# Patient Record
Sex: MALE | Race: WHITE | HISPANIC OR LATINO | Employment: UNEMPLOYED | URBAN - METROPOLITAN AREA
[De-identification: names, ages, dates, MRNs, and addresses within clinical notes are randomized per-mention and may not be internally consistent; named-entity substitution may affect disease eponyms.]

---

## 2017-09-17 ENCOUNTER — HOSPITAL ENCOUNTER (EMERGENCY)
Facility: CLINIC | Age: 32
Discharge: HOME | End: 2017-09-18
Attending: EMERGENCY MEDICINE | Admitting: EMERGENCY MEDICINE
Payer: COMMERCIAL

## 2017-09-17 DIAGNOSIS — F15.10 METHAMPHETAMINE ABUSE (H): ICD-10-CM

## 2017-09-17 DIAGNOSIS — R73.9 HYPERGLYCEMIA: ICD-10-CM

## 2017-09-17 DIAGNOSIS — F32.A DEPRESSION, UNSPECIFIED DEPRESSION TYPE: ICD-10-CM

## 2017-09-17 DIAGNOSIS — F41.9 ANXIETY: ICD-10-CM

## 2017-09-17 LAB — GLUCOSE BLDC GLUCOMTR-MCNC: 369 MG/DL (ref 70–99)

## 2017-09-17 PROCEDURE — 25000131 ZZH RX MED GY IP 250 OP 636 PS 637: Performed by: EMERGENCY MEDICINE

## 2017-09-17 PROCEDURE — 96361 HYDRATE IV INFUSION ADD-ON: CPT

## 2017-09-17 PROCEDURE — 96374 THER/PROPH/DIAG INJ IV PUSH: CPT

## 2017-09-17 PROCEDURE — 80048 BASIC METABOLIC PNL TOTAL CA: CPT | Performed by: EMERGENCY MEDICINE

## 2017-09-17 PROCEDURE — 00000146 ZZHCL STATISTIC GLUCOSE BY METER IP

## 2017-09-17 PROCEDURE — 82010 KETONE BODYS QUAN: CPT | Performed by: EMERGENCY MEDICINE

## 2017-09-17 PROCEDURE — 99285 EMERGENCY DEPT VISIT HI MDM: CPT | Mod: 25

## 2017-09-17 PROCEDURE — 96372 THER/PROPH/DIAG INJ SC/IM: CPT

## 2017-09-17 PROCEDURE — 85025 COMPLETE CBC W/AUTO DIFF WBC: CPT | Performed by: EMERGENCY MEDICINE

## 2017-09-17 PROCEDURE — 25000128 H RX IP 250 OP 636: Performed by: EMERGENCY MEDICINE

## 2017-09-17 PROCEDURE — 90791 PSYCH DIAGNOSTIC EVALUATION: CPT

## 2017-09-17 RX ORDER — DEXTROSE MONOHYDRATE 25 G/50ML
25-50 INJECTION, SOLUTION INTRAVENOUS
Status: DISCONTINUED | OUTPATIENT
Start: 2017-09-17 | End: 2017-09-17

## 2017-09-17 RX ORDER — LORAZEPAM 2 MG/ML
1 INJECTION INTRAMUSCULAR ONCE
Status: COMPLETED | OUTPATIENT
Start: 2017-09-17 | End: 2017-09-17

## 2017-09-17 RX ADMIN — INSULIN HUMAN 15 UNITS: 100 INJECTION, SOLUTION PARENTERAL at 23:57

## 2017-09-17 RX ADMIN — SODIUM CHLORIDE 1000 ML: 9 INJECTION, SOLUTION INTRAVENOUS at 23:55

## 2017-09-17 RX ADMIN — LORAZEPAM 1 MG: 2 INJECTION INTRAMUSCULAR; INTRAVENOUS at 23:56

## 2017-09-17 ASSESSMENT — ENCOUNTER SYMPTOMS: HALLUCINATIONS: 0

## 2017-09-17 NOTE — ED AVS SNAPSHOT
[facility]    [address]    [facility] [address]    Phone:  [phone]    Fax:  [phone]                                       Olman Newby   MRN: [id number]    Department:  [facility]   Date of Visit:  9/17/2017           After Visit Summary Signature Page     I have received my discharge instructions, and my questions have been answered. I have discussed any challenges I see with this plan with the nurse or doctor.    ..........................................................................................................................................  Patient/Patient Representative Signature      ..........................................................................................................................................  Patient Representative Print Name and Relationship to Patient    ..................................................               ................................................  Date                                            Time    ..........................................................................................................................................  Reviewed by Signature/Title    ...................................................              ..............................................  Date                                                            Time

## 2017-09-17 NOTE — ED AVS SNAPSHOT
[facility] Emergency Department    [address] [place] [address]    Phone:  [phone]    Fax:  [phone]                                       Olman Newby   MRN: [id number]    Department:  [facility] Emergency Department   Date of Visit:  9/17/2017           Patient Information     Date Of Birth          1985        Your diagnoses for this visit were:     Depression, unspecified depression type     Anxiety     Methamphetamine abuse     Hyperglycemia        You were seen by Manjinder Maldonado MD.      Follow-up Information     Follow up with Óscar Verdugo MD. Call in 3 days.    Specialty:  INTERNAL MEDICINE - ENDOCRINOLOGY, DIABETES & METABOLISM    Contact information:    [facility] CL[facility]  [address]  [facility] [address]  [phone]          Follow up with mental health and detox. Call in 1 day.        Discharge Instructions       Discharge Instructions  Hyperglycemia, High Blood Sugar    Today we found your blood sugar (glucose) was high. This may mean that you have developed diabetes, or if you already know that you have diabetes, it may mean that your diabetes is not as well controlled as it should be. Signs of elevated blood sugar include increased thirst, frequent urination, blurred vision, fatigue, unexplained weight loss, poor wound healing, and frequent infections.     We sometimes give medicine in the Emergency Department to lower the blood sugar. We may also prescribe medicine for you to use at home, or increase the medicine that you already take. While we don t like to see your blood sugar high, it is much more dangerous to let your blood sugar get too low, so it is reasonable to take time to bring it down, or to wait and watch to see if it comes down on its own.     It is very important that you go to see your regular doctor to have additional tests to see what the high blood sugar test means in your case. See your  doctor as directed today, or within 1 week.    Return to the Emergency Department if you develop:    Nausea and vomiting.    Confusion, disorientation, or being unable to wake up.    Fever greater than 101.5.    Blood sugar greater than 400.    Abdominal pain.    What can I do to help myself?    Check your blood sugar as instructed by your doctor.    Take medications prescribed by your doctor.    Follow a diabetic diet (low fat, low concentrated sweets, high fiber).    Exercise regularly.    Moderate or eliminate alcohol use.    Stop smoking.    Diabetes mellitus is a disease in which the body cannot regulate the amount of sugar (glucose) in the blood. Insulin allows glucose to move out of the blood into cells throughout the body where it is used for fuel. People with diabetes do not produce enough insulin (type 1 diabetes), or cannot use insulin properly (type 2 diabetes), or both. This starves the cells that need the glucose for fuel, and also harms certain organs and tissues exposed to the high glucose levels.  Over a long period of time, uncontrolled diabetes can lead to heart and blood vessel disease, blindness, kidney failure, foot ulcers and many other problems.    About 17 million Americans (6.2% of adults) have diabetes. We think that about one third of adults with diabetes do not know they have diabetes.  The incidence of diabetes is increasing rapidly. This increase is due to many factors, but the most significant are our increasing weight and decreased activity levels.     Diabetes can be a very serious and life-threatening illness if not treated.  If you were given a prescription for medicine here today, be sure to read all of the information (including the package insert) that comes with your prescription.  This will include important information about the medicine, its side effects, and any warnings that you need to know about.  The pharmacist who fills the prescription can provide more information  and answer questions you may have about the medicine.  If you have questions or concerns that the pharmacist cannot address, please call or return to the Emergency Department.   Opioid Medication Information    Pain medications are among the most commonly prescribed medicines, so we are including this information for all our patients. If you did not receive pain medication or get a prescription for pain medicine, you can ignore it.     You may have been given a prescription for an opioid (narcotic) pain medicine and/or have received a pain medicine while here in the Emergency Department. These medicines can make you drowsy or impaired. You must not drive, operate dangerous equipment, or engage in any other dangerous activities while taking these medications. If you drive while taking these medications, you could be arrested for DUI, or driving under the influence. Do not drink any alcohol while you are taking these medications.     Opioid pain medications can cause addiction. If you have a history of chemical dependency of any type, you are at a higher risk of becoming addicted to pain medications.  Only take these prescribed medications to treat your pain when all other options have been tried. Take it for as short a time and as few doses as possible. Store your pain pills in a secure place, as they are frequently stolen and provide a dangerous opportunity for children or visitors in your house to start abusing these powerful medications. We will not replace any lost or stolen medicine.  As soon as your pain is better, you should flush all your remaining medication.     Many prescription pain medications contain Tylenol  (acetaminophen), including Vicodin , Tylenol #3 , Norco , Lortab , and Percocet .  You should not take any extra pills of Tylenol  if you are using these prescription medications or you can get very sick.  Do not ever take more than 3000 mg of acetaminophen in any 24 hour period.    All opioids  tend to cause constipation. Drink plenty of water and eat foods that have a lot of fiber, such as fruits, vegetables, prune juice, apple juice and high fiber cereal.  Take a laxative if you don t move your bowels at least every other day. Miralax , Milk of Magnesia, Colace , or Senna  can be used to keep you regular.      Remember that you can always come back to the Emergency Department if you are not able to see your regular doctor in the amount of time listed above, if you get any new symptoms, or if there is anything that worries you.        Discharge Instructions  Mental Health Concerns    You were seen today for mental health concerns, such as depression, severe anxiety, or suicidal thinking. Your doctor feels that you do not require hospitalization at this time. However, your symptoms may become worse, and you may need to return to the Emergency Department. Most treatments of depression and suicidal thoughts are a process rather than a single intervention.  Medications and counseling can take several weeks or more to help.  It is important to follow up as discussed with your family doctor or counselor.      By accepting these discharge instructions:    You promise to not harm yourself or others.    You agree that if you feel you are becoming unable to keep that promise, you will do something to help yourself before you do anything to harm yourself or others.     You agree to keep any safety plan arranged on your visit here today.    You agree to take any medication prescribed or recommended by your doctor.    If you are getting worse, you can contact a friend or a family member, contact your counselor or family doctor, contact a crisis line, or other options discussed with the doctor or therapist today.    At any time, you can call 911 and return to the emergency department for more help.    You understand that follow-up is essential to your treatment, and you will make and keep appointments recommended on  your visit today.    How to improve your mental health and prevent suicide:    Involve others by letting family, friends, counselors know.  Do not isolate yourself.    Avoid alcohol or drugs. Remove weapons, poisons from your home.    Try to stick to routines for eating, sleeping and getting regular exercise.      Try to get into sunlight. Bright natural light not only treats seasonal affective disorder but also depression.    Increase safe activities that you enjoy.    If you feel worse, contact [phone], or call 911, or your family doctor/counselor for additional assistance.    If you were given a prescription for medicine here today, be sure to read all of the information (including the package insert) that comes with your prescription.  This will include important information about the medicine, its side effects, and any warnings that you need to know about.  The pharmacist who fills the prescription can provide more information and answer questions you may have about the medicine.  If you have questions or concerns that the pharmacist cannot address, please call or return to the Emergency Department.     Opioid Medication Information    Pain medications are among the most commonly prescribed medicines, so we are including this information for all our patients. If you did not receive pain medication or get a prescription for pain medicine, you can ignore it.     You may have been given a prescription for an opioid (narcotic) pain medicine and/or have received a pain medicine while here in the Emergency Department. These medicines can make you drowsy or impaired. You must not drive, operate dangerous equipment, or engage in any other dangerous activities while taking these medications. If you drive while taking these medications, you could be arrested for DUI, or driving under the influence. Do not drink any alcohol while you are taking these medications.     Opioid pain medications can cause addiction. If  you have a history of chemical dependency of any type, you are at a higher risk of becoming addicted to pain medications.  Only take these prescribed medications to treat your pain when all other options have been tried. Take it for as short a time and as few doses as possible. Store your pain pills in a secure place, as they are frequently stolen and provide a dangerous opportunity for children or visitors in your house to start abusing these powerful medications. We will not replace any lost or stolen medicine.  As soon as your pain is better, you should flush all your remaining medication.     Many prescription pain medications contain Tylenol  (acetaminophen), including Vicodin , Tylenol #3 , Norco , Lortab , and Percocet .  You should not take any extra pills of Tylenol  if you are using these prescription medications or you can get very sick.  Do not ever take more than 3000 mg of acetaminophen in any 24 hour period.    All opioids tend to cause constipation. Drink plenty of water and eat foods that have a lot of fiber, such as fruits, vegetables, prune juice, apple juice and high fiber cereal.  Take a laxative if you don t move your bowels at least every other day. Miralax , Milk of Magnesia, Colace , or Senna  can be used to keep you regular.      Remember that you can always come back to the Emergency Department if you are not able to see your regular doctor in the amount of time listed above, if you get any new symptoms, or if there is anything that worries you.        Drug Abuse  Use and abuse of drugs like marijuana, amphetamines (speed, crank), cocaine, heroin or prescription pain medicines, sedatives and sleeping pills, MDMA, ecstasy, bath salts, PCP, mescaline and LSD may lead to addiction or dependence. Once this happens, you are at greater risk for any of the following:  Social and personal problems    Craving for the drug and not able to stop using even though you think you want to stop  (psychological addiction)    Drug withdrawal symptoms if you stop taking the drug (physical dependence)    Loss of job or your family    Arrest, conviction, and prison sentence for possession of an illegal substance or for driving under the influence  Health problems    Strokes, heart attacks, kidney failure    Accidental injuries to yourself or others while you are under the influence of the drug (in a car or at home)    HIV infection (much greater risk if you use IV drugs)    Skin infections    Other sexually transmitted disease (STDs such as herpes, chlamydia, gonorrhea, and others)    Severe and fatal infection of the heart valves (if you use IV drugs)    Hepatitis B or C    Death from overdose  Home care  The following suggestions can help you care for yourself at home:    Admit you have a drug problem. Ask for help from your family and close friends.    Seek professional help. This could be in the form of individual psychotherapy or counseling. There are also outpatient, inpatient, and residential drug treatment programs.    Join a self-help group for drug abuse.    Stay away from friends who abuse drugs or tempt you to continue abusing drugs.    Eat a balanced diet and start a regular exercise program.  Follow-up care  Follow up with your healthcare provider, or as advised. Contact one of the resources below for help:    National [place] on Alcoholism and Drug Dependence  www.ncadd.org  561.409.2331    Narcotics Anonymous  www.na.org  634.587.1777    National Alcohol and Substance Abuse Information Center (for referral to treatment programs)  www.addictioncareNew Screens.The [employer] Times  254.375.3044  Call 911  Call 911 if any of the following occur:    Seizure    Hard time breathing or slow, irregular breathing    Chest pain    Sudden weakness on one side of your body or sudden trouble speaking    Very drowsy or trouble awakening    Fainting or loss of consciousness    Rapid heart rate    Very slow heart rate  When to seek  medical advice  Call your healthcare provider right away if any of these occur:    Agitation, anxiety, unable to sleep    Unintended weight loss (more than 10 to 15 pounds over 3 months)    Fever of 100.4 F (38 C) or higher, or as directed by your healthcare provider    Shortness of breath    Cough with colored sputum    Redness, swelling, or tenderness at an injection site  Date Last Reviewed: 6/1/2016 2000-2017 [employer]. [address] [place] [address], [place] [address]. All rights reserved. This information is not intended as a substitute for professional medical care. Always follow your healthcare professional's instructions.          24 Hour Appointment Hotline       To make an appointment at any [facility], call [phone] ([phone]). If you don't have a family doctor or clinic, we will help you find one. [facility] are conveniently located to serve the needs of you and your family.             Review of your medicines      Our records show that you are taking the medicines listed below. If these are incorrect, please call your family doctor or clinic.        Dose / Directions Last dose taken    aspirin 81 MG tablet   Dose:  1 tablet        Take 1 tablet by mouth daily.   Refills:  0        LANTUS SC        Inject  Subcutaneous daily.   Refills:  0        NOVOLOG SC        Inject  Subcutaneous daily.   Refills:  0                Procedures and tests performed during your visit     Procedure/Test Number of Times Performed    Basic metabolic panel 1    CBC with platelets differential 1    Glucose by meter 2    Ketone Beta-Hydroxybutyrate Quantitative 1      Orders Needing Specimen Collection     None      Pending Results     No orders found for last 3 day(s).            Pending Culture Results     No orders found for last 3 day(s).            Pending Results Instructions     If you had any lab results that were not finalized at the time of your Discharge, you can call the ED Lab  Result RN at [phone]. You will be contacted by this team for any positive Lab results or changes in treatment. The nurses are available 7 days a week from 10A to 6:30P.  You can leave a message 24 hours per day and they will return your call.        Test Results From Your Hospital Stay        9/17/2017 11:25 PM      Component Results     Component Value Ref Range & Units Status    Glucose 369 (H) 70 - 99 mg/dL Final         9/18/2017 12:00 AM      Component Results     Component Value Ref Range & Units Status    WBC 9.6 4.0 - 11.0 10e9/L Final    RBC Count 5.29 4.4 - 5.9 10e12/L Final    Hemoglobin 16.1 13.3 - 17.7 g/dL Final    Hematocrit 47.0 40.0 - 53.0 % Final    MCV 89 78 - 100 fl Final    MCH 30.4 26.5 - 33.0 pg Final    MCHC 34.3 31.5 - 36.5 g/dL Final    RDW 12.3 10.0 - 15.0 % Final    Platelet Count 301 150 - 450 10e9/L Final    Diff Method Automated Method  Final    % Neutrophils 79.5 % Final    % Lymphocytes 13.1 % Final    % Monocytes 6.1 % Final    % Eosinophils 0.4 % Final    % Basophils 0.5 % Final    % Immature Granulocytes 0.4 % Final    Nucleated RBCs 0 0 /100 Final    Absolute Neutrophil 7.6 1.6 - 8.3 10e9/L Final    Absolute Lymphocytes 1.3 0.8 - 5.3 10e9/L Final    Absolute Monocytes 0.6 0.0 - 1.3 10e9/L Final    Absolute Eosinophils 0.0 0.0 - 0.7 10e9/L Final    Absolute Basophils 0.1 0.0 - 0.2 10e9/L Final    Abs Immature Granulocytes 0.0 0 - 0.4 10e9/L Final    Absolute Nucleated RBC 0.0  Final         9/18/2017 12:14 AM      Component Results     Component Value Ref Range & Units Status    Sodium 130 (L) 133 - 144 mmol/L Final    Potassium 4.8 3.4 - 5.3 mmol/L Final    Chloride 98 94 - 109 mmol/L Final    Carbon Dioxide 26 20 - 32 mmol/L Final    Anion Gap 6 3 - 14 mmol/L Final    Glucose 389 (H) 70 - 99 mg/dL Final    Urea Nitrogen 12 7 - 30 mg/dL Final    Creatinine 0.77 0.66 - 1.25 mg/dL Final    GFR Estimate >90 >60 mL/min/1.7m2 Final    Non  GFR Calc    GFR Estimate  If Black >90 >60 mL/min/1.7m2 Final    African American GFR Calc    Calcium 9.0 8.5 - 10.1 mg/dL Final         9/18/2017 12:07 AM      Component Results     Component Value Ref Range & Units Status    Ketone Quantitative 1.3 (H) 0.0 - 0.6 mmol/L Final         9/18/2017  1:25 AM      Component Results     Component Value Ref Range & Units Status    Glucose 286 (H) 70 - 99 mg/dL Final                Clinical Quality Measure: Blood Pressure Screening     Your blood pressure was checked while you were in the emergency department today. The last reading we obtained was  BP: (!) 149/96 . Please read the guidelines below about what these numbers mean and what you should do about them.  If your systolic blood pressure (the top number) is less than 120 and your diastolic blood pressure (the bottom number) is less than 80, then your blood pressure is normal. There is nothing more that you need to do about it.  If your systolic blood pressure (the top number) is 120-139 or your diastolic blood pressure (the bottom number) is 80-89, your blood pressure may be higher than it should be. You should have your blood pressure rechecked within a year by a primary care provider.  If your systolic blood pressure (the top number) is 140 or greater or your diastolic blood pressure (the bottom number) is 90 or greater, you may have high blood pressure. High blood pressure is treatable, but if left untreated over time it can put you at risk for heart attack, stroke, or kidney failure. You should have your blood pressure rechecked by a primary care provider within the next 4 weeks.  If your provider in the emergency department today gave you specific instructions to follow-up with your doctor or provider even sooner than that, you should follow that instruction and not wait for up to 4 weeks for your follow-up visit.        Thank you for choosing [facility]       Thank you for choosing [facility] for your care. Our goal is always to provide you  "with excellent care. Hearing back from our patients is one way we can continue to improve our services. Please take a few minutes to complete the written survey that you may receive in the mail after you visit with us. Thank you!        [facility] Information     [facility] lets you send messages to your doctor, view your test results, renew your prescriptions, schedule appointments and more. To sign up, go to [email or web address] . Click on \"Log in\" on the left side of the screen, which will take you to the Welcome page. Then click on \"Sign up Now\" on the right side of the page.     You will be asked to enter the access code listed below, as well as some personal information. Please follow the directions to create your username and password.     Your access code is: [email or web address]  Expires: 2017  1:31 AM     Your access code will  in 90 days. If you need help or a new code, please call your [facility] clinic or [phone].        Care EveryWhere ID     This is your Care EveryWhere ID. This could be used by other organizations to access your [facility] medical records  [id number]        Equal Access to Services     MARLY SCANLON AH: Hadii debi Macdonald, wapattie richardson, qaybta kaalmaiveth gold, boogie horton. So [facility] [phone].    ATENCIÓN: Si habla español, tiene a vora disposición servicios gratuitos de asistencia lingüística. Mehrdadame al [phone].    We comply with applicable federal civil rights laws and [place] laws. We do not discriminate on the basis of race, color, national origin, age, disability sex, sexual orientation or gender identity.            After Visit Summary       This is your record. Keep this with you and show to your community pharmacist(s) and doctor(s) at your next visit.                  "

## 2017-09-18 VITALS
OXYGEN SATURATION: 98 % | DIASTOLIC BLOOD PRESSURE: 96 MMHG | RESPIRATION RATE: 16 BRPM | SYSTOLIC BLOOD PRESSURE: 149 MMHG | TEMPERATURE: 97.8 F

## 2017-09-18 LAB
ANION GAP SERPL CALCULATED.3IONS-SCNC: 6 MMOL/L (ref 3–14)
BASOPHILS # BLD AUTO: 0.1 10E9/L (ref 0–0.2)
BASOPHILS NFR BLD AUTO: 0.5 %
BUN SERPL-MCNC: 12 MG/DL (ref 7–30)
CALCIUM SERPL-MCNC: 9 MG/DL (ref 8.5–10.1)
CHLORIDE SERPL-SCNC: 98 MMOL/L (ref 94–109)
CO2 SERPL-SCNC: 26 MMOL/L (ref 20–32)
CREAT SERPL-MCNC: 0.77 MG/DL (ref 0.66–1.25)
DIFFERENTIAL METHOD BLD: NORMAL
EOSINOPHIL # BLD AUTO: 0 10E9/L (ref 0–0.7)
EOSINOPHIL NFR BLD AUTO: 0.4 %
ERYTHROCYTE [DISTWIDTH] IN BLOOD BY AUTOMATED COUNT: 12.3 % (ref 10–15)
GFR SERPL CREATININE-BSD FRML MDRD: >90 ML/MIN/1.7M2
GLUCOSE BLDC GLUCOMTR-MCNC: 286 MG/DL (ref 70–99)
GLUCOSE SERPL-MCNC: 389 MG/DL (ref 70–99)
HCT VFR BLD AUTO: 47 % (ref 40–53)
HGB BLD-MCNC: 16.1 G/DL (ref 13.3–17.7)
IMM GRANULOCYTES # BLD: 0 10E9/L (ref 0–0.4)
IMM GRANULOCYTES NFR BLD: 0.4 %
KETONES BLD-SCNC: 1.3 MMOL/L (ref 0–0.6)
LYMPHOCYTES # BLD AUTO: 1.3 10E9/L (ref 0.8–5.3)
LYMPHOCYTES NFR BLD AUTO: 13.1 %
MCH RBC QN AUTO: 30.4 PG (ref 26.5–33)
MCHC RBC AUTO-ENTMCNC: 34.3 G/DL (ref 31.5–36.5)
MCV RBC AUTO: 89 FL (ref 78–100)
MONOCYTES # BLD AUTO: 0.6 10E9/L (ref 0–1.3)
MONOCYTES NFR BLD AUTO: 6.1 %
NEUTROPHILS # BLD AUTO: 7.6 10E9/L (ref 1.6–8.3)
NEUTROPHILS NFR BLD AUTO: 79.5 %
NRBC # BLD AUTO: 0 10*3/UL
NRBC BLD AUTO-RTO: 0 /100
PLATELET # BLD AUTO: 301 10E9/L (ref 150–450)
POTASSIUM SERPL-SCNC: 4.8 MMOL/L (ref 3.4–5.3)
RBC # BLD AUTO: 5.29 10E12/L (ref 4.4–5.9)
SODIUM SERPL-SCNC: 130 MMOL/L (ref 133–144)
WBC # BLD AUTO: 9.6 10E9/L (ref 4–11)

## 2017-09-18 PROCEDURE — 96361 HYDRATE IV INFUSION ADD-ON: CPT

## 2017-09-18 PROCEDURE — 00000146 ZZHCL STATISTIC GLUCOSE BY METER IP

## 2017-09-18 NOTE — ED NOTES
Encouraged patient to follow up with appointments as made, encouraged patient to discontinue drug use and seek help, patient discharged to friend

## 2017-09-18 NOTE — ED NOTES
Repeat blood sugar done, patient called for a ride and they will be here in 15 minutes, MD informed of all

## 2017-09-18 NOTE — ED PROVIDER NOTES
History     Chief Complaint:  Mental health evaluation    HPI   Olman Newby is a 31 year old male who presents for a psychiatric evaluation. The patient reports he is dealing with depression, anxiety and increased stress. He is concerned by his addiction to methamphetamine and alcohol, and wants to get his life together. He states he ate meth today. He does not want to harm himself. Denies hallucinations.     Of note, the patient has diabetes, and his blood sugar was 369 earlier today. He states he forgot to take his insulin today. Patient has been in DKA before.    Allergies:  Sulfa drugs     Medications:    Novolog  Lantus  Aspirin     Past Medical History:    DM    Past Surgical History:    History reviewed.  No significant past surgical history.    Family History:    History reviewed.  No significant family history.    Social History:  Relationship status: Single  Tobacco use: Positive  Alcohol use: Positive  The patient presents alone.     Review of Systems   Psychiatric/Behavioral: Negative for hallucinations and self-injury.   All other systems reviewed and are negative.      Physical Exam   First Vitals:  BP: (!) 155/97  Heart Rate: 110  Temp: 97.8  F (36.6  C)  Resp: 16  SpO2: 98 %    Physical Exam  Constitutional:  Oriented to person, place, and time.  HENT:   Head:    Normocephalic.   Mouth/Throat:   Oropharynx is clear and moist.   Eyes:    EOM are normal. Pupils are equal, round, and reactive to light.   Neck:    Neck supple.   Cardiovascular:  Normal rate, regular rhythm and normal heart sounds.      Exam reveals no gallop and no friction rub.       No murmur heard.  Pulmonary/Chest:  Effort normal and breath sounds normal.      No respiratory distress. No wheezes. No rales.      No reproducible chest wall pain.  Abdominal:   Soft. No distension. No tenderness. No rebound and no guarding.   Musculoskeletal:  Normal range of motion.   Neurological:   Alert and oriented to person, place, and  time.           Moves all 4 extremities spontaneously    Skin:    No rash noted. No pallor.   Psych:   Anxious, depressed, no suicidal ideation.       Emergency Department Course     Laboratory:  CBC: WNL (WBC 9.6, HGB 16.1, )  BMP: Na 130 (L), Glucose 389 (H), o/w WNL (Creatinine 0.77)  2319: Glucose by Meter: 369 (H)   0121: Glucose by Meter: 286 (H)   Ketone beta-hydroxybutyrate quantitative: 1.3 (H)    Interventions:  2355: Normal Saline, 1000 mL, IV  2356: Ativan, 1.0 mg, IV injection  2357: Insulin, 15 units, Subcutaneous    Emergency Department Course:  Nursing notes and vitals reviewed.  I performed an exam of the patient as documented above.  The above workup was undertaken.   I rechecked the patient and discussed results.    Findings and plan explained to the Patient. Patient discharged home, status improved, with instructions regarding supportive care, medications, and reasons to return as well as the importance of close follow-up was reviewed.      Impression & Plan      Medical Decision Making:  Olman Newby is a 31 year old male who wanted help for mental health, as well as drug abuse. Denied suicidal ideation. He had some methamphetamine use earlier in the day, as well as forgetting to take his insulin. His glucose is high, and despite having mildly elevated ketones, he has no anion gap. This is not consisted with DKA. He has been given a corrected dose of his insulin, and glucose has come down nicely. DEC did further consult, and recommended outpatient follow up, as he is non suicidal and clinically sober. I believe he is appropriate for outpatient management. He is told to watch his sugar carefully. Follow up with mental health and detox resources. Return for any suicidal ideation, hyperglycemia. Discharged home to follow up with PMD and mental health resources..     Diagnosis:    ICD-10-CM    1. Depression, unspecified depression type F32.9 Glucose by meter     Glucose by meter   2.  Anxiety F41.9    3. Methamphetamine abuse F15.10    4. Hyperglycemia R73.9      Disposition:  Discharge to home with primary care follow up.    I, Primitivo Collins, am serving as a scribe on 9/17/2017 at 11:20 PM to personally document services performed by Manjinder Maldonado MD, based on my observations and the provider's statements to me.    [facility] EMERGENCY DEPARTMENT       Manjinder Maldonado MD  09/18/17 0212

## 2017-09-18 NOTE — ED NOTES
Patient reports struggling with depression and anxiety for around 5 years, became much worse yesterday and didn't sleep last night, works in morning and concerned about sleeping tonight, admits to having 3 beers today, some marijuana and some narcotics as well today, restless in room, cooperative at this time,

## 2017-09-18 NOTE — DISCHARGE INSTRUCTIONS
Discharge Instructions  Hyperglycemia, High Blood Sugar    Today we found your blood sugar (glucose) was high. This may mean that you have developed diabetes, or if you already know that you have diabetes, it may mean that your diabetes is not as well controlled as it should be. Signs of elevated blood sugar include increased thirst, frequent urination, blurred vision, fatigue, unexplained weight loss, poor wound healing, and frequent infections.     We sometimes give medicine in the Emergency Department to lower the blood sugar. We may also prescribe medicine for you to use at home, or increase the medicine that you already take. While we don t like to see your blood sugar high, it is much more dangerous to let your blood sugar get too low, so it is reasonable to take time to bring it down, or to wait and watch to see if it comes down on its own.     It is very important that you go to see your regular doctor to have additional tests to see what the high blood sugar test means in your case. See your doctor as directed today, or within 1 week.    Return to the Emergency Department if you develop:    Nausea and vomiting.    Confusion, disorientation, or being unable to wake up.    Fever greater than 101.5.    Blood sugar greater than 400.    Abdominal pain.    What can I do to help myself?    Check your blood sugar as instructed by your doctor.    Take medications prescribed by your doctor.    Follow a diabetic diet (low fat, low concentrated sweets, high fiber).    Exercise regularly.    Moderate or eliminate alcohol use.    Stop smoking.    Diabetes mellitus is a disease in which the body cannot regulate the amount of sugar (glucose) in the blood. Insulin allows glucose to move out of the blood into cells throughout the body where it is used for fuel. People with diabetes do not produce enough insulin (type 1 diabetes), or cannot use insulin properly (type 2 diabetes), or both. This starves the cells that need the  glucose for fuel, and also harms certain organs and tissues exposed to the high glucose levels.  Over a long period of time, uncontrolled diabetes can lead to heart and blood vessel disease, blindness, kidney failure, foot ulcers and many other problems.    About 17 million Americans (6.2% of adults) have diabetes. We think that about one third of adults with diabetes do not know they have diabetes.  The incidence of diabetes is increasing rapidly. This increase is due to many factors, but the most significant are our increasing weight and decreased activity levels.     Diabetes can be a very serious and life-threatening illness if not treated.  If you were given a prescription for medicine here today, be sure to read all of the information (including the package insert) that comes with your prescription.  This will include important information about the medicine, its side effects, and any warnings that you need to know about.  The pharmacist who fills the prescription can provide more information and answer questions you may have about the medicine.  If you have questions or concerns that the pharmacist cannot address, please call or return to the Emergency Department.   Opioid Medication Information    Pain medications are among the most commonly prescribed medicines, so we are including this information for all our patients. If you did not receive pain medication or get a prescription for pain medicine, you can ignore it.     You may have been given a prescription for an opioid (narcotic) pain medicine and/or have received a pain medicine while here in the Emergency Department. These medicines can make you drowsy or impaired. You must not drive, operate dangerous equipment, or engage in any other dangerous activities while taking these medications. If you drive while taking these medications, you could be arrested for DUI, or driving under the influence. Do not drink any alcohol while you are taking these  medications.     Opioid pain medications can cause addiction. If you have a history of chemical dependency of any type, you are at a higher risk of becoming addicted to pain medications.  Only take these prescribed medications to treat your pain when all other options have been tried. Take it for as short a time and as few doses as possible. Store your pain pills in a secure place, as they are frequently stolen and provide a dangerous opportunity for children or visitors in your house to start abusing these powerful medications. We will not replace any lost or stolen medicine.  As soon as your pain is better, you should flush all your remaining medication.     Many prescription pain medications contain Tylenol  (acetaminophen), including Vicodin , Tylenol #3 , Norco , Lortab , and Percocet .  You should not take any extra pills of Tylenol  if you are using these prescription medications or you can get very sick.  Do not ever take more than 3000 mg of acetaminophen in any 24 hour period.    All opioids tend to cause constipation. Drink plenty of water and eat foods that have a lot of fiber, such as fruits, vegetables, prune juice, apple juice and high fiber cereal.  Take a laxative if you don t move your bowels at least every other day. Miralax , Milk of Magnesia, Colace , or Senna  can be used to keep you regular.      Remember that you can always come back to the Emergency Department if you are not able to see your regular doctor in the amount of time listed above, if you get any new symptoms, or if there is anything that worries you.        Discharge Instructions  Mental Health Concerns    You were seen today for mental health concerns, such as depression, severe anxiety, or suicidal thinking. Your doctor feels that you do not require hospitalization at this time. However, your symptoms may become worse, and you may need to return to the Emergency Department. Most treatments of depression and suicidal thoughts are  a process rather than a single intervention.  Medications and counseling can take several weeks or more to help.  It is important to follow up as discussed with your family doctor or counselor.      By accepting these discharge instructions:    You promise to not harm yourself or others.    You agree that if you feel you are becoming unable to keep that promise, you will do something to help yourself before you do anything to harm yourself or others.     You agree to keep any safety plan arranged on your visit here today.    You agree to take any medication prescribed or recommended by your doctor.    If you are getting worse, you can contact a friend or a family member, contact your counselor or family doctor, contact a crisis line, or other options discussed with the doctor or therapist today.    At any time, you can call 911 and return to the emergency department for more help.    You understand that follow-up is essential to your treatment, and you will make and keep appointments recommended on your visit today.    How to improve your mental health and prevent suicide:    Involve others by letting family, friends, counselors know.  Do not isolate yourself.    Avoid alcohol or drugs. Remove weapons, poisons from your home.    Try to stick to routines for eating, sleeping and getting regular exercise.      Try to get into sunlight. Bright natural light not only treats seasonal affective disorder but also depression.    Increase safe activities that you enjoy.    If you feel worse, contact [phone], or call 911, or your family doctor/counselor for additional assistance.    If you were given a prescription for medicine here today, be sure to read all of the information (including the package insert) that comes with your prescription.  This will include important information about the medicine, its side effects, and any warnings that you need to know about.  The pharmacist who fills the prescription can provide  more information and answer questions you may have about the medicine.  If you have questions or concerns that the pharmacist cannot address, please call or return to the Emergency Department.     Opioid Medication Information    Pain medications are among the most commonly prescribed medicines, so we are including this information for all our patients. If you did not receive pain medication or get a prescription for pain medicine, you can ignore it.     You may have been given a prescription for an opioid (narcotic) pain medicine and/or have received a pain medicine while here in the Emergency Department. These medicines can make you drowsy or impaired. You must not drive, operate dangerous equipment, or engage in any other dangerous activities while taking these medications. If you drive while taking these medications, you could be arrested for DUI, or driving under the influence. Do not drink any alcohol while you are taking these medications.     Opioid pain medications can cause addiction. If you have a history of chemical dependency of any type, you are at a higher risk of becoming addicted to pain medications.  Only take these prescribed medications to treat your pain when all other options have been tried. Take it for as short a time and as few doses as possible. Store your pain pills in a secure place, as they are frequently stolen and provide a dangerous opportunity for children or visitors in your house to start abusing these powerful medications. We will not replace any lost or stolen medicine.  As soon as your pain is better, you should flush all your remaining medication.     Many prescription pain medications contain Tylenol  (acetaminophen), including Vicodin , Tylenol #3 , Norco , Lortab , and Percocet .  You should not take any extra pills of Tylenol  if you are using these prescription medications or you can get very sick.  Do not ever take more than 3000 mg of acetaminophen in any 24 hour  period.    All opioids tend to cause constipation. Drink plenty of water and eat foods that have a lot of fiber, such as fruits, vegetables, prune juice, apple juice and high fiber cereal.  Take a laxative if you don t move your bowels at least every other day. Miralax , Milk of Magnesia, Colace , or Senna  can be used to keep you regular.      Remember that you can always come back to the Emergency Department if you are not able to see your regular doctor in the amount of time listed above, if you get any new symptoms, or if there is anything that worries you.        Drug Abuse  Use and abuse of drugs like marijuana, amphetamines (speed, crank), cocaine, heroin or prescription pain medicines, sedatives and sleeping pills, MDMA, ecstasy, bath salts, PCP, mescaline and LSD may lead to addiction or dependence. Once this happens, you are at greater risk for any of the following:  Social and personal problems    Craving for the drug and not able to stop using even though you think you want to stop (psychological addiction)    Drug withdrawal symptoms if you stop taking the drug (physical dependence)    Loss of job or your family    Arrest, conviction, and [facility] sentence for possession of an illegal substance or for driving under the influence  Health problems    Strokes, heart attacks, kidney failure    Accidental injuries to yourself or others while you are under the influence of the drug (in a car or at home)    HIV infection (much greater risk if you use IV drugs)    Skin infections    Other sexually transmitted disease (STDs such as herpes, chlamydia, gonorrhea, and others)    Severe and fatal infection of the heart valves (if you use IV drugs)    Hepatitis B or C    Death from overdose  Home care  The following suggestions can help you care for yourself at home:    Admit you have a drug problem. Ask for help from your family and close friends.    Seek professional help. This could be in the form of individual  psychotherapy or counseling. There are also outpatient, inpatient, and residential drug treatment programs.    Join a self-help group for drug abuse.    Stay away from friends who abuse drugs or tempt you to continue abusing drugs.    Eat a balanced diet and start a regular exercise program.  Follow-up care  Follow up with your healthcare provider, or as advised. Contact one of the resources below for help:    National Bern on Alcoholism and Drug Dependence  www.ncadd.org  127.461.4952    Narcotics Anonymous  www.na.org  516.325.8529    National Alcohol and Substance Abuse Information Center (for referral to treatment programs)  www[facility]  595.700.5695  Call 911  Call 911 if any of the following occur:    Seizure    Hard time breathing or slow, irregular breathing    Chest pain    Sudden weakness on one side of your body or sudden trouble speaking    Very drowsy or trouble awakening    Fainting or loss of consciousness    Rapid heart rate    Very slow heart rate  When to seek medical advice  Call your healthcare provider right away if any of these occur:    Agitation, anxiety, unable to sleep    Unintended weight loss (more than 10 to 15 pounds over 3 months)    Fever of 100.4 F (38 C) or higher, or as directed by your healthcare provider    Shortness of breath    Cough with colored sputum    Redness, swelling, or tenderness at an injection site  Date Last Reviewed: 6/1/2016 2000-2017 [employer]. [address] [place] [address], [place] [address]. All rights reserved. This information is not intended as a substitute for professional medical care. Always follow your healthcare professional's instructions.

## 2017-10-08 ENCOUNTER — HOSPITAL ENCOUNTER (EMERGENCY)
Facility: CLINIC | Age: 32
Discharge: HOME | End: 2017-10-09
Attending: EMERGENCY MEDICINE | Admitting: EMERGENCY MEDICINE
Payer: COMMERCIAL

## 2017-10-08 DIAGNOSIS — F10.220 ACUTE ALCOHOLIC INTOXICATION IN ALCOHOLISM WITHOUT COMPLICATION (H): ICD-10-CM

## 2017-10-08 LAB
AMPHETAMINES UR QL SCN: NEGATIVE
ANION GAP SERPL CALCULATED.3IONS-SCNC: 8 MMOL/L (ref 3–14)
APAP SERPL-MCNC: <2 MG/L (ref 10–20)
BARBITURATES UR QL: NEGATIVE
BASOPHILS # BLD AUTO: 0 10E9/L (ref 0–0.2)
BASOPHILS NFR BLD AUTO: 0.6 %
BENZODIAZ UR QL: NEGATIVE
BUN SERPL-MCNC: 9 MG/DL (ref 7–30)
CALCIUM SERPL-MCNC: 8.1 MG/DL (ref 8.5–10.1)
CANNABINOIDS UR QL SCN: NEGATIVE
CHLORIDE SERPL-SCNC: 111 MMOL/L (ref 94–109)
CO2 SERPL-SCNC: 24 MMOL/L (ref 20–32)
COCAINE UR QL: NEGATIVE
CREAT SERPL-MCNC: 0.74 MG/DL (ref 0.66–1.25)
DIFFERENTIAL METHOD BLD: NORMAL
EOSINOPHIL # BLD AUTO: 0.1 10E9/L (ref 0–0.7)
EOSINOPHIL NFR BLD AUTO: 1.8 %
ERYTHROCYTE [DISTWIDTH] IN BLOOD BY AUTOMATED COUNT: 12.4 % (ref 10–15)
ETHANOL SERPL-MCNC: 0.3 G/DL
GFR SERPL CREATININE-BSD FRML MDRD: >90 ML/MIN/1.7M2
GLUCOSE SERPL-MCNC: 120 MG/DL (ref 70–99)
HCT VFR BLD AUTO: 45.6 % (ref 40–53)
HGB BLD-MCNC: 16.5 G/DL (ref 13.3–17.7)
IMM GRANULOCYTES # BLD: 0 10E9/L (ref 0–0.4)
IMM GRANULOCYTES NFR BLD: 0.3 %
INTERPRETATION ECG - MUSE: NORMAL
LYMPHOCYTES # BLD AUTO: 2.7 10E9/L (ref 0.8–5.3)
LYMPHOCYTES NFR BLD AUTO: 36.9 %
MCH RBC QN AUTO: 31.6 PG (ref 26.5–33)
MCHC RBC AUTO-ENTMCNC: 36.2 G/DL (ref 31.5–36.5)
MCV RBC AUTO: 87 FL (ref 78–100)
MONOCYTES # BLD AUTO: 0.3 10E9/L (ref 0–1.3)
MONOCYTES NFR BLD AUTO: 4.4 %
NEUTROPHILS # BLD AUTO: 4 10E9/L (ref 1.6–8.3)
NEUTROPHILS NFR BLD AUTO: 56 %
NRBC # BLD AUTO: 0 10*3/UL
NRBC BLD AUTO-RTO: 0 /100
OPIATES UR QL SCN: NEGATIVE
PCP UR QL SCN: NEGATIVE
PLATELET # BLD AUTO: 292 10E9/L (ref 150–450)
POTASSIUM SERPL-SCNC: 4 MMOL/L (ref 3.4–5.3)
RBC # BLD AUTO: 5.22 10E12/L (ref 4.4–5.9)
SALICYLATES SERPL-MCNC: <2 MG/DL
SODIUM SERPL-SCNC: 143 MMOL/L (ref 133–144)
WBC # BLD AUTO: 7.2 10E9/L (ref 4–11)

## 2017-10-08 PROCEDURE — 99285 EMERGENCY DEPT VISIT HI MDM: CPT | Mod: 25

## 2017-10-08 PROCEDURE — 80329 ANALGESICS NON-OPIOID 1 OR 2: CPT | Performed by: EMERGENCY MEDICINE

## 2017-10-08 PROCEDURE — S0166 INJ OLANZAPINE 2.5MG: HCPCS | Performed by: EMERGENCY MEDICINE

## 2017-10-08 PROCEDURE — 93005 ELECTROCARDIOGRAM TRACING: CPT

## 2017-10-08 PROCEDURE — 96375 TX/PRO/DX INJ NEW DRUG ADDON: CPT

## 2017-10-08 PROCEDURE — 80048 BASIC METABOLIC PNL TOTAL CA: CPT | Performed by: EMERGENCY MEDICINE

## 2017-10-08 PROCEDURE — 96372 THER/PROPH/DIAG INJ SC/IM: CPT

## 2017-10-08 PROCEDURE — 25000125 ZZHC RX 250: Performed by: EMERGENCY MEDICINE

## 2017-10-08 PROCEDURE — 85025 COMPLETE CBC W/AUTO DIFF WBC: CPT | Performed by: EMERGENCY MEDICINE

## 2017-10-08 PROCEDURE — 80307 DRUG TEST PRSMV CHEM ANLYZR: CPT | Performed by: EMERGENCY MEDICINE

## 2017-10-08 PROCEDURE — 25000128 H RX IP 250 OP 636: Performed by: EMERGENCY MEDICINE

## 2017-10-08 PROCEDURE — 80320 DRUG SCREEN QUANTALCOHOLS: CPT | Performed by: EMERGENCY MEDICINE

## 2017-10-08 PROCEDURE — 96374 THER/PROPH/DIAG INJ IV PUSH: CPT

## 2017-10-08 RX ORDER — DIPHENHYDRAMINE HYDROCHLORIDE 50 MG/ML
50 INJECTION INTRAMUSCULAR; INTRAVENOUS ONCE
Status: COMPLETED | OUTPATIENT
Start: 2017-10-08 | End: 2017-10-08

## 2017-10-08 RX ORDER — OLANZAPINE 10 MG/2ML
10 INJECTION, POWDER, FOR SOLUTION INTRAMUSCULAR DAILY PRN
Status: DISCONTINUED | OUTPATIENT
Start: 2017-10-08 | End: 2017-10-09 | Stop reason: HOSPADM

## 2017-10-08 RX ORDER — HALOPERIDOL 5 MG/ML
5 INJECTION INTRAMUSCULAR ONCE
Status: COMPLETED | OUTPATIENT
Start: 2017-10-08 | End: 2017-10-08

## 2017-10-08 RX ORDER — WATER 10 ML/10ML
INJECTION INTRAMUSCULAR; INTRAVENOUS; SUBCUTANEOUS
Status: DISCONTINUED
Start: 2017-10-08 | End: 2017-10-09 | Stop reason: HOSPADM

## 2017-10-08 RX ADMIN — OLANZAPINE 10 MG: 10 INJECTION, POWDER, FOR SOLUTION INTRAMUSCULAR at 22:08

## 2017-10-08 RX ADMIN — DIPHENHYDRAMINE HYDROCHLORIDE 50 MG: 50 INJECTION, SOLUTION INTRAMUSCULAR; INTRAVENOUS at 21:11

## 2017-10-08 RX ADMIN — HALOPERIDOL LACTATE 5 MG: 5 INJECTION, SOLUTION INTRAMUSCULAR at 21:06

## 2017-10-08 ASSESSMENT — ENCOUNTER SYMPTOMS
NAUSEA: 1
VOMITING: 1

## 2017-10-08 NOTE — DISCHARGE INSTRUCTIONS

## 2017-10-08 NOTE — ED NOTES
Bed: ED18  Expected date: 10/8/17  Expected time: 6:29 PM  Means of arrival: Ambulance  Comments:  Saadia 37M ETOH

## 2017-10-08 NOTE — ED AVS SNAPSHOT
Emergency Department    [address] [place] [id number]    Phone:  [phone]    Fax:  [phone]                                       Olman Newby   MRN: [id number]    Department:   Emergency Department   Date of Visit:  10/8/2017           After Visit Summary Signature Page     I have received my discharge instructions, and my questions have been answered. I have discussed any challenges I see with this plan with the nurse or doctor.    ..........................................................................................................................................  Patient/Patient Representative Signature      ..........................................................................................................................................  Patient Representative Print Name and Relationship to Patient    ..................................................               ................................................  Date                                            Time    ..........................................................................................................................................  Reviewed by Signature/Title    ...................................................              ..............................................  Date                                                            Time

## 2017-10-08 NOTE — ED AVS SNAPSHOT
Emergency Department    [address] [facility] [address]    Phone:  [phone]    Fax:  [phone]                                       Olman Newby   MRN: [id number]    Department:   Emergency Department   Date of Visit:  10/8/2017           Patient Information     Date Of Birth          1985        Your diagnoses for this visit were:     Acute alcoholic intoxication in alcoholism without complication (H)        You were seen by Victor Hugo Valera MD and Mauricio Molina DO.      Follow-up Information     Follow up with  Emergency Department.    Specialty:  EMERGENCY MEDICINE    Why:  If symptoms worsen    Contact information:    [address] [facility] [address] [phone]        Follow up with Óscar Verdugo MD. Schedule an appointment as soon as possible for a visit in 1 week.    Specialty:  INTERNAL MEDICINE - ENDOCRINOLOGY, DIABETES & METABOLISM    Why:  To follow up from today's ED visit    Contact information:    [facility]  [address]  [facility] [address]  [phone]          Discharge Instructions       Discharge Instructions  Alcohol Intoxication    You have been seen today with alcohol intoxication. This means that you have enough alcohol in your system to impair your ability to mentally and physically function, perhaps to the extent that you were unable to care for yourself.    Generally, every Emergency Department visit should have a follow-up clinic visit with either a primary or a specialty clinic/provider. Please follow-up as instructed by your emergency provider today.    You may have come to the Emergency Department because of your intoxication, or for another reason, such as because of an injury. No matter what the case is, this visit is a  red flag  regarding alcohol use, and you should consider whether your drinking pattern is a problem for you.     You may be at risk for alcohol-related  problems if:      Men: you drink more than 14 drinks per week, or more than 4 drinks per occasion.      Women: you drink more than 7 drinks per week or more than 3 drinks per occasion.      You have black-outs.    You do things you regret while drinking.    You have legal problems because of drinking.    You have job problems because of drinking (you call in sick to work because of drinking).    CAGE Questions    Have you ever felt you should cut down on your drinking?    Have people annoyed you by criticizing your drinking?    Have you ever felt bad or guilty about your drinking?    Have you ever had a drink first thing in the morning to steady your nerves or get rid of a hangover (eye opener)?    If you answer yes to any of the CAGE questions, you may have a problem with alcohol.      Return to the Emergency Department if:    You become shaky or tremble when you try to stop drinking.     You have severe abdominal pain (belly pain).     You have a seizure or pass out.      You vomit (throw up) blood or have blood in your stool. This may be bright red or it may look like black coffee grounds.    You become lightheaded or faint.      For further help, contact:     Your caregiver.      Alcoholics Anonymous (AA).    o [facility]: [phone]  o [facility] Central Office: [phone]     A drug or alcohol rehabilitation program.      You can get information on alcohol resources and groups by calling the number 211 or [phone] on any phone.     Seek medical care if:    You have persistent vomiting.     You have persistent pain in any part of your body.      You do not feel better after a few days.    If you were given a prescription for medicine here today, be sure to read all of the information (including the package insert) that comes with your prescription.  This will include important information about the medicine, its side effects, and any warnings that you need to know  about.  The pharmacist who fills the prescription can provide more information and answer questions you may have about the medicine.  If you have questions or concerns that the pharmacist cannot address, please call or return to the Emergency Department.   Remember that you can always come back to the Emergency Department if you are not able to see your regular doctor in the amount of time listed above, if you get any new symptoms, or if there is anything that worries you.      24 Hour Appointment Hotline       To make an appointment at any [facility], call [phone] ([phone]). If you don't have a family doctor or clinic, we will help you find one. [facility] clinics are conveniently located to serve the needs of you and your family.             Review of your medicines      Our records show that you are taking the medicines listed below. If these are incorrect, please call your family doctor or clinic.        Dose / Directions Last dose taken    aspirin 81 MG tablet   Dose:  1 tablet        Take 1 tablet by mouth daily.   Refills:  0        LANTUS SC        Inject  Subcutaneous daily.   Refills:  0        NOVOLOG SC        Inject  Subcutaneous daily.   Refills:  0                Procedures and tests performed during your visit     Acetaminophen level    Alcohol level blood    Basic metabolic panel    CBC with platelets differential    Drug abuse screen 77 urine    EKG 12 lead    Salicylate level      Orders Needing Specimen Collection     None      Pending Results     No orders found for last 3 day(s).            Pending Culture Results     No orders found for last 3 day(s).            Pending Results Instructions     If you had any lab results that were not finalized at the time of your Discharge, you can call the ED Lab Result RN at [phone]. You will be contacted by this team for any positive Lab results or changes in treatment. The nurses are available 7 days a week from 10A to 6:30P.  You  can leave a message 24 hours per day and they will return your call.        Test Results From Your Hospital Stay        10/8/2017  7:45 PM      Component Results     Component Value Ref Range & Units Status    Ethanol g/dL 0.30 (H) <0.01 g/dL Final    Specimen run with a dilution         10/8/2017  7:45 PM      Component Results     Component Value Ref Range & Units Status    Sodium 143 133 - 144 mmol/L Final    Potassium 4.0 3.4 - 5.3 mmol/L Final    Chloride 111 (H) 94 - 109 mmol/L Final    Carbon Dioxide 24 20 - 32 mmol/L Final    Anion Gap 8 3 - 14 mmol/L Final    Glucose 120 (H) 70 - 99 mg/dL Final    Urea Nitrogen 9 7 - 30 mg/dL Final    Creatinine 0.74 0.66 - 1.25 mg/dL Final    GFR Estimate >90 >60 mL/min/1.7m2 Final    Non  GFR Calc    GFR Estimate If Black >90 >60 mL/min/1.7m2 Final    African American GFR Calc    Calcium 8.1 (L) 8.5 - 10.1 mg/dL Final         10/8/2017  7:16 PM      Component Results     Component Value Ref Range & Units Status    WBC 7.2 4.0 - 11.0 10e9/L Final    RBC Count 5.22 4.4 - 5.9 10e12/L Final    Hemoglobin 16.5 13.3 - 17.7 g/dL Final    Hematocrit 45.6 40.0 - 53.0 % Final    MCV 87 78 - 100 fl Final    MCH 31.6 26.5 - 33.0 pg Final    MCHC 36.2 31.5 - 36.5 g/dL Final    RDW 12.4 10.0 - 15.0 % Final    Platelet Count 292 150 - 450 10e9/L Final    Diff Method Automated Method  Final    % Neutrophils 56.0 % Final    % Lymphocytes 36.9 % Final    % Monocytes 4.4 % Final    % Eosinophils 1.8 % Final    % Basophils 0.6 % Final    % Immature Granulocytes 0.3 % Final    Nucleated RBCs 0 0 /100 Final    Absolute Neutrophil 4.0 1.6 - 8.3 10e9/L Final    Absolute Lymphocytes 2.7 0.8 - 5.3 10e9/L Final    Absolute Monocytes 0.3 0.0 - 1.3 10e9/L Final    Absolute Eosinophils 0.1 0.0 - 0.7 10e9/L Final    Absolute Basophils 0.0 0.0 - 0.2 10e9/L Final    Abs Immature Granulocytes 0.0 0 - 0.4 10e9/L Final    Absolute Nucleated RBC 0.0  Final         10/8/2017  7:32 PM       Component Results     Component Value Ref Range & Units Status    Acetaminophen Level <2 mg/L Final    Therapeutic range: 10-20 mg/L         10/8/2017  7:32 PM      Component Results     Component Value Ref Range & Units Status    Salicylate Level <2 mg/dL Final    Therapeutic:        <20  Anti inflammatory:  15-30           10/8/2017  8:20 PM      Component Results     Component Value Ref Range & Units Status    Amphetamine Qual Urine Negative NEG^Negative Final    Cutoff for a negative amphetamine is 500 ng/mL or less.    Barbiturates Qual Urine Negative NEG^Negative Final    Cutoff for a negative barbiturate is 200 ng/mL or less.    Benzodiazepine Qual Urine Negative NEG^Negative Final    Cutoff for a negative benzodiazepine is 200 ng/mL or less.    Cannabinoids Qual Urine Negative NEG^Negative Final    Cutoff for a negative cannabinoid is 50 ng/mL or less.    Cocaine Qual Urine Negative NEG^Negative Final    Cutoff for a negative cocaine is 300 ng/mL or less.    Opiates Qualitative Urine Negative NEG^Negative Final    Cutoff for a negative opiate is 300 ng/mL or less.    PCP Qual Urine Negative NEG^Negative Final    Cutoff for a negative PCP is 25 ng/mL or less.                Clinical Quality Measure: Blood Pressure Screening     Your blood pressure was checked while you were in the emergency department today. The last reading we obtained was  BP: 111/68 . Please read the guidelines below about what these numbers mean and what you should do about them.  If your systolic blood pressure (the top number) is less than 120 and your diastolic blood pressure (the bottom number) is less than 80, then your blood pressure is normal. There is nothing more that you need to do about it.  If your systolic blood pressure (the top number) is 120-139 or your diastolic blood pressure (the bottom number) is 80-89, your blood pressure may be higher than it should be. You should have your blood pressure rechecked within a year by  "a primary care provider.  If your systolic blood pressure (the top number) is 140 or greater or your diastolic blood pressure (the bottom number) is 90 or greater, you may have high blood pressure. High blood pressure is treatable, but if left untreated over time it can put you at risk for heart attack, stroke, or kidney failure. You should have your blood pressure rechecked by a primary care provider within the next 4 weeks.  If your provider in the emergency department today gave you specific instructions to follow-up with your doctor or provider even sooner than that, you should follow that instruction and not wait for up to 4 weeks for your follow-up visit.        Thank you for choosing [facility]       Thank you for choosing [facility] for your care. Our goal is always to provide you with excellent care. Hearing back from our patients is one way we can continue to improve our services. Please take a few minutes to complete the written survey that you may receive in the mail after you visit with us. Thank you!        [employer] Information     [employer] lets you send messages to your doctor, view your test results, renew your prescriptions, schedule appointments and more. To sign up, go to [email or web address] . Click on \"Log in\" on the left side of the screen, which will take you to the Welcome page. Then click on \"Sign up Now\" on the right side of the page.     You will be asked to enter the access code listed below, as well as some personal information. Please follow the directions to create your username and password.     Your access code is: [email or web address]  Expires: 2017  1:31 AM     Your access code will  in 90 days. If you need help or a new code, please call your [facility] clinic or [phone].        Care EveryWhere ID     This is your Care EveryWhere ID. This could be used by other organizations to access your [facility] medical records  [id number]        Equal Access to Services     MARLY SCANLON" AH: Shelly Macdonald, wapattie ludialloadaha, qafransiscata kaboogie oliva. So [facility] [phone].    ATENCIÓN: Si habla español, tiene a vora disposición servicios gratuitos de asistencia lingüística. Llame al [phone].    We comply with applicable federal civil rights laws and [place] laws. We do not discriminate on the basis of race, color, national origin, age, disability, sex, sexual orientation, or gender identity.            After Visit Summary       This is your record. Keep this with you and show to your community pharmacist(s) and doctor(s) at your next visit.

## 2017-10-09 VITALS
OXYGEN SATURATION: 98 % | TEMPERATURE: 97.3 F | SYSTOLIC BLOOD PRESSURE: 112 MMHG | RESPIRATION RATE: 14 BRPM | HEART RATE: 86 BPM | DIASTOLIC BLOOD PRESSURE: 70 MMHG

## 2017-10-09 NOTE — ED NOTES
Patient given all belongings, clothing.  [place], juice & water given.    Patient cooperative, awake, alert.  Gait steady.

## 2017-10-09 NOTE — ED NOTES
Restraints discontinued.  Dr Molina discussed boundaries & expectations with patient.  Patient verbalizes understanding.    Restraints removed with RN, security & EMT without incident.  No injury noted to extremities.  CMS intact, skin warm, dry, pink.  Capillary refill <2.    Patient cooperative.  Ambulated patient from Rm 18 to  Rm1.  Gait steady.  IV discontinued from left hand also.  Water given to patient.    Patient lying on left lateral side.  Respirations easy, non-labored.  Rate 14.  Skin warm, dry, pink.  Will continue to monitor.

## 2017-10-09 NOTE — ED PROVIDER NOTES
History     Chief Complaint:  Alcohol intoxication    HPI   History limited, patient was primarily non-verbal and severely intoxicated during evaluation.   Olman Newby is a 31 year old male who presents with alcohol intoxication. Nurse reports that he was at family function, and the people there did not know how much he had to drink and was brought here by EMS. Patient was vomiting on arrival to the emergency department. Upon evaluation, patient mentioned drinking beer and vodka. Denied using any other drugs tonight and any injuries or trauma.     Allergies:  Sulfa drugs     Medications:    Insulin Aspart  Insulin Glargine  Aspirin    Past Medical History:    Diabetes  Substance abuse    Past Surgical History:    History reviewed. No pertinent surgical history.    Family History:    History reviewed. No pertinent family history.     Social History:  Smoking status: Yes  Alcohol use: Yes  Marital Status:  Single [1]     Review of Systems   Gastrointestinal: Positive for nausea and vomiting.     Patient was mostly non-verbal & acutely intoxicated upon evaluation, ROS limited.    Physical Exam   Patient Vitals for the past 24 hrs:   BP Temp Temp src Pulse Resp SpO2   10/08/17 2030 92/56 - - - - 96 %   10/08/17 1841 102/63 97.3  F (36.3  C) Temporal 89 16 95 %     Physical Exam  General: Intoxicated, appears well-developed and well-nourished. Vomiting at bedside.   HEENT:  Head:  Atraumatic  Ears:  External ears are normal  Mouth/Throat:  Oropharynx is without erythema or exudate and mucous membranes are dry.   Eyes:   Conjunctivae normal and EOM are normal. No scleral icterus.    Pupils are equal, round, and reactive to light.   Neck:   Normal range of motion. Neck supple.  CV:  Normal rate, regular rhythm, normal heart sounds and radial and dorsalis pedis pulses are 2+ and symmetric.  No murmur.  Resp:  Breath sounds are clear bilaterally    Non-labored, no retractions or accessory muscle use  GI:  Abdomen  is soft, no distension, no tenderness. No rebound or guarding.  MS:  Normal range of motion. No edema.    Normal strength in all 4 extremities.     Back atraumatic.  Skin:  Warm and dry.  No rash or lesions noted.  Neuro: Intoxicated, confused.  Normal strength.  Sensation intact in all 4 extremities. GCS: 14  Psych:  Depressed mood and affect.    Emergency Department Course   ECG (19:28:30):  Rate 79 bpm. FL interval 136. QRS duration 82. QT/QTc 362/415. P-R-T axes 54 71 54. Sinus rhythm. Normal ECG. When compared with ECG of 3/23/15. FL interval has increased. Interpreted at 2113 by Victor Hugo Valera MD. No significant change compared to EKG dated 3/23/15.    Laboratory:  CBC: WNL (WBC 7.2, HGB 16.5, )   BMP: Chloride 111 (H), Glucose 120 (H), Calcium 8.1 (L) WNL (Creatinine 0.74)  Alcohol: 0.30 (H)  Drug abuse: Negative  Acetaminophen level: <2  Salicylate level: <2    Interventions:  2106: Haldol 5 mg IV  2111: Benadryl 50 mg IV  2208: Zyprexa 10 mg IM    Emergency Department Course:  The patient arrived in the emergency department via EMS.  Past medical records, nursing notes, and vitals reviewed.  1851: I performed an exam of the patient and obtained history, as documented above.   IV inserted and blood drawn.  ECG obtained, results above.   I rechecked the patient. Findings and plan explained to the Patient. Patient discharged home with instructions regarding supportive care, medications, and reasons to return. The importance of close follow-up was reviewed.     Impression & Plan    CMS Diagnoses: None.    Medical Decision Making:  Olman Newby is a 31 year old male who presents for evaluation of alcohol intoxication. Patient was taken here by EMS. They are intoxicated here in ED by lab evaluation with a blood alcohol level of 0.30. The patient was mostly non-verbal upon evaluation, but noted he had beer and vodka prior to arrival. Patient has no history of Delirium tremens or alcohol withdrawal  seizures. There are no signs of co-ingestion including acetaminophen, drugs, medications, volatile alcohols.  EKG was non-ischemic, no concerning changes concerning for toxic overdoses.  There are no signs of trauma related to alcohol use and no further workup is needed including head CT. Patient was given Haldol, Benadryl, and Zyprexa here in the emergency department due to significant agitation requiring sedation for patient and staff safety.  Patient care was transferred to Dr. Molina awaiting clinical sobering and re-assessment.        Diagnosis:    ICD-10-CM    1. Accidental poisoning by alcohol, initial encounter T51.91XA Alcohol level blood     Basic metabolic panel     CBC with platelets differential     Acetaminophen level     Salicylate level     Drug abuse screen 77 urine   2. Acute alcoholic intoxication in alcoholism without complication (H) F10.220      Disposition:  discharged to home    Kandis Mendiola  10/8/2017    EMERGENCY DEPARTMENT  CARMEN Kandis Mendiola, am serving as a scribe at 6:51 PM on 10/8/2017 to document services personally performed by Victor Hugo Valera MD based on my observations and the provider's statements to me.        Victor Hugo Valera MD  10/08/17 9795

## 2017-10-09 NOTE — ED NOTES
ED course:  Patient received as a hand off from my partner Dr. Valera. On face-to-face evaluation patient was in 5 point restraints and yelling obscenities. Later in ED course patient settled down and restraints were able to be removed and he was calm and cooperative for the remainder of my care. He was monitored in the ED and at this time is clinically sober. The patient is awaiting a ride from his mother and was discharged to the lobby/self-care. Resources for alcohol abuse offered to the patient are deferred. Return precautions discussed.    Impression:    ICD-10-CM    1. Acute alcoholic intoxication in alcoholism without complication (H) F10.220      Disposition:  Discharged to self-care     Mauricio Molina DO  10/09/17 0631

## 2018-01-23 ENCOUNTER — HOSPITAL ENCOUNTER (EMERGENCY)
Facility: CLINIC | Age: 33
Discharge: HOME | End: 2018-01-24
Attending: EMERGENCY MEDICINE | Admitting: EMERGENCY MEDICINE
Payer: COMMERCIAL

## 2018-01-23 DIAGNOSIS — F15.10 METHAMPHETAMINE USE (H): ICD-10-CM

## 2018-01-23 DIAGNOSIS — E16.2 HYPOGLYCEMIA: ICD-10-CM

## 2018-01-23 LAB
ANION GAP SERPL CALCULATED.3IONS-SCNC: 6 MMOL/L (ref 3–14)
BUN SERPL-MCNC: 12 MG/DL (ref 7–30)
CALCIUM SERPL-MCNC: 7.8 MG/DL (ref 8.5–10.1)
CHLORIDE SERPL-SCNC: 106 MMOL/L (ref 94–109)
CO2 SERPL-SCNC: 27 MMOL/L (ref 20–32)
CREAT SERPL-MCNC: 0.73 MG/DL (ref 0.66–1.25)
ERYTHROCYTE [DISTWIDTH] IN BLOOD BY AUTOMATED COUNT: 12.3 % (ref 10–15)
ETHANOL SERPL-MCNC: <0.01 G/DL
GFR SERPL CREATININE-BSD FRML MDRD: >90 ML/MIN/1.7M2
GLUCOSE BLDC GLUCOMTR-MCNC: 155 MG/DL (ref 70–99)
GLUCOSE SERPL-MCNC: 57 MG/DL (ref 70–99)
HCT VFR BLD AUTO: 44.2 % (ref 40–53)
HGB BLD-MCNC: 15.5 G/DL (ref 13.3–17.7)
MCH RBC QN AUTO: 30.9 PG (ref 26.5–33)
MCHC RBC AUTO-ENTMCNC: 35.1 G/DL (ref 31.5–36.5)
MCV RBC AUTO: 88 FL (ref 78–100)
PLATELET # BLD AUTO: 302 10E9/L (ref 150–450)
POTASSIUM SERPL-SCNC: 4 MMOL/L (ref 3.4–5.3)
RBC # BLD AUTO: 5.01 10E12/L (ref 4.4–5.9)
SODIUM SERPL-SCNC: 139 MMOL/L (ref 133–144)
WBC # BLD AUTO: 10.6 10E9/L (ref 4–11)

## 2018-01-23 PROCEDURE — 85027 COMPLETE CBC AUTOMATED: CPT | Performed by: EMERGENCY MEDICINE

## 2018-01-23 PROCEDURE — 96361 HYDRATE IV INFUSION ADD-ON: CPT

## 2018-01-23 PROCEDURE — 80320 DRUG SCREEN QUANTALCOHOLS: CPT | Performed by: EMERGENCY MEDICINE

## 2018-01-23 PROCEDURE — 00000146 ZZHCL STATISTIC GLUCOSE BY METER IP

## 2018-01-23 PROCEDURE — 25000128 H RX IP 250 OP 636

## 2018-01-23 PROCEDURE — 96374 THER/PROPH/DIAG INJ IV PUSH: CPT

## 2018-01-23 PROCEDURE — 25000128 H RX IP 250 OP 636: Performed by: EMERGENCY MEDICINE

## 2018-01-23 PROCEDURE — 99285 EMERGENCY DEPT VISIT HI MDM: CPT | Mod: 25

## 2018-01-23 PROCEDURE — 80048 BASIC METABOLIC PNL TOTAL CA: CPT | Performed by: EMERGENCY MEDICINE

## 2018-01-23 PROCEDURE — 93005 ELECTROCARDIOGRAM TRACING: CPT

## 2018-01-23 RX ORDER — LORAZEPAM 2 MG/ML
INJECTION INTRAMUSCULAR
Status: COMPLETED
Start: 2018-01-23 | End: 2018-01-23

## 2018-01-23 RX ORDER — LORAZEPAM 2 MG/ML
1 INJECTION INTRAMUSCULAR ONCE
Status: COMPLETED | OUTPATIENT
Start: 2018-01-23 | End: 2018-01-23

## 2018-01-23 RX ADMIN — LORAZEPAM 1 MG: 2 INJECTION INTRAMUSCULAR at 21:00

## 2018-01-23 RX ADMIN — SODIUM CHLORIDE 1000 ML: 9 INJECTION, SOLUTION INTRAVENOUS at 21:03

## 2018-01-23 RX ADMIN — LORAZEPAM 1 MG: 2 INJECTION INTRAMUSCULAR; INTRAVENOUS at 21:00

## 2018-01-23 ASSESSMENT — ENCOUNTER SYMPTOMS
CHILLS: 0
FEVER: 0
COUGH: 0

## 2018-01-23 NOTE — ED AVS SNAPSHOT
[facility] Emergency Department    [address]    [facility] [address]    Phone:  [phone]    Fax:  [phone]                                       Olman Newby   MRN: [id number]    Department:  [facility] Emergency Department   Date of Visit:  1/23/2018           Patient Information     Date Of Birth          1985        Your diagnoses for this visit were:     Hypoglycemia     Methamphetamine use        You were seen by August Peralta MD and Yan Russ MD.      Follow-up Information     Follow up with Diane Brice.    Specialty:  Family Practice    Contact information:    [facility]  [address]  [facility] [address]  [phone]          Follow up with [facility] Emergency Department.    Specialty:  EMERGENCY MEDICINE    Why:  If symptoms worsen    Contact information:    [address]  [facility] [id number] [phone]        Discharge Instructions       Please follow-up with your scheduled chemical dependency intake appointment tomorrow as scheduled.    Please continue to monitor your blood sugars closely.    Return to the ER with any other new or troubling symptoms.        Recovering from Addiction: Continuing with Counseling  The road to recovery can be tough. But working with a counselor can help make your recovery smoother and keep you on track. A counselor can help you decide which lifestyle changes you want to make to stay sober. Also, consider talking with a counselor about other issues you may want to work on. He or she can help you find resources for anger management, problem-solving skills, or assertiveness training.    Be aware of your triggers  Triggers are things that make you want to use again. They can be people you used with or places, things, and events that make you want to use. Stress and feelings like loneliness, anxiety, or depression can also make you want to use  "again. When you know what your triggers are, you can plan ways to avoid them when possible. To find your triggers, get a piece of paper. List the people, places, events, or feelings that could make you want to use again. Keep this paper. Add to it as needed. Work with your counselor on how to cope with these triggers without using.  Getting help  Once you admit that you have a substance abuse problem, there are many ways to find help.    Contact your Employee Assistance Program (EAP) or Human Resources department.    Talk to your primary care doctor and ask for a referral to an addiction specialist for an evaluation.    Look in the white pages of your phone book for local chapters of these groups:    Alcoholics Anonymous    Cocaine Anonymous    Marijuana Anonymous    Narcotics Anonymous    Look in the yellow pages of your phone book under one of the following:    Alcoholism Information and Treatment Center    Drug Abuse and Addiction Information and Treatment Center    Look online for treatment centers near you:    Substance Abuse and Mental Health Services Administration's \"treatment finder\": http://findtreatment.samhsa,gov    Contact one of these national groups:    National [place] on Alcoholism and Drug Dependence  999.969.7987    National Drug and Alcohol Treatment Referral Service  495-936-HELP (038-891-5068)   Date Last Reviewed: 2/1/2017 2000-2017 [employer]. [address] [place] [address]. All rights reserved. This information is not intended as a substitute for professional medical care. Always follow your healthcare professional's instructions.          24 Hour Appointment Hotline       To make an appointment at any [facility], call [phone] ([phone]). If you don't have a family doctor or clinic, we will help you find one. [place] clinics are conveniently located to serve the needs of you and your family.             Review of your medicines      Our records " show that you are taking the medicines listed below. If these are incorrect, please call your family doctor or clinic.        Dose / Directions Last dose taken    aspirin 81 MG tablet   Dose:  1 tablet        Take 1 tablet by mouth daily.   Refills:  0        LANTUS SC        Inject  Subcutaneous daily.   Refills:  0        NOVOLOG SC        Inject  Subcutaneous daily.   Refills:  0                Procedures and tests performed during your visit     Procedure/Test Number of Times Performed    Alcohol level blood 1    Basic metabolic panel (BMP) 1    CBC (platelets, no diff) 1    EKG 12 lead 1    Glucose by meter 2      Orders Needing Specimen Collection     None      Pending Results     Date and Time Order Name Status Description    1/23/2018 2100 EKG 12 lead Preliminary             Pending Culture Results     No orders found for last 3 day(s).            Pending Results Instructions     If you had any lab results that were not finalized at the time of your Discharge, you can call the ED Lab Result RN at [phone]. You will be contacted by this team for any positive Lab results or changes in treatment. The nurses are available 7 days a week from 10A to 6:30P.  You can leave a message 24 hours per day and they will return your call.        Test Results From Your Hospital Stay        1/23/2018  9:19 PM      Component Results     Component Value Ref Range & Units Status    WBC 10.6 4.0 - 11.0 10e9/L Final    RBC Count 5.01 4.4 - 5.9 10e12/L Final    Hemoglobin 15.5 13.3 - 17.7 g/dL Final    Hematocrit 44.2 40.0 - 53.0 % Final    MCV 88 78 - 100 fl Final    MCH 30.9 26.5 - 33.0 pg Final    MCHC 35.1 31.5 - 36.5 g/dL Final    RDW 12.3 10.0 - 15.0 % Final    Platelet Count 302 150 - 450 10e9/L Final         1/23/2018  9:33 PM      Component Results     Component Value Ref Range & Units Status    Sodium 139 133 - 144 mmol/L Final    Potassium 4.0 3.4 - 5.3 mmol/L Final    Chloride 106 94 - 109 mmol/L Final    Carbon  Dioxide 27 20 - 32 mmol/L Final    Anion Gap 6 3 - 14 mmol/L Final    Glucose 57 (L) 70 - 99 mg/dL Final    Urea Nitrogen 12 7 - 30 mg/dL Final    Creatinine 0.73 0.66 - 1.25 mg/dL Final    GFR Estimate >90 >60 mL/min/1.7m2 Final    Non  GFR Calc    GFR Estimate If Black >90 >60 mL/min/1.7m2 Final    African American GFR Calc    Calcium 7.8 (L) 8.5 - 10.1 mg/dL Final         1/23/2018  9:33 PM      Component Results     Component Value Ref Range & Units Status    Ethanol g/dL <0.01 <0.01 g/dL Final         1/23/2018 10:50 PM      Component Results     Component Value Ref Range & Units Status    Glucose 155 (H) 70 - 99 mg/dL Final         1/24/2018 12:21 AM      Component Results     Component Value Ref Range & Units Status    Glucose 343 (H) 70 - 99 mg/dL Final                Clinical Quality Measure: Blood Pressure Screening     Your blood pressure was checked while you were in the emergency department today. The last reading we obtained was  BP: (!) 167/106 . Please read the guidelines below about what these numbers mean and what you should do about them.  If your systolic blood pressure (the top number) is less than 120 and your diastolic blood pressure (the bottom number) is less than 80, then your blood pressure is normal. There is nothing more that you need to do about it.  If your systolic blood pressure (the top number) is 120-139 or your diastolic blood pressure (the bottom number) is 80-89, your blood pressure may be higher than it should be. You should have your blood pressure rechecked within a year by a primary care provider.  If your systolic blood pressure (the top number) is 140 or greater or your diastolic blood pressure (the bottom number) is 90 or greater, you may have high blood pressure. High blood pressure is treatable, but if left untreated over time it can put you at risk for heart attack, stroke, or kidney failure. You should have your blood pressure rechecked by a primary  "care provider within the next 4 weeks.  If your provider in the emergency department today gave you specific instructions to follow-up with your doctor or provider even sooner than that, you should follow that instruction and not wait for up to 4 weeks for your follow-up visit.        Thank you for choosing [facility]       Thank you for choosing [facility] for your care. Our goal is always to provide you with excellent care. Hearing back from our patients is one way we can continue to improve our services. Please take a few minutes to complete the written survey that you may receive in the mail after you visit with us. Thank you!        [employer] Information     [employer] lets you send messages to your doctor, view your test results, renew your prescriptions, schedule appointments and more. To sign up, go to [email or web address] . Click on \"Log in\" on the left side of the screen, which will take you to the Welcome page. Then click on \"Sign up Now\" on the right side of the page.     You will be asked to enter the access code listed below, as well as some personal information. Please follow the directions to create your username and password.     Your access code is: [email or web address]  Expires: 2018 12:54 AM     Your access code will  in 90 days. If you need help or a new code, please call your [facility] clinic or [phone].        Care EveryWhere ID     This is your Care EveryWhere ID. This could be used by other organizations to access your [facility] medical records  [id number]        Equal Access to Services     MARLY SCANLON : Hadii debi zepeda Soevelin, waaxda ludialloadaha, qaybta kaalmaboogie howe . So [facility] [phone].    ATENCIÓN: Si habla español, tiene a vora disposición servicios gratuitos de asistencia lingüística. Llame al [phone].    We comply with applicable federal civil rights laws and [place] laws. We do not discriminate on the basis of race, color, " national origin, age, disability, sex, sexual orientation, or gender identity.            After Visit Summary       This is your record. Keep this with you and show to your community pharmacist(s) and doctor(s) at your next visit.

## 2018-01-23 NOTE — ED AVS SNAPSHOT
[facility] Emergency Department    [address]    [facility] [address]    Phone:  [phone]    Fax:  [phone]                                       Olman Newby   MRN: [id number]    Department:  [facility] Emergency Department   Date of Visit:  1/23/2018           After Visit Summary Signature Page     I have received my discharge instructions, and my questions have been answered. I have discussed any challenges I see with this plan with the nurse or doctor.    ..........................................................................................................................................  Patient/Patient Representative Signature      ..........................................................................................................................................  Patient Representative Print Name and Relationship to Patient    ..................................................               ................................................  Date                                            Time    ..........................................................................................................................................  Reviewed by Signature/Title    ...................................................              ..............................................  Date                                                            Time

## 2018-01-24 VITALS
OXYGEN SATURATION: 100 % | HEART RATE: 68 BPM | WEIGHT: 170 LBS | SYSTOLIC BLOOD PRESSURE: 130 MMHG | TEMPERATURE: 97.8 F | HEIGHT: 68 IN | DIASTOLIC BLOOD PRESSURE: 95 MMHG | BODY MASS INDEX: 25.76 KG/M2 | RESPIRATION RATE: 16 BRPM

## 2018-01-24 LAB
GLUCOSE BLDC GLUCOMTR-MCNC: 343 MG/DL (ref 70–99)
INTERPRETATION ECG - MUSE: NORMAL

## 2018-01-24 PROCEDURE — 00000146 ZZHCL STATISTIC GLUCOSE BY METER IP

## 2018-01-24 NOTE — DISCHARGE INSTRUCTIONS
Please follow-up with your scheduled chemical dependency intake appointment tomorrow as scheduled.    Please continue to monitor your blood sugars closely.    Return to the ER with any other new or troubling symptoms.        Recovering from Addiction: Continuing with Counseling  The road to recovery can be tough. But working with a counselor can help make your recovery smoother and keep you on track. A counselor can help you decide which lifestyle changes you want to make to stay sober. Also, consider talking with a counselor about other issues you may want to work on. He or she can help you find resources for anger management, problem-solving skills, or assertiveness training.    Be aware of your triggers  Triggers are things that make you want to use again. They can be people you used with or places, things, and events that make you want to use. Stress and feelings like loneliness, anxiety, or depression can also make you want to use again. When you know what your triggers are, you can plan ways to avoid them when possible. To find your triggers, get a piece of paper. List the people, places, events, or feelings that could make you want to use again. Keep this paper. Add to it as needed. Work with your counselor on how to cope with these triggers without using.  Getting help  Once you admit that you have a substance abuse problem, there are many ways to find help.    Contact your Employee Assistance Program (EAP) or Human Resources department.    Talk to your primary care doctor and ask for a referral to an addiction specialist for an evaluation.    Look in the white pages of your phone book for local chapters of these groups:    Alcoholics Anonymous    Cocaine Anonymous    Marijuana Anonymous    Narcotics Anonymous    Look in the yellow pages of your phone book under one of the following:    Alcoholism Information and Treatment Center    Drug Abuse and Addiction Information and Treatment Center    Look online for  "treatment centers near you:    Substance Abuse and Mental Health Services Administration's \"treatment finder\": http://findtreatment.samhsa,gov    Contact one of these national groups:    National [place] on Alcoholism and Drug Dependence  364.445.2586    [place] Drug and Alcohol Treatment Referral Service  816-949-HELP ([phone])   Date Last Reviewed: 2/1/2017 2000-2017 [employer]. [address] [place] [address]. All rights reserved. This information is not intended as a substitute for professional medical care. Always follow your healthcare professional's instructions.        "

## 2018-01-24 NOTE — ED NOTES
Patient's father present at bedside. MD notified and came to room. Father states comfortable taking patient home.
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
## 2018-01-24 NOTE — ED NOTES
Type 1 Diabetic. On lantus and novolog. Took crystal meth capsule around 1830, felt terrible, felt tweaking more than normal. EMS arrival BS 34. Given 15 Glucose and Amp D50.  BS after 114.

## 2018-01-24 NOTE — ED NOTES
Bed: ED30  Expected date: 1/23/18  Expected time: 8:21 PM  Means of arrival: Ambulance  Comments:  Ja Vargas

## 2018-03-01 DIAGNOSIS — E10.9 TYPE 1 DIABETES MELLITUS WITHOUT COMPLICATION (H): Primary | ICD-10-CM

## 2018-03-20 ENCOUNTER — OFFICE VISIT (OUTPATIENT)
Dept: ENDOCRINOLOGY | Facility: CLINIC | Age: 33
End: 2018-03-20
Payer: COMMERCIAL

## 2018-03-20 VITALS
WEIGHT: 170 LBS | SYSTOLIC BLOOD PRESSURE: 123 MMHG | BODY MASS INDEX: 25.76 KG/M2 | HEIGHT: 68 IN | DIASTOLIC BLOOD PRESSURE: 75 MMHG | HEART RATE: 94 BPM

## 2018-03-20 DIAGNOSIS — E10.9 TYPE 1 DIABETES MELLITUS WITHOUT COMPLICATION (H): Primary | ICD-10-CM

## 2018-03-20 PROBLEM — T14.8XXA CRUSH INJURY: Status: ACTIVE | Noted: 2017-12-22

## 2018-03-20 PROCEDURE — 99214 OFFICE O/P EST MOD 30 MIN: CPT | Performed by: INTERNAL MEDICINE

## 2018-03-20 RX ORDER — BLOOD SUGAR DIAGNOSTIC
STRIP MISCELLANEOUS
Refills: 3 | COMMUNITY
Start: 2018-03-08 | End: 2018-03-20

## 2018-03-20 RX ORDER — BLOOD SUGAR DIAGNOSTIC
STRIP MISCELLANEOUS
Qty: 100 STRIP | Refills: 11 | Status: SHIPPED | OUTPATIENT
Start: 2018-03-20 | End: 2019-07-04

## 2018-03-20 NOTE — LETTER
3/20/2018         RE: Olman Newby  [address] [place] [address]        Dear Colleague,    Thank you for referring your patient, Olman Newby, to the [facility]. Please see a copy of my visit note below.    Name: Olman Newby is a 32 year old man self-referred for evaluation of diabetes mellitus.  Previously seen by me at my former clinic ([facility]), here to establish care with [facility].    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  Treatment with insulin injections  Using base-dose rapid acting insulin and daily long-acting insulin MDI plan  Infrequent BG testing, chronic higher hgbA1c levels in the 7.5-8.5% range overall  Current DM meds:   Novolog as 9U- Nv10- Nv10    Guestimates Nv sscale   Levemir 14U QHS  Using Verio BG meter, tests 0-3x/day  Previous [facility] labs include:  Lab Results   Component Value Date     01/23/2018    POTASSIUM 4.0 01/23/2018    CHLORIDE 106 01/23/2018    CO2 27 01/23/2018    ANIONGAP 6 01/23/2018    GLC 57 (L) 01/23/2018    BUN 12 01/23/2018    CR 0.73 01/23/2018    GFRESTIMATED >90 01/23/2018    GFRESTBLACK >90 01/23/2018    JOHN 7.8 (L) 01/23/2018     History of substance abuse with alcohol, metamphetamines, reports abstinence of meth x 1 week.  DM Complications:  None known  Details of last eye exam unclear.    Sees Dr. Diane Brice/Ja Osorio for general medicine evaluations.    PMH/PSH:  Past Medical History:   Diagnosis Date     Anxiety      Depression      Hypoglycemia      Substance  "abuse      Type 1 diabetes (H)      No past surgical history on file.    Family Hx:  No family history on file.      Social Hx:  Social History     Social History     Marital status: Single     Spouse name: N/A     Number of children: N/A     Years of education: N/A     Occupational History     Not on file.     Social History Main Topics     Smoking status: Current Every Day Smoker     Packs/day: 0.50     Smokeless tobacco: Never Used     Alcohol use Yes      Comment: occ     Drug use: Yes     Special: Methamphetamines     Sexual activity: Not on file     Other Topics Concern     Not on file     Social History Narrative          MEDICATIONS:  has a current medication list which includes the following prescription(s): blood glucose calibration, onetouch verio iq, insulin detemir, insulin syringe-needle u-100, insulin aspart, insulin glargine, and aspirin.    ROS:     ROS: 10 point ROS neg other than the symptoms noted above in the HPI.    GENERAL: no weight changes, fatigue, fevers, chills, night sweats.   HEENT: no dysphagia, odonophagia, diplopia, neck pain  THYROID:  no apparent hyper or hypothyroid symptoms  CV: no chest pain, pressure, palpitations  LUNGS: no SOB, BRYAN, cough, wheezing   ABDOMEN: no diarrhea, constipation, abdominal pain  EXTREMITIES: no rashes, ulcers, edema  NEUROLOGY: no headaches, denies changes in vision, tingling, extremitiy numbness   MSK: no muscle aches or pains, weakness  SKIN: no rashes or lesions  PSYCH:  stable mood, no significant anxiety or depression  ENDOCRINE: no heat or cold intolerance    Physical Exam   VS: /75 (BP Location: Right arm, Cuff Size: Adult Regular)  Pulse 94  Ht 5' 8\" (1.727 m)  Wt 170 lb (77.1 kg)  BMI 25.85 kg/m2  GENERAL: AXOX3, NAD, slender, well dressed, answering questions appropriately, appears stated age.  THYROID:  normal gland, no apparent nodules or goiter  HEENT: neck non-tender, no exopthalmous, no proptosis, EOMI  CV: RRR, no rubs, " gallops, no murmurs  LUNGS: CTAB, no wheezes, rales, or ronchi  ABDOMEN: soft, nontender, nondistended  EXTREMITIES: right hand with abrasions at dorsum metacarpal areas/nuckles; no edema, +pedal pulses  NEUROLOGY: CN grossly intact, no tremors  MSK: grossly intact  SKIN: scalp balding; no skin rashes or lesions noted    LABS:    All pertinent notes, labs, and images personally reviewed by me.     A/P:  Encounter Diagnosis   Name Primary?     Uncontrolled type 1 diabetes mellitus with complication (H) Yes     Comments:  Reviewed health history and diabetes management.  Difficult to assess overall glucose control with limited recent BG test information.    Plan:  Discussed general issues with the diabetes diagnosis and management  We discussed the hgbA1c test which reflects previous overall glucose levels or control  Discussed the importance of blood glucose (BG) testing to assess glucose trends  Provided general overview of the multiple daily injection (MDI) plan using rapid acting mealtime and longacting insulin medications    Recommend:  Continue current Novolog and Levemir MDI insulin plan  Would benefit from learning, using insulin-to-carb counting (I:C) method for mealtime insulin dosing.  Use Novolog 1U per 50 mg/dl > 150 mg/dl correction scale  Will update his DM med Rx's to his pharmacy... [facility] [address]  Increase frequency of BG testing and/or start use of CGMS... Such as Bonds Noam Personal  Recommend an appointment with a Certified Diabetes Educator (CDE) to review these topics, consider [facility] clinic closer to his home.  Advise having fasting lipid panel testing and dilated eye examination, at least annually    Addressed patient questions today    Labs ordered today:   Orders Placed This Encounter   Procedures     DIABETES EDUCATOR REFERRAL     Radiology/Consults ordered today: DIABETES EDUCATOR REFERRAL    More than 50% of the time spent with Mr. Newby on counseling /  coordinating his care.  Total appointment time was 30 minutes.    Follow-up:  3 mo    Óscar Verdugo MD  Endocrinology  [facility]         Again, thank you for allowing me to participate in the care of your patient.        Sincerely,        Óscar Verdugo MD

## 2018-03-20 NOTE — PROGRESS NOTES
Name: Olman Newby is a 32 year old man self-referred for evaluation of diabetes mellitus.  Previously seen by me at my former clinic ([facility]), here to establish care with [facility].    HPI:  Recent issues:  Right hand injury, hand stuck in moving truck door handle 12/2017, swollen and thumb fracture, overnight hospitalization at [facility]  Lost his Verio meter, then replaced with another meter recently  Has job interview at [employer] with overnight stocking job option soon        Previous diagnosis of diabetes mellitus  Details of initial diagnosis, lab testing, and management not available.  Treatment with insulin injections  Using base-dose rapid acting insulin and daily long-acting insulin MDI plan  Infrequent BG testing, chronic higher hgbA1c levels in the 7.5-8.5% range overall  Current DM meds:   Novolog as 9U- Nv10- Nv10    Guestimates Nv sscale   Levemir 14U QHS  Using Verio BG meter, tests 0-3x/day  Previous [facility] labs include:  Lab Results   Component Value Date     01/23/2018    POTASSIUM 4.0 01/23/2018    CHLORIDE 106 01/23/2018    CO2 27 01/23/2018    ANIONGAP 6 01/23/2018    GLC 57 (L) 01/23/2018    BUN 12 01/23/2018    CR 0.73 01/23/2018    GFRESTIMATED >90 01/23/2018    GFRESTBLACK >90 01/23/2018    JOHN 7.8 (L) 01/23/2018     History of substance abuse with alcohol, metamphetamines, reports abstinence of meth x 1 week.  DM Complications:  None known  Details of last eye exam unclear.    Sees Dr. Diane Brice/Ja Osorio for general medicine evaluations.    PMH/PSH:  Past Medical History:   Diagnosis Date     Anxiety      Depression      Hypoglycemia      Substance abuse      Type 1 diabetes (H)      No past surgical history on file.    Family Hx:  No family history on file.      Social Hx:  Social History     Social History     Marital status: Single     Spouse name: N/A     Number of children: N/A     Years of  "education: N/A     Occupational History     Not on file.     Social History Main Topics     Smoking status: Current Every Day Smoker     Packs/day: 0.50     Smokeless tobacco: Never Used     Alcohol use Yes      Comment: occ     Drug use: Yes     Special: Methamphetamines     Sexual activity: Not on file     Other Topics Concern     Not on file     Social History Narrative          MEDICATIONS:  has a current medication list which includes the following prescription(s): blood glucose calibration, onetouch verio iq, insulin detemir, insulin syringe-needle u-100, insulin aspart, insulin glargine, and aspirin.    ROS:     ROS: 10 point ROS neg other than the symptoms noted above in the HPI.    GENERAL: no weight changes, fatigue, fevers, chills, night sweats.   HEENT: no dysphagia, odonophagia, diplopia, neck pain  THYROID:  no apparent hyper or hypothyroid symptoms  CV: no chest pain, pressure, palpitations  LUNGS: no SOB, BRYAN, cough, wheezing   ABDOMEN: no diarrhea, constipation, abdominal pain  EXTREMITIES: no rashes, ulcers, edema  NEUROLOGY: no headaches, denies changes in vision, tingling, extremitiy numbness   MSK: no muscle aches or pains, weakness  SKIN: no rashes or lesions  PSYCH:  stable mood, no significant anxiety or depression  ENDOCRINE: no heat or cold intolerance    Physical Exam   VS: /75 (BP Location: Right arm, Cuff Size: Adult Regular)  Pulse 94  Ht 5' 8\" (1.727 m)  Wt 170 lb (77.1 kg)  BMI 25.85 kg/m2  GENERAL: AXOX3, NAD, slender, well dressed, answering questions appropriately, appears stated age.  THYROID:  normal gland, no apparent nodules or goiter  HEENT: neck non-tender, no exopthalmous, no proptosis, EOMI  CV: RRR, no rubs, gallops, no murmurs  LUNGS: CTAB, no wheezes, rales, or ronchi  ABDOMEN: soft, nontender, nondistended  EXTREMITIES: right hand with abrasions at dorsum metacarpal areas/nuckles; no edema, +pedal pulses  NEUROLOGY: CN grossly intact, no tremors  MSK: grossly " intact  SKIN: scalp balding; no skin rashes or lesions noted    LABS:    All pertinent notes, labs, and images personally reviewed by me.     A/P:  Encounter Diagnosis   Name Primary?     Uncontrolled type 1 diabetes mellitus with complication (H) Yes     Comments:  Reviewed health history and diabetes management.  Difficult to assess overall glucose control with limited recent BG test information.    Plan:  Discussed general issues with the diabetes diagnosis and management  We discussed the hgbA1c test which reflects previous overall glucose levels or control  Discussed the importance of blood glucose (BG) testing to assess glucose trends  Provided general overview of the multiple daily injection (MDI) plan using rapid acting mealtime and longacting insulin medications    Recommend:  Continue current Novolog and Levemir MDI insulin plan  Would benefit from learning, using insulin-to-carb counting (I:C) method for mealtime insulin dosing.  Use Novolog 1U per 50 mg/dl > 150 mg/dl correction scale  Will update his DM med Rx's to his pharmacy... [facility]  Increase frequency of BG testing and/or start use of CGMS... Such as Bonds Noam Personal  Recommend an appointment with a Certified Diabetes Educator (CDE) to review these topics, consider [facility] clinic closer to his home.  Advise having fasting lipid panel testing and dilated eye examination, at least annually    Addressed patient questions today    Labs ordered today:   Orders Placed This Encounter   Procedures     DIABETES EDUCATOR REFERRAL     Radiology/Consults ordered today: DIABETES EDUCATOR REFERRAL    More than 50% of the time spent with Mr. Newby on counseling / coordinating his care.  Total appointment time was 30 minutes.    Follow-up:  3 mo    Óscar Verdugo MD  Endocrinology  [facility]

## 2018-03-20 NOTE — MR AVS SNAPSHOT
After Visit Summary   3/20/2018    Olman Newby    MRN: [id number]           Patient Information     Date Of Birth          1985        Visit Information        Provider Department      3/20/2018 3:00 PM Óscar Verdugo MD [facility]        Today's Diagnoses     Uncontrolled type 1 diabetes mellitus with complication (H)    -  1       Follow-ups after your visit        Additional Services     DIABETES EDUCATOR REFERRAL       DIABETES SELF MANAGEMENT TRAINING (DSMT)      Your provider has referred you to Diabetes Education: FMG: Diabetes Education - All [facility]3] [phone]   [email or web address]   Comments:  He prefers [facility] C[facility] appt.    If an urgent visit is needed or A1C is above 12, Care Team to call the Diabetes  Education Team at [phone] or send an In Basket message to the Diabetes Education Pool (P DIAB ED-PATIENT CARE).    A  will call you to make your appointment. If it has been more than 3 business days since your referral was placed, please call the above phone number to schedule.    Type of training and number of hours: Previous Diagnosis: Follow-up DSMT - 2 hours.    Medicare covers: 10 hours of initial DSMT in 12 month period from the time of first visit, plus 2 hours of follow-up DSMT annually, and additional hours as requested for insulin training.    Diabetes Type: Type 1             Diabetes Co-Morbidities: none               A1C Goal:  <7.0       A1C is: No results found for: A1C    Diabetes Education Topics: Other:   [employer] Freestyle Noam sensor education (place Noam Pro sensor + 1 wk followup visit, consider future Noam Personal Rx)    Special Educational Needs Requiring Individual DSMT: None       MEDICAL NUTRITION THERAPY (MNT) for Diabetes    Medical Nutrition Therapy with a Registered Dietitian can be provided in coordination with Diabetes Self-Management  Training to assist in achieving optimal diabetes management.    MNT Type and Hours: Previous diagnosis: Annual follow-up MNT - 2 hours                       Medicare will cover: 3 hours initial MNT in 12 month period after first visit, plus 2 hours of follow-up MNT annually    Please be aware that coverage of these services is subject to the terms and limitations of your health insurance plan.  Call member services at your health plan to determine Diabetes Self-Management Training (Codes  &amp; ) and Medical Nutrition Therapy (Codes 15165 & 75605) benefits and ask which blood glucose monitor brands are covered by your plan.  Please bring the following with you to your appointment:    (1)  List of current medications   (2)  List of Blood Glucose Monitor brands that are covered by your insurance plan  (3)  Blood Glucose Monitor and log book  (4)   Food records for the 3 days prior to your visit    The Certified Diabetes Educator may make diabetes medication adjustments per the CDE Protocol and Collaborative Practice Agreement.                  Follow-up notes from your care team     Return in about 3 months (around 6/20/2018).      Who to contact     If you have questions or need follow up information about today's clinic visit or your schedule please contact [facility] directly at [phone].  Normal or non-critical lab and imaging results will be communicated to you by MyChart, letter or phone within 4 business days after the clinic has received the results. If you do not hear from us within 7 days, please contact the clinic through MyMusichart or phone. If you have a critical or abnormal lab result, we will notify you by phone as soon as possible.  Submit refill requests through [employer] or call your pharmacy and they will forward the refill request to us. Please allow 3 business days for your refill to be completed.          Additional Information About Your Visit        MyChart Information      "Servoy lets you send messages to your doctor, view your test results, renew your prescriptions, schedule appointments and more. To sign up, go to [email or web address] . Click on \"Log in\" on the left side of the screen, which will take you to the Welcome page. Then click on \"Sign up Now\" on the right side of the page.     You will be asked to enter the access code listed below, as well as some personal information. Please follow the directions to create your username and password.     Your access code is: [email or web address]  Expires: 2018  1:54 AM     Your access code will  in 90 days. If you need help or a new code, please call your [place] clinic or [phone].        Care EveryWhere ID     This is your [facility] EveryWhere ID. This could be used by other organizations to access your [place] medical records  [id number]        Your Vitals Were     Pulse Height BMI (Body Mass Index)             94 5' 8\" (1.727 m) 25.85 kg/m2          Blood Pressure from Last 3 Encounters:   18 123/75   18 (!) 130/95   10/09/17 112/70    Weight from Last 3 Encounters:   18 170 lb (77.1 kg)   18 170 lb (77.1 kg)   03/23/15 165 lb (74.8 kg)              We Performed the Following     DIABETES EDUCATOR REFERRAL        Primary Care Provider Office Phone # Fax #    Diane Niecy Brice [phone]       [facility] [address] [place] [address]        Equal Access to Services     [facility]: Hadii aad ku hadasho Soomaali, waaxda luqadaha, qaybta kaalmada adeegyaiveth, boogie horton. So [facility] [phone].    ATENCIÓN: Si habla español, tiene a vora disposición servicios gratuitos de asistencia lingüística. Felicia al [phone].    We comply with applicable federal civil rights laws and [place] laws. We do not discriminate on the basis of race, color, national origin, age, disability, sex, sexual orientation, or gender identity.            Thank you!     " "Thank you for choosing [facility]  for your care. Our goal is always to provide you with excellent care. Hearing back from our patients is one way we can continue to improve our services. Please take a few minutes to complete the written survey that you may receive in the mail after your visit with us. Thank you!             Your Updated Medication List - Protect others around you: Learn how to safely use, store and throw away your medicines at www.disposemymeds.org.          This list is accurate as of 3/20/18  6:55 PM.  Always use your most recent med list.                   Brand Name Dispense Instructions for use Diagnosis    aspirin 81 MG tablet      Take 1 tablet by mouth daily.        blood glucose calibration solution      UTD        insulin detemir 100 UNIT/ML injection    LEVEMIR     Inject 14 Units Subcutaneous        insulin syringe-needle U-100 30G X 1/2\" 0.5 ML    B-D INSULIN SYRINGE    150 each    Use 4 insulin syringes daily or as directed.    Type 1 diabetes mellitus without complication (H)       LANTUS SC      Inject  Subcutaneous daily.        NOVOLOG SC      Inject  Subcutaneous daily.        ONETOUCH VERIO IQ test strip   Generic drug:  blood glucose monitoring      TEST 4 TIMES D.          "

## 2018-03-21 ENCOUNTER — TELEPHONE (OUTPATIENT)
Dept: ENDOCRINOLOGY | Facility: CLINIC | Age: 33
End: 2018-03-21

## 2018-03-21 DIAGNOSIS — E10.9 TYPE 1 DIABETES MELLITUS WITHOUT COMPLICATION (H): Primary | ICD-10-CM

## 2018-03-21 RX ORDER — INSULIN GLARGINE 100 [IU]/ML
14 INJECTION, SOLUTION SUBCUTANEOUS AT BEDTIME
Qty: 10 ML | Refills: 1 | Status: SHIPPED | OUTPATIENT
Start: 2018-03-21 | End: 2019-06-03

## 2018-03-22 NOTE — TELEPHONE ENCOUNTER
Message noted.  I have now sent a Lantus vial Rx as the alternative formulary option (rather than Levemir vial).     ELMIRA Verdugo MD  [facility]

## 2018-03-22 NOTE — TELEPHONE ENCOUNTER
Fax from [facility] pharmacy reads: Drug not covered by patient plan. The preferred alternative is Lantus. Please call/fax the pharmacy to change medication along with strength, directions, quantity, and refills.      [facility]:   Tel: [phone]   Fax: [phone]      insulin detemir (LEVEMIR) 100 UNIT/ML injection  Last Written Prescription Date:  3/20/18  Last Fill Quantity: 10 mL,  # refills: 11   Last office visit: 3/20/2018 with prescribing provider:  Lucina   Future Office Visit:          Elena MAYER(R)

## 2018-06-20 ENCOUNTER — TELEPHONE (OUTPATIENT)
Dept: ENDOCRINOLOGY | Facility: CLINIC | Age: 33
End: 2018-06-20

## 2018-06-20 DIAGNOSIS — E10.9 TYPE 1 DIABETES MELLITUS WITHOUT COMPLICATION (H): ICD-10-CM

## 2018-06-20 RX ORDER — SYRINGE-NEEDLE,INSULIN,0.5 ML 27GX1/2"
SYRINGE, EMPTY DISPOSABLE MISCELLANEOUS
Qty: 150 EACH | Refills: 11 | Status: SHIPPED | OUTPATIENT
Start: 2018-06-20 | End: 2022-09-07

## 2018-06-20 NOTE — TELEPHONE ENCOUNTER
"Reason for Call:  Medication or medication refill:    Do you use a [facility]?  Name of the pharmacy and phone number for the current request:    [facility] [address] [place] [address] [address] [place] [address] & [facility]    Name of the medication requested:    insulin syringe-needle U-100 (B-D INSULIN SYRINGE) 30G X 1/2\" 0.5  each 11 3/2/2018  --   Sig: Use 4 insulin syringes daily or as directed.   Class: E-Prescribe   Order: [id number]   E-Prescribing Status: Receipt confirmed by pharmacy (3/2/2018  8:08 PM CST)       Other request: pt prefers 31 gauge 0.5mL    Can we leave a detailed message on this number? YES    Phone number pharmacy can be reached at: [phone]  Cleve is pharmacist    Best Time: any    Call taken on 6/20/2018 at 10:15 AM by Kristin Earl      "

## 2018-06-20 NOTE — TELEPHONE ENCOUNTER
TL, looks like the pt perfers a different size syringe please see comment below..      Catherine Veronica

## 2018-06-20 NOTE — TELEPHONE ENCOUNTER
Message noted.  I changed the Rx for the insulin syringes, as requested, and resent Rx to pharmacy.    ELMIRA Verdugo MD  Endocrinology  [facility]

## 2018-07-27 ENCOUNTER — HOSPITAL ENCOUNTER (OUTPATIENT)
Facility: CLINIC | Age: 33
Setting detail: OBSERVATION
LOS: 1 days | Discharge: HOME | End: 2018-07-28
Attending: INTERNAL MEDICINE | Admitting: INTERNAL MEDICINE
Payer: COMMERCIAL

## 2018-07-27 ENCOUNTER — TELEPHONE (OUTPATIENT)
Dept: ENDOCRINOLOGY | Facility: CLINIC | Age: 33
End: 2018-07-27

## 2018-07-27 ENCOUNTER — TRANSFERRED RECORDS (OUTPATIENT)
Dept: HEALTH INFORMATION MANAGEMENT | Facility: CLINIC | Age: 33
End: 2018-07-27

## 2018-07-27 DIAGNOSIS — E10.9 TYPE 1 DIABETES MELLITUS WITHOUT COMPLICATION (H): Primary | ICD-10-CM

## 2018-07-27 DIAGNOSIS — E10.9 TYPE 1 DIABETES MELLITUS WITHOUT COMPLICATION (H): ICD-10-CM

## 2018-07-27 LAB
ALT SERPL-CCNC: 47 U/L (ref 14–63)
AST SERPL-CCNC: 27 U/L (ref 15–37)
CREAT SERPL-MCNC: 0.92 MG/DL (ref 0.67–1.17)
GFR SERPL CREATININE-BSD FRML MDRD: >60 ML/MIN/1.73M2 (ref 60–150)
GLUCOSE BLDC GLUCOMTR-MCNC: 251 MG/DL (ref 70–99)
GLUCOSE SERPL-MCNC: 527 MG/DL (ref 74–100)
POTASSIUM SERPL-SCNC: 5.1 MMOL/L (ref 3.5–5.1)

## 2018-07-27 PROCEDURE — 00000146 ZZHCL STATISTIC GLUCOSE BY METER IP

## 2018-07-27 PROCEDURE — 25000128 H RX IP 250 OP 636: Performed by: INTERNAL MEDICINE

## 2018-07-27 PROCEDURE — G0378 HOSPITAL OBSERVATION PER HR: HCPCS

## 2018-07-27 PROCEDURE — 99220 ZZC INITIAL OBSERVATION CARE,LEVL III: CPT | Performed by: INTERNAL MEDICINE

## 2018-07-27 RX ORDER — POTASSIUM CHLORIDE 1.5 G/1.58G
20-40 POWDER, FOR SOLUTION ORAL
Status: DISCONTINUED | OUTPATIENT
Start: 2018-07-27 | End: 2018-07-28 | Stop reason: HOSPADM

## 2018-07-27 RX ORDER — OXYCODONE HYDROCHLORIDE 5 MG/1
5-10 TABLET ORAL
Status: DISCONTINUED | OUTPATIENT
Start: 2018-07-27 | End: 2018-07-28 | Stop reason: HOSPADM

## 2018-07-27 RX ORDER — POTASSIUM CHLORIDE 1500 MG/1
20-40 TABLET, EXTENDED RELEASE ORAL
Status: DISCONTINUED | OUTPATIENT
Start: 2018-07-27 | End: 2018-07-28 | Stop reason: HOSPADM

## 2018-07-27 RX ORDER — AMOXICILLIN 250 MG
2 CAPSULE ORAL 2 TIMES DAILY PRN
Status: DISCONTINUED | OUTPATIENT
Start: 2018-07-27 | End: 2018-07-28 | Stop reason: HOSPADM

## 2018-07-27 RX ORDER — LORAZEPAM 0.5 MG/1
.5-1 TABLET ORAL EVERY 4 HOURS PRN
Status: DISCONTINUED | OUTPATIENT
Start: 2018-07-27 | End: 2018-07-28 | Stop reason: HOSPADM

## 2018-07-27 RX ORDER — MAGNESIUM SULFATE HEPTAHYDRATE 40 MG/ML
4 INJECTION, SOLUTION INTRAVENOUS EVERY 4 HOURS PRN
Status: DISCONTINUED | OUTPATIENT
Start: 2018-07-27 | End: 2018-07-28 | Stop reason: HOSPADM

## 2018-07-27 RX ORDER — DEXTROSE MONOHYDRATE 25 G/50ML
25-50 INJECTION, SOLUTION INTRAVENOUS
Status: DISCONTINUED | OUTPATIENT
Start: 2018-07-27 | End: 2018-07-28 | Stop reason: HOSPADM

## 2018-07-27 RX ORDER — NICOTINE POLACRILEX 4 MG
15-30 LOZENGE BUCCAL
Status: DISCONTINUED | OUTPATIENT
Start: 2018-07-27 | End: 2018-07-28 | Stop reason: HOSPADM

## 2018-07-27 RX ORDER — POTASSIUM CL/LIDO/0.9 % NACL 10MEQ/0.1L
10 INTRAVENOUS SOLUTION, PIGGYBACK (ML) INTRAVENOUS
Status: DISCONTINUED | OUTPATIENT
Start: 2018-07-27 | End: 2018-07-28 | Stop reason: HOSPADM

## 2018-07-27 RX ORDER — POLYETHYLENE GLYCOL 3350 17 G/17G
17 POWDER, FOR SOLUTION ORAL DAILY PRN
Status: DISCONTINUED | OUTPATIENT
Start: 2018-07-27 | End: 2018-07-28 | Stop reason: HOSPADM

## 2018-07-27 RX ORDER — DEXTROSE MONOHYDRATE 25 G/50ML
25-50 INJECTION, SOLUTION INTRAVENOUS
Status: DISCONTINUED | OUTPATIENT
Start: 2018-07-27 | End: 2018-07-27

## 2018-07-27 RX ORDER — ONDANSETRON 4 MG/1
4 TABLET, ORALLY DISINTEGRATING ORAL EVERY 6 HOURS PRN
Status: DISCONTINUED | OUTPATIENT
Start: 2018-07-27 | End: 2018-07-28 | Stop reason: HOSPADM

## 2018-07-27 RX ORDER — PROCHLORPERAZINE 25 MG
25 SUPPOSITORY, RECTAL RECTAL EVERY 12 HOURS PRN
Status: DISCONTINUED | OUTPATIENT
Start: 2018-07-27 | End: 2018-07-28 | Stop reason: HOSPADM

## 2018-07-27 RX ORDER — NITROGLYCERIN 0.4 MG/1
0.4 TABLET SUBLINGUAL EVERY 5 MIN PRN
Status: DISCONTINUED | OUTPATIENT
Start: 2018-07-27 | End: 2018-07-28 | Stop reason: HOSPADM

## 2018-07-27 RX ORDER — ONDANSETRON 2 MG/ML
4 INJECTION INTRAMUSCULAR; INTRAVENOUS EVERY 6 HOURS PRN
Status: DISCONTINUED | OUTPATIENT
Start: 2018-07-27 | End: 2018-07-28 | Stop reason: HOSPADM

## 2018-07-27 RX ORDER — SODIUM CHLORIDE 9 MG/ML
INJECTION, SOLUTION INTRAVENOUS CONTINUOUS
Status: DISCONTINUED | OUTPATIENT
Start: 2018-07-27 | End: 2018-07-28 | Stop reason: HOSPADM

## 2018-07-27 RX ORDER — ACETAMINOPHEN 650 MG/1
650 SUPPOSITORY RECTAL EVERY 4 HOURS PRN
Status: DISCONTINUED | OUTPATIENT
Start: 2018-07-27 | End: 2018-07-28 | Stop reason: HOSPADM

## 2018-07-27 RX ORDER — ASPIRIN 81 MG/1
81 TABLET ORAL DAILY
Status: DISCONTINUED | OUTPATIENT
Start: 2018-07-28 | End: 2018-07-28 | Stop reason: HOSPADM

## 2018-07-27 RX ORDER — ACETAMINOPHEN 325 MG/1
650 TABLET ORAL EVERY 4 HOURS PRN
Status: DISCONTINUED | OUTPATIENT
Start: 2018-07-27 | End: 2018-07-28 | Stop reason: HOSPADM

## 2018-07-27 RX ORDER — LIDOCAINE 40 MG/G
CREAM TOPICAL
Status: DISCONTINUED | OUTPATIENT
Start: 2018-07-27 | End: 2018-07-28 | Stop reason: HOSPADM

## 2018-07-27 RX ORDER — POTASSIUM CHLORIDE 29.8 MG/ML
20 INJECTION INTRAVENOUS
Status: DISCONTINUED | OUTPATIENT
Start: 2018-07-27 | End: 2018-07-28 | Stop reason: HOSPADM

## 2018-07-27 RX ORDER — POTASSIUM CHLORIDE 7.45 MG/ML
10 INJECTION INTRAVENOUS
Status: DISCONTINUED | OUTPATIENT
Start: 2018-07-27 | End: 2018-07-28 | Stop reason: HOSPADM

## 2018-07-27 RX ORDER — AMOXICILLIN 250 MG
1 CAPSULE ORAL 2 TIMES DAILY PRN
Status: DISCONTINUED | OUTPATIENT
Start: 2018-07-27 | End: 2018-07-28 | Stop reason: HOSPADM

## 2018-07-27 RX ORDER — NICOTINE POLACRILEX 4 MG
15-30 LOZENGE BUCCAL
Status: DISCONTINUED | OUTPATIENT
Start: 2018-07-27 | End: 2018-07-27

## 2018-07-27 RX ORDER — NALOXONE HYDROCHLORIDE 0.4 MG/ML
.1-.4 INJECTION, SOLUTION INTRAMUSCULAR; INTRAVENOUS; SUBCUTANEOUS
Status: DISCONTINUED | OUTPATIENT
Start: 2018-07-27 | End: 2018-07-28 | Stop reason: HOSPADM

## 2018-07-27 RX ORDER — PROCHLORPERAZINE MALEATE 5 MG
10 TABLET ORAL EVERY 6 HOURS PRN
Status: DISCONTINUED | OUTPATIENT
Start: 2018-07-27 | End: 2018-07-28 | Stop reason: HOSPADM

## 2018-07-27 RX ADMIN — SODIUM CHLORIDE 1000 ML: 9 INJECTION, SOLUTION INTRAVENOUS at 23:31

## 2018-07-27 RX ADMIN — SODIUM CHLORIDE: 9 INJECTION, SOLUTION INTRAVENOUS at 23:30

## 2018-07-27 NOTE — IP AVS SNAPSHOT
MRN:[id number]                      After Visit Summary   7/27/2018    Olman Newby    MRN: [id number]           Thank you!     Thank you for choosing [facility] for your care. Our goal is always to provide you with excellent care. Hearing back from our patients is one way we can continue to improve our services. Please take a few minutes to complete the written survey that you may receive in the mail after you visit with us. Thank you!        Patient Information     Date Of Birth          1985        Designated Caregiver       Most Recent Value    Caregiver    Name of designated caregiver -- [self]    Phone number of caregiver .      About your hospital stay     You were admitted on:  July 27, 2018 You last received care in the:  [facility] Coron[facility] Care Unit    You were discharged on:  July 28, 2018        Reason for your hospital stay       DKA associated with running out of Novolog insulin, and methamphetamie abuse                  Who to Call     For medical emergencies, please call 911.  For non-urgent questions about your medical care, please call your primary care provider or clinic, [phone]          Attending Provider     Provider Specialty    Mario Barclay MD Internal Medicine       Primary Care Provider Office Phone # Fax #    Diane Martinez [phone]      After Care Instructions     Activity       Your activity upon discharge: activity as tolerated            Diet       Follow this diet upon discharge: Orders Placed This Encounter      Combination Diet Regular Diet Adult; 2870-3477 Calories: Moderate Consistent CHO (4-6 CHO units/meal)            Discharge Instructions       Call Dr. Garrison at Pager [phone] if questions, or Cell Phone [phone].                  Follow-up Appointments     Follow-up and recommended labs and tests        Follow up with primary care provider, DIANE MARTINEZ, in 5 to  7 days for hospital  "follow- up.  No follow up labs or test are needed.                  Your next 10 appointments already scheduled     Oct 23, 2018  3:30 PM CDT   Return Visit with Óscar Verdugo MD   [facility]    [address] [place] [id number] [phone]              Pending Results     Date and Time Order Name Status Description    2018 2256 EKG 12-lead, tracing only - DKA Preliminary             Statement of Approval     Ordered          18 0925  I have reviewed and agree with all the recommendations and orders detailed in this document.  EFFECTIVE NOW     Approved and electronically signed by:  Primitivo Garrison MD             Admission Information     Date & Time Provider Department Dept. Phone    2018 Mario Barclay MD [facility] Coron[facility] Care Unit [phone]      Your Vitals Were     Blood Pressure Temperature Respirations Height Pulse Oximetry       116/75 98.9  F (37.2  C) (Oral) 16 1.727 m (5' 8\") 98%       MyChart Information     [employer] lets you send messages to your doctor, view your test results, renew your prescriptions, schedule appointments and more. To sign up, go to [email or web address] . Click on \"Log in\" on the left side of the screen, which will take you to the Welcome page. Then click on \"Sign up Now\" on the right side of the page.     You will be asked to enter the access code listed below, as well as some personal information. Please follow the directions to create your username and password.     Your access code is: [email or web address]  Expires: 10/26/2018 10:47 AM     Your access code will  in 90 days. If you need help or a new code, please call your [facility] or [phone].        Care EveryWhere ID     This is your Care EveryWhere ID. This could be used by other organizations to access your [facility] medical records  [id number]        Equal Access to Services     MARLY SCANLON AH: Hadii aad " zeke Macdonald, wawillda ludialloadaha, qafransiscata kashirin gold, boogie aquilesin hayaanaheed wayreyes kimberlysalome labouchranaheed sol. So [facility] [phone].    ATENCIÓN: Si habla ana, tiene a vora disposición servicios gratuitos de asistencia lingüística. Felicia al [phone].    We comply with applicable federal civil rights laws and [place] laws. We do not discriminate on the basis of race, color, national origin, age, disability, sex, sexual orientation, or gender identity.               Review of your medicines      CONTINUE these medicines which may have CHANGED, or have new prescriptions. If we are uncertain of the size of tablets/capsules you have at home, strength may be listed as something that might have changed.        Dose / Directions    * insulin aspart 100 UNITS/ML injection   Commonly known as:  NovoLOG VIAL   This may have changed:    - how much to take  - how to take this  - when to take this  - additional instructions   Notes to Patient:  As needed        Inject 4-10 units sq at mealtime prn   Quantity:  10 mL   Refills:  11       * insulin aspart 100 UNITS/ML injection   Commonly known as:  NovoLOG VIAL   This may have changed:    - medication strength  - how much to take  - when to take this  - additional instructions   Notes to Patient:  With meals        10 units tid with meals.   Quantity:  1 vial   Refills:  3       * Notice:  This list has 2 medication(s) that are the same as other medications prescribed for you. Read the directions carefully, and ask your doctor or other care provider to review them with you.      CONTINUE these medicines which have NOT CHANGED        Dose / Directions    aspirin 81 MG tablet        Dose:  1 tablet   Take 1 tablet by mouth daily.   Refills:  0       blood glucose calibration solution        UTD   Refills:  0       insulin glargine 100 UNIT/ML injection   Commonly known as:  LANTUS VIAL        Dose:  14 Units   Inject 14 Units Subcutaneous At Bedtime   Quantity:  10 mL  "  Refills:  1       insulin syringe-needle U-100 31G X 5/16\" 0.5 ML   Commonly known as:  BD insulin syringe ultrafine        Use one syringe 1-2 daily or as directed.   Quantity:  150 each   Refills:  11       ONETOUCH VERIO IQ test strip   Generic drug:  blood glucose monitoring        Use to test blood sugar 3 times daily or as directed.   Quantity:  100 strip   Refills:  11            Where to get your medicines      These medications were sent to [facility] [place], [place] - [address]  [address], [place] [address]     Phone:  [phone]     insulin aspart 100 UNITS/ML injection                Protect others around you: Learn how to safely use, store and throw away your medicines at www.disposemymeds.org.             Medication List: This is a list of all your medications and when to take them. Check marks below indicate your daily home schedule. Keep this list as a reference.      Medications           Morning Afternoon Evening Bedtime As Needed    aspirin 81 MG tablet   Take 1 tablet by mouth daily.   Next Dose Due:  7/28                                   blood glucose calibration solution   UTD                                * insulin aspart 100 UNITS/ML injection   Commonly known as:  NovoLOG VIAL   Inject 4-10 units sq at mealtime prn   Notes to Patient:  As needed                                         * insulin aspart 100 UNITS/ML injection   Commonly known as:  NovoLOG VIAL   10 units tid with meals.   Notes to Patient:  With meals                                         insulin glargine 100 UNIT/ML injection   Commonly known as:  LANTUS VIAL   Inject 14 Units Subcutaneous At Bedtime   Last time this was given:  14 Units on 7/28/2018 12:04 AM   Next Dose Due:  Tonight                                   insulin syringe-needle U-100 31G X 5/16\" 0.5 ML   Commonly known as:  BD insulin syringe ultrafine   Use one syringe 1-2 daily or as directed.                          "       ONETOUCH VERIO IQ test strip   Use to test blood sugar 3 times daily or as directed.   Generic drug:  blood glucose monitoring                                * Notice:  This list has 2 medication(s) that are the same as other medications prescribed for you. Read the directions carefully, and ask your doctor or other care provider to review them with you.

## 2018-07-27 NOTE — TELEPHONE ENCOUNTER
Reason for Call:  Medication or medication refill:    Do you use a [facility]?  Name of the pharmacy and phone number for the current request:  [facility] [id number] [facility] [address] AT [place] [address] [address]      Name of the medication requested: Rx should be written for 10 units is base line  Every 50 ( blood sugar) pt takes extra unit     insulin aspart (NOVOLOG VIAL) 100 UNITS/ML injection    Other request: any    Can we leave a detailed message on this number? YES    Phone number patient can be reached at: Home number on file [phone] (home)    Best Time: any    Call taken on 7/27/2018 at 12:39 PM by Brenda Leyva

## 2018-07-27 NOTE — IP AVS SNAPSHOT
[facility]    [address]    [facility] [address]    Phone:  [phone]                                       After Visit Summary   7/27/2018    Olman Newby    MRN: [id number]           After Visit Summary Signature Page     I have received my discharge instructions, and my questions have been answered. I have discussed any challenges I see with this plan with the nurse or doctor.    ..........................................................................................................................................  Patient/Patient Representative Signature      ..........................................................................................................................................  Patient Representative Print Name and Relationship to Patient    ..................................................               ................................................  Date                                            Time    ..........................................................................................................................................  Reviewed by Signature/Title    ...................................................              ..............................................  Date                                                            Time

## 2018-07-27 NOTE — TELEPHONE ENCOUNTER
Message noted.  This patient does have an active Novolog vial Rx at this pharmacy, with refills.  I decided to reorder the Rx today however, Rx sent.    ELMIRA Vedrugo MD  Endocrinology  [facility]

## 2018-07-28 VITALS
HEIGHT: 68 IN | DIASTOLIC BLOOD PRESSURE: 75 MMHG | OXYGEN SATURATION: 98 % | RESPIRATION RATE: 16 BRPM | SYSTOLIC BLOOD PRESSURE: 116 MMHG | TEMPERATURE: 98.4 F

## 2018-07-28 PROBLEM — E87.1 HYPONATREMIA: Status: ACTIVE | Noted: 2018-07-28

## 2018-07-28 LAB
ALBUMIN SERPL-MCNC: 3 G/DL (ref 3.4–5)
ALP SERPL-CCNC: 98 U/L (ref 40–150)
ALT SERPL W P-5'-P-CCNC: 40 U/L (ref 0–70)
ANION GAP SERPL CALCULATED.3IONS-SCNC: 10 MMOL/L (ref 3–14)
AST SERPL W P-5'-P-CCNC: 23 U/L (ref 0–45)
BILIRUB DIRECT SERPL-MCNC: 0.2 MG/DL (ref 0–0.2)
BILIRUB SERPL-MCNC: 0.8 MG/DL (ref 0.2–1.3)
BUN SERPL-MCNC: 15 MG/DL (ref 7–30)
CALCIUM SERPL-MCNC: 7.3 MG/DL (ref 8.5–10.1)
CHLORIDE SERPL-SCNC: 107 MMOL/L (ref 94–109)
CO2 SERPL-SCNC: 20 MMOL/L (ref 20–32)
CREAT SERPL-MCNC: 0.69 MG/DL (ref 0.66–1.25)
GFR SERPL CREATININE-BSD FRML MDRD: >90 ML/MIN/1.7M2
GLUCOSE BLDC GLUCOMTR-MCNC: 192 MG/DL (ref 70–99)
GLUCOSE BLDC GLUCOMTR-MCNC: 215 MG/DL (ref 70–99)
GLUCOSE SERPL-MCNC: 223 MG/DL (ref 70–99)
KETONES BLD-SCNC: 1.7 MMOL/L (ref 0–0.6)
LACTATE BLD-SCNC: 1.1 MMOL/L (ref 0.7–2)
MAGNESIUM SERPL-MCNC: 1.8 MG/DL (ref 1.6–2.3)
PHOSPHATE SERPL-MCNC: 3.1 MG/DL (ref 2.5–4.5)
POTASSIUM SERPL-SCNC: 4.2 MMOL/L (ref 3.4–5.3)
PROT SERPL-MCNC: 5.6 G/DL (ref 6.8–8.8)
SODIUM SERPL-SCNC: 137 MMOL/L (ref 133–144)

## 2018-07-28 PROCEDURE — 82010 KETONE BODYS QUAN: CPT | Performed by: INTERNAL MEDICINE

## 2018-07-28 PROCEDURE — G0378 HOSPITAL OBSERVATION PER HR: HCPCS

## 2018-07-28 PROCEDURE — 00000146 ZZHCL STATISTIC GLUCOSE BY METER IP

## 2018-07-28 PROCEDURE — 83605 ASSAY OF LACTIC ACID: CPT | Performed by: INTERNAL MEDICINE

## 2018-07-28 PROCEDURE — 93005 ELECTROCARDIOGRAM TRACING: CPT

## 2018-07-28 PROCEDURE — 80048 BASIC METABOLIC PNL TOTAL CA: CPT | Performed by: INTERNAL MEDICINE

## 2018-07-28 PROCEDURE — 99217 ZZC OBSERVATION CARE DISCHARGE: CPT | Performed by: INTERNAL MEDICINE

## 2018-07-28 PROCEDURE — 83735 ASSAY OF MAGNESIUM: CPT | Performed by: INTERNAL MEDICINE

## 2018-07-28 PROCEDURE — 25000132 ZZH RX MED GY IP 250 OP 250 PS 637: Performed by: INTERNAL MEDICINE

## 2018-07-28 PROCEDURE — 93010 ELECTROCARDIOGRAM REPORT: CPT | Performed by: INTERNAL MEDICINE

## 2018-07-28 PROCEDURE — 84100 ASSAY OF PHOSPHORUS: CPT | Performed by: INTERNAL MEDICINE

## 2018-07-28 PROCEDURE — 36415 COLL VENOUS BLD VENIPUNCTURE: CPT | Performed by: INTERNAL MEDICINE

## 2018-07-28 PROCEDURE — 80076 HEPATIC FUNCTION PANEL: CPT | Performed by: INTERNAL MEDICINE

## 2018-07-28 PROCEDURE — 96372 THER/PROPH/DIAG INJ SC/IM: CPT

## 2018-07-28 PROCEDURE — 25000131 ZZH RX MED GY IP 250 OP 636 PS 637: Performed by: INTERNAL MEDICINE

## 2018-07-28 PROCEDURE — 25000128 H RX IP 250 OP 636: Performed by: INTERNAL MEDICINE

## 2018-07-28 RX ADMIN — INSULIN ASPART 3 UNITS: 100 INJECTION, SOLUTION INTRAVENOUS; SUBCUTANEOUS at 00:05

## 2018-07-28 RX ADMIN — NICOTINE POLACRILEX 2 MG: 2 GUM, CHEWING ORAL at 11:23

## 2018-07-28 RX ADMIN — INSULIN ASPART 10 UNITS: 100 INJECTION, SOLUTION INTRAVENOUS; SUBCUTANEOUS at 08:19

## 2018-07-28 RX ADMIN — NICOTINE POLACRILEX 2 MG: 2 GUM, CHEWING ORAL at 08:27

## 2018-07-28 RX ADMIN — ASPIRIN 81 MG: 81 TABLET, COATED ORAL at 08:22

## 2018-07-28 RX ADMIN — SODIUM CHLORIDE: 9 INJECTION, SOLUTION INTRAVENOUS at 01:36

## 2018-07-28 RX ADMIN — INSULIN GLARGINE 14 UNITS: 100 INJECTION, SOLUTION SUBCUTANEOUS at 00:04

## 2018-07-28 RX ADMIN — NICOTINE POLACRILEX 2 MG: 2 GUM, CHEWING ORAL at 00:35

## 2018-07-28 NOTE — PHARMACY-ADMISSION MEDICATION HISTORY
"Admission medication history interview status for the 7/27/2018  admission is complete. See EPIC admission navigator for prior to admission medications     Medication history source reliability:Good    Actions taken by pharmacist (provider contacted, etc):interviewed patient     Additional medication history information not noted on PTA med list :None    Medication reconciliation/reorder completed by provider prior to medication history? No    Time spent in this activity: 5 minutes    Prior to Admission medications    Medication Sig Last Dose Taking? Auth Provider   aspirin 81 MG tablet Take 1 tablet by mouth daily. 7/27/2018 at am Yes Reported, Patient   Insulin Aspart (NOVOLOG SC) Inject 10 Units Subcutaneous 3 times daily (with meals) 7/27/2018 at Unknown time Yes Unknown, Entered By History   insulin aspart (NOVOLOG VIAL) 100 UNITS/ML injection Inject 4-10 units sq at mealtime prn  Patient taking differently: Inject 0-5 Units Subcutaneous 3 times daily (with meals) Inject 1 unit for blood sugar every 50 over 100 (1 unit if -199, 2 units for -249, 3 units for -299, etc.) 7/27/2018 at Unknown time Yes Óscar Verdugo MD   insulin glargine (LANTUS VIAL) 100 UNIT/ML injection Inject 14 Units Subcutaneous At Bedtime 7/27/2018 at pm Yes Óscar Verdugo MD   blood glucose calibration (ONETOUCH VERIO) solution UTD   Reported, Patient   insulin syringe-needle U-100 (BD INSULIN SYRINGE ULTRAFINE) 31G X 5/16\" 0.5 ML Use one syringe 1-2 daily or as directed.   Óscar Verdugo MD   ONETOUCH VERIO IQ test strip Use to test blood sugar 3 times daily or as directed.   Óscar Verdugo MD         "

## 2018-07-28 NOTE — H&P
Admitted:     07/27/2018      DATE OF ADMISSION: 07/27/2018      PRIMARY CARE PHYSICIAN:  Diane Brice MD of [facility] in [place].      PRIMARY ENDOCRINOLOGIST: Óscar Verdugo MD      CHIEF COMPLAINT:  Nausea, epigastric pain.      HISTORY OF PRESENT ILLNESS:  Mr. Olman Newby is a delightful 32-year-old  gentleman with a past medical history notable for diabetes mellitus type 1, substance abuse, depression/anxiety, and alcohol dependence who originally presented to the [place] [address] Emergency Department earlier for NovoLog refill as he had run out of his medication since last night.  His blood glucose was 420 and therefore he was prescribed a prescription for NovoLog.  He states that he went to [facility]. After he left the place, he felt nausea and had severe epigastric abdominal pain, which was on and off and associated with vomiting.  This prompted the patient to revisit the Emergency Department at [place] [address].  The workup revealed a blood glucose of 527.  Sodium level was low at 125.  Potassium was 5.1.  BUN was 22, creatinine 0.92.  The venous blood gas showed pH of 7.34, pCO2 of 32, pO2 of 67 and bicarbonate of 17.  Anion gap was13.  Urine was positive for glucose and ketones.  The patient admitted to having used crystal meth the day before and his U-tox was positive for methamphetamine, but negative for other drugs.  The hematology profile showed a white count of 7.9 with 79% neutrophils, and normal hemoglobin of 15 and normal platelet count of 224,000.  The patient was in sinus tachycardia with a heart rate of 105.  Lipase was normal at 81.  Given concern for possible DKA, the patient has been transferred over to this facility.      At the time of interview, the patient reports no nausea, vomiting or abdominal pain.  He is somewhat anxious and wants to be discharged.  I explained to the patient that he needs to stay overnight to get his blood glucose under control.  Notably, he takes  NovoLog 10 units subcutaneous t.i.d. with meals, with extra 1 unit for blood glucose every 50 avmr092, as well as Lantus 14 units subcu at bedtime.      He is admitted under observation status to [facility].      PAST MEDICAL HISTORY:   1.  Diabetes mellitus type 1, diagnosed in first grade.  He is under the care of Dr. Verdugo.  The most recent hemoglobin A1c was 7.6% on 05/20/2018.   2.  Substance abuse (methamphetamine).   3.  Depression/anxiety.   4.  History of alcoholism, but sober for 2 years.   5.  Tobacco abuse.      PAST SURGICAL HISTORY:  None.      FAMILY HISTORY:  Both parents are healthy. Family history is positive for diabetes mellitus type 2 in his paternal grandmother.      SOCIAL HISTORY:  The patient is single.  He started smoking at the age of 13.  He currently smokes 1 pack a day.  He used to have alcohol dependence, but has been sober for 2 years.  He smokes crystal meth twice a week.      MEDICATIONS:   Prior to Admission Medications   Prescriptions Last Dose Informant Patient Reported? Taking?   Insulin Aspart (NOVOLOG SC) 7/27/2018 at Unknown time Self Yes Yes   Sig: Inject 10 Units Subcutaneous 3 times daily (with meals)   ONETOUCH VERIO IQ test strip  Self No No   Sig: Use to test blood sugar 3 times daily or as directed.   aspirin 81 MG tablet 7/27/2018 at am Self Yes Yes   Sig: Take 1 tablet by mouth daily.   blood glucose calibration (ONETOUCH VERIO) solution  Self Yes No   Sig: UTD   insulin aspart (NOVOLOG VIAL) 100 UNITS/ML injection 7/27/2018 at Unknown time Self No Yes   Sig: Inject 4-10 units sq at mealtime prn   Patient taking differently: Inject 0-5 Units Subcutaneous 3 times daily (with meals) Inject 1 unit for blood sugar every 50 over 100 (1 unit if -199, 2 units for -249, 3 units for -299, etc.)   insulin glargine (LANTUS VIAL) 100 UNIT/ML injection 7/27/2018 at pm Self No Yes   Sig: Inject 14 Units Subcutaneous At Bedtime   insulin syringe-needle U-100 (BD  "INSULIN SYRINGE ULTRAFINE) 31G X 5/16\" 0.5 ML  Self No No   Sig: Use one syringe 1-2 daily or as directed.      Facility-Administered Medications: None      ALLERGIES: SULFA DRUGS.      REVIEW OF SYSTEMS:  A 10-point review of systems was performed thoroughly and was negative except as stated in history of present illness.  The patient reports no chest pain, dyspnea, diarrhea or urinary symptoms.      PHYSICAL EXAMINATION:   GENERAL:  This patient is a very pleasant, but anxious-appearing young gentleman who is in no acute distress.   VITAL SIGNS:  Blood pressure 108/71, heart rate 108, respiratory rate 20, temperature not documented, oxygen saturation 98% on room air.   HEENT:  The pupils are round, equal, reactive to light bilaterally.  There is no conjunctival pallor or scleral icterus.  The oral mucosa appears moist.   NECK:  Supple.  There is no elevated JVP.   LUNGS:  Clear to auscultation bilaterally.  No crackles, wheezing or rhonchi.   CARDIOVASCULAR:  There is normal S1 and S2 with regular rhythm.  The rate is tachycardic.   ABDOMEN:  Soft, nontender, nondistended.  Bowel sounds are present.   EXTREMITIES:  There is no calf tenderness or lower extremity edema.   SKIN:  There is no jaundice, cyanosis or acute rash.   NEUROLOGIC:  He is awake, alert, oriented x 3.  There is no focal deficit on gross examination.      DATA:   1.  Hematology profile:  White count 7.9, hemoglobin 15, platelet count 224,000 and MCV 86.   2.  Chemistry profile:  Sodium 125, potassium 5.1, BUN 22, creatinine 0.92, glucose 527.   3.  Venous blood gas:  pH 7.34, pCO2 of 32, pO2 of 67, bicarbonate 17.   4.  Electrocardiogram: Per verbal report shows sinus tachycardia with a heart rate around 105.   5.  UA is positive for ketones and glucose.   6.  U-tox is positive for amphetamine.      ASSESSMENT AND PLAN:  Mr. Olman Newby is a delightful 32-year-old  gentleman with a past medical history notable for diabetes mellitus " type 1, substance abuse, active tobacco use, and depression/anxiety disorder who presented to [place] [address] Emergency Department with a complaint of nausea, vomiting and epigastric pain.  The workup was suggestive of early DKA.  He has been transferred over to this facility for further management.   1.  Early DKA: The patient has known history of DM type 1 and the most recent hemoglobin A1c was 7.6% on 05/20/2018. He has presented with epigastric discomfort, nausea and vomiting.  The workup has revealed ketones in the urine and elevated blood glucose of 507.  Bicarbonate was low at 17 on venous blood gas.  The patient was started on intravenous insulin.  His blood glucose is now down to 250.  I suspect we can manage his diabetes with resuming his prior to admission Lantus at 14 units subcu at bedtime as well as NovoLog 10 units subcu with meals t.i.d. I will keep the patient hydrated with normal saline at a rate of 150 mL per hour.  His blood glucose will be closely monitored.  I will place the patient on sliding scale NovoLog as well.  I will check the hemoglobin A1c level.  I will also check magnesium and phosphorus and replace any deficiency.  I will check serum ketones as well.   2.  Hyponatremia: Sodium level is 125 due to pseudohyponatremia in the setting of severe hyperglycemia.  His corrected sodium level is 135.   3.  Drug abuse:  The patient continues to smoke crystal meth.  U-tox was  positive for amphetamine.  The patient states he is in the process of undergoing chemical dependency treatment.  I will place a consult for Social Work Service.   4.  Tobacco dependence:  I will have nicotine gum available.   5.  Deep venous thrombosis prophylaxis:  Ambulation as well as pneumatic compression devices.      CODE STATUS:  FULL CODE BY DEFAULT.      DISPOSITION:  I anticipate discharge in 24 hours if his blood glucose remains well controlled.      I would like to thank Dr. Óscar Verdugo and Dr. Contreras  Yuniel for allowing the Hospitalist Service to participate in the care of this patient.         DEVORAH RAGLAND MD             D: 2018   T: 2018   MT:       Name:     RUFUS HOSKINS   MRN:      [id number]        Account:      [id number]   :      1985        Admitted:     2018                   Document: [id number]       cc: Diane Verdugo MD

## 2018-07-28 NOTE — PLAN OF CARE
Problem: Patient Care Overview  Goal: Plan of Care/Patient Progress Review  Outcome: Adequate for Discharge Date Met: 07/28/18  Pt A/O x4, frustrated about Insulin who says it should be 10 units with each meal plus 4-10 units sliding scale with each meal and Insulin ran out.  Pt verbalized understanding of discharge instructions including diet, activity, meds and follow up appts. Pt to  RX Insulin at own [facility]. Pt states family lives across the street and left walking after thanking nurse.

## 2018-07-28 NOTE — PLAN OF CARE
Problem: Patient Care Overview  Goal: Plan of Care/Patient Progress Review  Outcome: No Change  A/O but restless, anxious, pacing room, repeated requests, and occasionally noncompliant, refusing lab draw.  VSS ex HR 100s, O2sats 98% on RA. Denies pain or nausea. Independent, steady. 1000 ml bolus initiated. Plan to manage BG with lantus, SSI.

## 2018-07-28 NOTE — PLAN OF CARE
"Problem: Patient Care Overview  Goal: Plan of Care/Patient Progress Review  Outcome: Improving  VSS overnight. Pain free. Pt pleasant, but uncooperative at times. Pt refused q2h BP per [facility] protocol overnight -stating \"if you take that every two hrs and wake me I will leave, I will let you take it when I wake up though.\" Refused blood draws initially, but was able to agree after talking with pt. Pt restless and anxious, tapping foot and pacing room at times. Blood sugar improving. Pt hopeful to discharge this morning stating \"i'm better, just let me get out of here.\" Tele SR/ST, continue to monitor closely. Up independently.       "

## 2018-07-28 NOTE — DISCHARGE SUMMARY
[facility]    Discharge Summary  Hospitalist    Date of Admission:  7/27/2018  Date of Discharge:  7/28/2018  Discharging Provider: Primiitvo Mobley MD    Discharge Diagnoses   Principal Problem:    DKA (diabetic ketoacidoses) (H)  Active Problems:    DM type 1 -- Hgb A1C 7.6 on 5/20/18    Methamphetamine abuse    Nausea with vomiting    Hyponatremia      History of Present Illness   Olman Newby is an 32 year old male who presented nausea and vomiting associated with running out of his Novolog insulin -- and states his primary physician would not refill it.  Also complicated by ongoing methamphetamine use which he realizes is a problem and he has arranged inpatient treatment with [place] in [place], and he admits to meth use the day prior to admission, and urine tox screen positive for meth. He developed nausea and vomiting after missing his novolog insulin, Bicarb was 17 with blood glucose 507.  His sodium was 125.  He does smoke and does not desire to quit at this time.     Hospital Course   Admitted to [facility], given aggressive IV hydration, initially planned to treat with IV insulin drip, but instead was given frequent subcutaneous Novolog along with restarting Lantus, and bicarb increased to 20, potassium 4.2 and glucose down to 215, and tolerating oral intake, at which point patient refused further labs and was discharged.  He plans to keep his arranged admission to inpatient chem dep, and situation discussed with  but nothing additional added.  He will call if further problems.  He was provided with a vial of Novolog 1000 units, and advised to call if he ever needed a refill on his insulin (and educated that it was the cause of his DKA).      Primitivo Mobley MD  Pager: [phone]  Cell Phone:  [phone]       Significant Results and Procedures   As above    Pending Results   These results will be followed up by Dr. Mobley  Unresulted Labs Ordered  in the Past 30 Days of this Admission     No orders found for last 61 day(s).          Code Status   Full Code       Primary Care Physician   CELESTE MARTINEZ    Physical Exam   Temp: 98.9  F (37.2  C) Temp src: Oral BP: 93/55   Heart Rate: 106 Resp: 16 SpO2: 93 % O2 Device: (P) None (Room air)    There were no vitals filed for this visit.  Vital Signs with Ranges  Temp:  [98.9  F (37.2  C)] 98.9  F (37.2  C)  Heart Rate:  [102-109] 106  Resp:  [16-20] 16  BP: ()/(55-71) 93/55  SpO2:  [92 %-98 %] 93 %  I/O last 3 completed shifts:  In: 1812.5 [P.O.:150; I.V.:662.5; IV Piggyback:1000]  Out: 300 [Urine:300]    Exam on discharge:   Alert, Ox3    # Discharge Pain Plan:   - Patient currently has NO PAIN and is not being prescribed pain medications on discharge.      Discharge Disposition   Discharged to home  Condition at discharge: Good    Consultations This Hospital Stay   SOCIAL WORK IP CONSULT    Time Spent on this Encounter   I spent a total of 25 minutes discharging this patient.     Discharge Orders     Reason for your hospital stay   DKA associated with running out of Novolog insulin, and methamphetamie abuse     Follow-up and recommended labs and tests    Follow up with primary care provider, CELESTE MARTINEZ, in 5 to  7 days for hospital follow- up.  No follow up labs or test are needed.     Activity   Your activity upon discharge: activity as tolerated     Discharge Instructions   Call Dr. Garrison at Pager [phone] if questions, or Cell Phone [phone].     Full Code     Diet   Follow this diet upon discharge: Orders Placed This Encounter     Combination Diet Regular Diet Adult; 3165-6140 Calories: Moderate Consistent CHO (4-6 CHO units/meal)       Discharge Medications   Current Discharge Medication List      CONTINUE these medications which have CHANGED    Details   !! insulin aspart (NOVOLOG VIAL) 100 UNITS/ML injection 10 units tid with meals.  Qty: 1 vial, Refills: 3    Associated  "Diagnoses: Type 1 diabetes mellitus without complication (H)       !! - Potential duplicate medications found. Please discuss with provider.      CONTINUE these medications which have NOT CHANGED    Details   aspirin 81 MG tablet Take 1 tablet by mouth daily.      !! insulin aspart (NOVOLOG VIAL) 100 UNITS/ML injection Inject 4-10 units sq at mealtime prn  Qty: 10 mL, Refills: 11    Associated Diagnoses: Type 1 diabetes mellitus without complication (H)      insulin glargine (LANTUS VIAL) 100 UNIT/ML injection Inject 14 Units Subcutaneous At Bedtime  Qty: 10 mL, Refills: 1    Comments: Change from Levemir vial to Lantus vial Rx, due to insurance coverage.  TL  Associated Diagnoses: Type 1 diabetes mellitus without complication (H)      blood glucose calibration (ONETOUCH VERIO) solution UTD  Refills: 0      insulin syringe-needle U-100 (BD INSULIN SYRINGE ULTRAFINE) 31G X 5/16\" 0.5 ML Use one syringe 1-2 daily or as directed.  Qty: 150 each, Refills: 11    Associated Diagnoses: Type 1 diabetes mellitus without complication (H)      ONETOUCH VERIO IQ test strip Use to test blood sugar 3 times daily or as directed.  Qty: 100 strip, Refills: 11    Associated Diagnoses: Type 1 diabetes mellitus without complication (H)       !! - Potential duplicate medications found. Please discuss with provider.        Allergies   Allergies   Allergen Reactions     Sulfa Drugs      Data   Most Recent 3 CBC's:  Recent Labs   Lab Test  01/23/18   2111  10/08/17   1900  09/17/17   2345   WBC  10.6  7.2  9.6   HGB  15.5  16.5  16.1   MCV  88  87  89   PLT  302  292  301      Most Recent 3 BMP's:  Recent Labs   Lab Test  07/28/18   0100  01/23/18   2111  10/08/17   1900   NA  137  139  143   POTASSIUM  4.2  4.0  4.0   CHLORIDE  107  106  111*   CO2  20  27  24   BUN  15  12  9   CR  0.69  0.73  0.74   ANIONGAP  10  6  8   JOHN  7.3*  7.8*  8.1*   GLC  223*  57*  120*     Most Recent 2 LFT's:  Recent Labs   Lab Test  07/28/18   0100   AST  " 23   ALT  40   ALKPHOS  98   BILITOTAL  0.8     Most Recent INR's and Anticoagulation Dosing History:  Anticoagulation Dose History     There is no flowsheet data to display.        Most Recent 3 Troponin's:  Recent Labs   Lab Test  03/02/12   1552   TROPI  <0.012     Most Recent Cholesterol Panel:No lab results found.  Most Recent 6 Bacteria Isolates From Any Culture (See EPIC Reports for Culture Details):No lab results found.  Most Recent TSH, T4 and A1c Labs:No lab results found.

## 2018-08-01 LAB — INTERPRETATION ECG - MUSE: NORMAL

## 2018-08-03 ENCOUNTER — TELEPHONE (OUTPATIENT)
Dept: ENDOCRINOLOGY | Facility: CLINIC | Age: 33
End: 2018-08-03

## 2018-08-03 DIAGNOSIS — E10.9 TYPE 1 DIABETES MELLITUS WITHOUT COMPLICATION (H): ICD-10-CM

## 2018-08-03 NOTE — TELEPHONE ENCOUNTER
Messages noted.  We have now faxed the med list information as requested.    ELMIRA Verdugo MD  Endocrinology  [facility]

## 2018-08-03 NOTE — TELEPHONE ENCOUNTER
Dr. De La Cruz,   I spoke with pharmacist   Some confusion regarding NovoLog   One Rx on med list states 10 units at meals three times a day   Other Rx on med list states 4-10 units as needed at meals PLUS sliding scale - 1 units for BGL every 50 points above 100.     Pharmacy is requesting med list be faxed with this med listed only once on list - please advise if patient to take 10 units with meals plus sliding scale or 4-10 units as needed with meals along with the sliding scale?     Thank you,   Radha BELTRAN RN      Medication Detail         Disp Refills Start End SWAPNA     insulin aspart (NOVOLOG VIAL) 100 UNITS/ML injection 10 mL 11 7/27/2018  No     Sig: Inject 4-10 units sq at mealtime prn     Patient taking differently: Inject 0-5 Units Subcutaneous 3 times daily (with meals) Inject 1 unit for blood sugar every 50 over 100 (1 unit if -199, 2 units for -249, 3 units for -299, etc.)          Class: E-Prescribe     Order: [id number]     E-Prescribing Status: Receipt confirmed by pharmacy (7/27/2018  4:49 PM CDT)         Medication Detail         Disp Refills Start End SWAPNA     insulin aspart (NOVOLOG VIAL) 100 UNITS/ML injection 1 vial 3 7/28/2018  --     Sig - Route: 10 units tid with meals. - Subcutaneous     Class: E-Prescribe     Order: [id number]     E-Prescribing Status: Receipt confirmed by pharmacy (7/28/2018  9:24 AM CDT)       Printout Tracking

## 2018-08-03 NOTE — TELEPHONE ENCOUNTER
Reason for Call:  Medication or medication refill:    Do you use a [facility]?  Name of the pharmacy and phone number for the current request:  [facility] [id number] [facility] [address] AT [place] [address] & [address]    Name of the medication requested: insulin aspart (NOVOLOG VIAL) 100 UNITS/ML injection    Other request: everette needs varify dosage for novolog and needs med list faxed to number [phone].  Everette needs to have it asap if hes on a sliding scale ? Please advise    Can we leave a detailed message on this number? YES    Phone number patient can be reached at: Other phone number:  [phone]    Best Time: any    Call taken on 8/3/2018 at 9:24 AM by Sanjeev Donohue

## 2018-08-03 NOTE — TELEPHONE ENCOUNTER
Spoke with Urbano from [facility] and he is requesting the medication list faxed to  [phone]

## 2018-10-23 ENCOUNTER — MEDICAL CORRESPONDENCE (OUTPATIENT)
Dept: HEALTH INFORMATION MANAGEMENT | Facility: CLINIC | Age: 33
End: 2018-10-23

## 2018-10-23 ENCOUNTER — TRANSFERRED RECORDS (OUTPATIENT)
Dept: HEALTH INFORMATION MANAGEMENT | Facility: CLINIC | Age: 33
End: 2018-10-23

## 2018-10-23 ENCOUNTER — OFFICE VISIT (OUTPATIENT)
Dept: ENDOCRINOLOGY | Facility: CLINIC | Age: 33
End: 2018-10-23
Payer: COMMERCIAL

## 2018-10-23 VITALS
BODY MASS INDEX: 25.85 KG/M2 | HEIGHT: 68 IN | WEIGHT: 170.6 LBS | DIASTOLIC BLOOD PRESSURE: 74 MMHG | SYSTOLIC BLOOD PRESSURE: 109 MMHG | HEART RATE: 78 BPM

## 2018-10-23 DIAGNOSIS — E10.9 TYPE 1 DIABETES MELLITUS WITHOUT COMPLICATION (H): Primary | ICD-10-CM

## 2018-10-23 LAB — HBA1C MFR BLD: 8.6 % (ref 0–5.6)

## 2018-10-23 PROCEDURE — 80048 BASIC METABOLIC PNL TOTAL CA: CPT | Performed by: INTERNAL MEDICINE

## 2018-10-23 PROCEDURE — 99214 OFFICE O/P EST MOD 30 MIN: CPT | Performed by: INTERNAL MEDICINE

## 2018-10-23 PROCEDURE — 82043 UR ALBUMIN QUANTITATIVE: CPT | Performed by: INTERNAL MEDICINE

## 2018-10-23 PROCEDURE — 84443 ASSAY THYROID STIM HORMONE: CPT | Performed by: INTERNAL MEDICINE

## 2018-10-23 PROCEDURE — 36415 COLL VENOUS BLD VENIPUNCTURE: CPT | Performed by: INTERNAL MEDICINE

## 2018-10-23 PROCEDURE — 83036 HEMOGLOBIN GLYCOSYLATED A1C: CPT | Performed by: INTERNAL MEDICINE

## 2018-10-23 NOTE — PROGRESS NOTES
Name: Olman Newby      HPI:  Recent issues:  No significant issues since his right hand injury earlier this year  Recent problem getting refills on his insulin (vial) medication... Not now  Applying for new job at [employer]        Previous diagnosis of diabetes mellitus  Details of initial diagnosis, lab testing, and management not available.  Treatment with insulin injections  Using base-dose rapid acting insulin and daily long-acting insulin MDI plan  Infrequent BG testing, chronic higher hgbA1c levels in the 7.5-8.5% range overall  Current DM meds:   Novolog as 9U- Nv10- Nv10    Guestimates Nv sscale   Levemir 12U QHS  Using Verio BG meter, previous variable testing   Recently tests 4x/day   Reviewed recent BG trends, range  mg/dl   Good hypoglycemia awareness  Previous [facility] labs include:  Lab Results   Component Value Date     07/28/2018    POTASSIUM 4.2 07/28/2018    CHLORIDE 107 07/28/2018    CO2 20 07/28/2018    ANIONGAP 10 07/28/2018     (H) 07/28/2018    BUN 15 07/28/2018    CR 0.69 07/28/2018    GFRESTIMATED >90 07/28/2018    GFRESTBLACK >90 07/28/2018    JOHN 7.3 (L) 07/28/2018     History of substance abuse with alcohol, metamphetamines, reports abstinence of meth x 1 week.  Last eye exam this past week, no reported DR per patient  DM Complications:  None known      Sees Dr. Diane Brice/[facility] for general medicine evaluations.    PMH/PSH:  Past Medical History:   Diagnosis Date     Anxiety      Depression      Hypoglycemia      Substance abuse (H)      Type 1 diabetes (H)      No past surgical history on file.    Family Hx:  No family history on file.      Social Hx:  Social History     Social History     Marital status: Single     Spouse name: N/A     Number of children: N/A     Years of education: N/A     Occupational History     Not on file.     Social History Main Topics     Smoking status: Current Every Day Smoker     Packs/day: 0.50     Smokeless tobacco:  "Never Used     Alcohol use Yes      Comment: occ     Drug use: Yes     Special: Methamphetamines     Sexual activity: Not on file     Other Topics Concern     Not on file     Social History Narrative          MEDICATIONS:  has a current medication list which includes the following prescription(s): aspirin, blood glucose calibration, insulin aspart, insulin glargine, insulin syringe-needle u-100, and onetouch verio iq.    ROS:     ROS: 10 point ROS neg other than the symptoms noted above in the HPI.    GENERAL: energy good, no weight changes; denies fevers, chills, night sweats.   HEENT: no dysphagia, odonophagia, diplopia, neck pain  THYROID:  no apparent hyper or hypothyroid symptoms  CV: no chest pain, pressure, palpitations  LUNGS: no SOB, BRYAN, cough, wheezing   ABDOMEN: no diarrhea, constipation, abdominal pain  EXTREMITIES: no rashes, ulcers, edema  NEUROLOGY: no headaches, denies changes in vision, tingling, extremitiy numbness   MSK: no muscle aches or pains, weakness  SKIN: no rashes or lesions  PSYCH:  stable mood, no significant anxiety or depression  ENDOCRINE: no heat or cold intolerance    Physical Exam   VS: /74  Pulse 78  Ht 1.727 m (5' 8\")  Wt 77.4 kg (170 lb 9.6 oz)  BMI 25.94 kg/m2  GENERAL: AXOX3, NAD, slender, well dressed, answering questions appropriately, appears stated age.  THYROID:  normal gland, no apparent nodules or goiter  HEENT: neck non-tender, no exopthalmous, no proptosis, EOMI  CV: RRR, no rubs, gallops, no murmurs  LUNGS: CTAB, no wheezes, rales, or ronchi  ABDOMEN: soft, nontender, nondistended  EXTREMITIES: no edema, +pedal pulses  NEUROLOGY: CN grossly intact, no tremors  MSK: grossly intact  SKIN: scalp balding; no skin rashes or lesions noted    LABS:    All pertinent notes, labs, and images personally reviewed by me.     A/P:  Encounter Diagnosis   Name Primary?     Type 1 diabetes mellitus without complication (H) Yes     Comments:  Reviewed health history and " diabetes management.  Difficult to assess overall glucose control with limited recent BG test information.    Plan:  Discussed general issues with the diabetes diagnosis and management  We discussed the hgbA1c test which reflects previous overall glucose levels or control  Discussed the importance of blood glucose (BG) testing to assess glucose trends  Provided general overview of the multiple daily injection (MDI) plan using rapid acting mealtime and longacting insulin medications    Recommend:  Continue current Novolog and Levemir MDI insulin plan  When running low with Levemir, change to (formulary choice) Lantus vial insulin and contact our office when needing refill Rx   Would benefit from learning, using insulin-to-carb counting (I:C) method for mealtime insulin dosing.   Use Novolog 1U per 50 mg/dl > 150 mg/dl correction scale  Increase frequency of BG testing and/or start use of CGMS... Such as DexcomG6 or the Abbott Noam Personal   Spoke with local Dexcom rep Jose today   Will fax the Dexcom CMN form to Jose CAMPOS at her fax [phone]  We have discussed idea of seeing Certified Diabetes Educator (CDE) to review these topics, consider [facility] clinic closer to his home.  Advise having fasting lipid panel testing and dilated eye examination, at least annually    Addressed patient questions today    Labs ordered today:   Orders Placed This Encounter   Procedures     Basic metabolic panel     Hemoglobin A1c     TSH     Albumin Random Urine Quantitative with Creat Ratio     Radiology/Consults ordered today: None    More than 50% of the time spent with Mr. Newby on counseling / coordinating his care.  Total appointment time was 25 minutes.    Follow-up:  3 mo    Óscar Verdugo MD  Endocrinology  [facility]

## 2018-10-23 NOTE — MR AVS SNAPSHOT
"              After Visit Summary   10/23/2018    Olman Newby    MRN: [id number]           Patient Information     Date Of Birth          1985        Visit Information        Provider Department      10/23/2018 3:30 PM Óscar Verdugo MD [facility]        Today's Diagnoses     Type 1 diabetes mellitus without complication (H)    -  1       Follow-ups after your visit        Follow-up notes from your care team     Return in about 3 months (around 1/23/2019).      Who to contact     If you have questions or need follow up information about today's clinic visit or your schedule please contact [facility] directly at [phone].  Normal or non-critical lab and imaging results will be communicated to you by MyChart, letter or phone within 4 business days after the clinic has received the results. If you do not hear from us within 7 days, please contact the clinic through MyChart or phone. If you have a critical or abnormal lab result, we will notify you by phone as soon as possible.  Submit refill requests through [employer] or call your pharmacy and they will forward the refill request to us. Please allow 3 business days for your refill to be completed.          Additional Information About Your Visit        Care EveryWhere ID     This is your Care EveryWhere ID. This could be used by other organizations to access your [facility] medical records  [id number]        Your Vitals Were     Pulse Height BMI (Body Mass Index)             78 1.727 m (5' 8\") 25.94 kg/m2          Blood Pressure from Last 3 Encounters:   10/23/18 109/74   07/28/18 116/75   03/20/18 123/75    Weight from Last 3 Encounters:   10/23/18 77.4 kg (170 lb 9.6 oz)   03/20/18 77.1 kg (170 lb)   01/23/18 77.1 kg (170 lb)              We Performed the Following     Albumin Random Urine Quantitative with Creat Ratio     Basic metabolic panel     Hemoglobin A1c     TSH        Primary Care Provider Office Phone # Fax #    " "Diane Brice [phone]       [facility] [address] [address]        Equal Access to Services     MARLY SCANLON : Hadii debi alanis duane Soevelin, wawillda luqdenny, qafransiscata kaalmada alla, boogie campbellnaheed sol. So [facility] [phone].    ATENCIÓN: Si habla español, tiene a vora disposición servicios gratuitos de asistencia lingüística. Llame al [phone].    We comply with applicable federal civil rights laws and [place] laws. We do not discriminate on the basis of race, color, national origin, age, disability, sex, sexual orientation, or gender identity.            Thank you!     Thank you for choosing [facility]  for your care. Our goal is always to provide you with excellent care. Hearing back from our patients is one way we can continue to improve our services. Please take a few minutes to complete the written survey that you may receive in the mail after your visit with us. Thank you!             Your Updated Medication List - Protect others around you: Learn how to safely use, store and throw away your medicines at www.disposemymeds.org.          This list is accurate as of 10/23/18  4:30 PM.  Always use your most recent med list.                   Brand Name Dispense Instructions for use Diagnosis    aspirin 81 MG tablet      Take 1 tablet by mouth daily.        blood glucose calibration solution      UTD        insulin aspart 100 UNITS/ML injection    NovoLOG VIAL    10 mL    Inject 8-10 units subcutaneous at mealtime, as directed, total daily dose 35 units, E10.9    Type 1 diabetes mellitus without complication (H)       insulin glargine 100 UNIT/ML injection    LANTUS VIAL    10 mL    Inject 14 Units Subcutaneous At Bedtime    Type 1 diabetes mellitus without complication (H)       insulin syringe-needle U-100 31G X 5/16\" 0.5 ML    BD insulin syringe ultrafine    150 each    Use one syringe 1-2 daily or as directed.    Type 1 " diabetes mellitus without complication (H)       ONETOUCH VERIO IQ test strip   Generic drug:  blood glucose monitoring     100 strip    Use to test blood sugar 3 times daily or as directed.    Type 1 diabetes mellitus without complication (H)

## 2018-10-24 ENCOUNTER — TELEPHONE (OUTPATIENT)
Dept: ENDOCRINOLOGY | Facility: CLINIC | Age: 33
End: 2018-10-24

## 2018-10-24 DIAGNOSIS — E10.9 TYPE 1 DIABETES MELLITUS WITHOUT COMPLICATION (H): Primary | ICD-10-CM

## 2018-10-24 NOTE — TELEPHONE ENCOUNTER
"Fax from pharmacy requesting    0.5mL 30g x 1/2\" UF syringe needles    This is not the size listed in patient's chart   insulin syringe-needle U-100 (BD INSULIN SYRINGE ULTRAFINE) 31G X 5/16\" 0.5  each 11 6/20/2018  --   Sig: Use one syringe 1-2 daily or as directed.   Class: E-Prescribe   Order: [id number]   E-Prescribing Status: Receipt confirmed by pharmacy (6/20/2018  6:25 PM CDT)       Pended requested size - Rx needs completion and diagnosis.    LOV 10-23-18 TL (yesterday - missed opportunity to fill at [facility]).    Norma Portillo, RT (R)    "

## 2018-10-25 LAB
ANION GAP SERPL CALCULATED.3IONS-SCNC: 11 MMOL/L (ref 3–14)
BUN SERPL-MCNC: 10 MG/DL (ref 7–30)
CALCIUM SERPL-MCNC: 8.8 MG/DL (ref 8.5–10.1)
CHLORIDE SERPL-SCNC: 104 MMOL/L (ref 94–109)
CO2 SERPL-SCNC: 23 MMOL/L (ref 20–32)
CREAT SERPL-MCNC: 0.76 MG/DL (ref 0.66–1.25)
CREAT UR-MCNC: 69 MG/DL
GFR SERPL CREATININE-BSD FRML MDRD: >90 ML/MIN/1.7M2
GLUCOSE SERPL-MCNC: 89 MG/DL (ref 70–99)
MICROALBUMIN UR-MCNC: <5 MG/L
MICROALBUMIN/CREAT UR: NORMAL MG/G CR (ref 0–17)
POTASSIUM SERPL-SCNC: 4.1 MMOL/L (ref 3.4–5.3)
SODIUM SERPL-SCNC: 138 MMOL/L (ref 133–144)
TSH SERPL DL<=0.005 MIU/L-ACNC: 1.56 MU/L (ref 0.4–4)

## 2019-01-31 ENCOUNTER — TELEPHONE (OUTPATIENT)
Dept: ENDOCRINOLOGY | Facility: CLINIC | Age: 34
End: 2019-01-31

## 2019-01-31 NOTE — TELEPHONE ENCOUNTER
Reason for Call:  Medication or medication refill:    Do you use a [facility]?  Name of the pharmacy and phone number for the current request:      [facility] [address] [facility] [address] [place] [address] & [place].    Name of the medication requested: insulin aspart (NOVOLOG VIAL) 100 UNITS/ML injection    Other request: patient is out and needs this filled today!    Can we leave a detailed message on this number? YES    Phone number patient can be reached at: Home number on file [phone] (home)    Best Time: asap    Call taken on 1/31/2019 at 10:00 AM by Ella Montes  .

## 2019-01-31 NOTE — TELEPHONE ENCOUNTER
Called [facility] - they verified patient has refills of NovoLog on file and they can fill this for him

## 2019-02-01 ENCOUNTER — OFFICE VISIT (OUTPATIENT)
Dept: ENDOCRINOLOGY | Facility: CLINIC | Age: 34
End: 2019-02-01
Payer: COMMERCIAL

## 2019-02-01 VITALS
WEIGHT: 164.8 LBS | HEIGHT: 68 IN | HEART RATE: 108 BPM | BODY MASS INDEX: 24.98 KG/M2 | SYSTOLIC BLOOD PRESSURE: 128 MMHG | DIASTOLIC BLOOD PRESSURE: 82 MMHG

## 2019-02-01 LAB — HBA1C MFR BLD: 8 % (ref 0–5.6)

## 2019-02-01 PROCEDURE — 99214 OFFICE O/P EST MOD 30 MIN: CPT | Performed by: INTERNAL MEDICINE

## 2019-02-01 PROCEDURE — 83036 HEMOGLOBIN GLYCOSYLATED A1C: CPT | Performed by: INTERNAL MEDICINE

## 2019-02-01 PROCEDURE — 80048 BASIC METABOLIC PNL TOTAL CA: CPT | Performed by: INTERNAL MEDICINE

## 2019-02-01 PROCEDURE — 84443 ASSAY THYROID STIM HORMONE: CPT | Performed by: INTERNAL MEDICINE

## 2019-02-01 PROCEDURE — 36415 COLL VENOUS BLD VENIPUNCTURE: CPT | Performed by: INTERNAL MEDICINE

## 2019-02-01 ASSESSMENT — MIFFLIN-ST. JEOR: SCORE: 1657.03

## 2019-02-01 NOTE — PROGRESS NOTES
Name: Olman Newby      HPI:  Recent issues:  Here for f/u diabetes evaluation  Feeling pretty well overall, now working at [employer] in [place]        Previous diagnosis of diabetes mellitus  Details of initial diagnosis, lab testing, and management not available.  Treatment with insulin injections  Using base-dose rapid acting insulin and daily long-acting insulin MDI plan  Infrequent BG testing, chronic higher hgbA1c levels in the 7.5-8.5% range overall  Uses vial/syringe with insulin dosing, but sometimes postmeal Nv dosing  Current DM meds:   Novolog as 9U- Nv10- Nv10    Guestimates Nv sscale   Levemir 12U QHS  Using Verio BG meter, previous variable testing   Recently tests 4x/day   Reviewed recent BG trends, range  mg/dl   Good hypoglycemia awareness  Previous [facility] labs include:  Lab Results   Component Value Date    A1C 8.6 (H) 10/23/2018     10/23/2018    POTASSIUM 4.1 10/23/2018    CHLORIDE 104 10/23/2018    CO2 23 10/23/2018    ANIONGAP 11 10/23/2018    GLC 89 10/23/2018    BUN 10 10/23/2018    CR 0.76 10/23/2018    GFRESTIMATED >90 10/23/2018    GFRESTBLACK >90 10/23/2018    JOHN 8.8 10/23/2018    UCRR 69 10/23/2018    MICROL <5 10/23/2018    UMALCR Unable to calculate due to low value 10/23/2018     History of substance abuse with alcohol, oral metamphetamines, reports abstinence of meth x 2-3 days  Last eye exam 2018, no reported DR per patient  DM Complications:  None known      Lives with his parents, now working at [employer]  Sees Dr. Diane Brice/Ja Osorio for general medicine evaluations.    PMH/PSH:  Past Medical History:   Diagnosis Date     Anxiety      Depression      Hypoglycemia      Substance abuse (H)      Type 1 diabetes (H)      No past surgical history on file.    Family Hx:  No family history on file.      Social Hx:  Social History     Socioeconomic History     Marital status: Single     Spouse name: Not on file     Number of children: Not on file  "    Years of education: Not on file     Highest education level: Not on file   Social Needs     Financial resource strain: Not on file     Food insecurity - worry: Not on file     Food insecurity - inability: Not on file     Transportation needs - medical: Not on file     Transportation needs - non-medical: Not on file   Occupational History     Not on file   Tobacco Use     Smoking status: Current Every Day Smoker     Packs/day: 0.50     Smokeless tobacco: Never Used   Substance and Sexual Activity     Alcohol use: Yes     Comment: occ     Drug use: Yes     Types: Methamphetamines     Sexual activity: Not on file   Other Topics Concern     Not on file   Social History Narrative     Not on file          MEDICATIONS:  has a current medication list which includes the following prescription(s): aspirin, insulin aspart, insulin glargine, blood glucose calibration, insulin syringe-needle u-100, insulin syringe-needle u-100, and onetouch verio iq.    ROS:     ROS: 10 point ROS neg other than the symptoms noted above in the HPI.    GENERAL: energy good, no weight changes; denies fevers, chills, night sweats.   HEENT: no dysphagia, odonophagia, diplopia, neck pain  THYROID:  no apparent hyper or hypothyroid symptoms  CV: no chest pain, pressure, palpitations  LUNGS: no SOB, BRYAN, cough, wheezing   ABDOMEN: no diarrhea, constipation, abdominal pain  EXTREMITIES: no rashes, ulcers, edema  NEUROLOGY: no headaches, denies changes in vision, tingling, extremitiy numbness   MSK: no muscle aches or pains, weakness  SKIN: no rashes or lesions  PSYCH:  stable mood, no significant anxiety or depression  ENDOCRINE: no heat or cold intolerance    Physical Exam   VS: /82   Pulse 108   Ht 1.727 m (5' 8\")   Wt 74.8 kg (164 lb 12.8 oz)   BMI 25.06 kg/m    GENERAL: AXOX3, NAD, slender, well dressed, answering questions appropriately, appears stated age.  THYROID:  normal gland, no apparent nodules or goiter  HEENT: neck non-tender, " no exopthalmous, no proptosis, EOMI  CV: RRR, no rubs, gallops, no murmurs  LUNGS: CTAB, no wheezes, rales, or ronchi  ABDOMEN: soft, nontender, nondistended  EXTREMITIES: no edema, +pedal pulses  NEUROLOGY: CN grossly intact, no tremors  MSK: grossly intact  SKIN: scalp balding; no skin rashes or lesions noted    LABS:    All pertinent notes, labs, and images personally reviewed by me.     A/P:  Encounter Diagnosis   Name Primary?     Uncontrolled type 1 diabetes mellitus with complication (H) Yes     Comments:  Reviewed health history and diabetes management.  Difficult to assess overall glucose control with limited recent BG test information.    Plan:  Discussed general issues with the diabetes diagnosis and management  We discussed the hgbA1c test which reflects previous overall glucose levels or control  Discussed the importance of blood glucose (BG) testing to assess glucose trends  Provided general overview of the multiple daily injection (MDI) plan using rapid acting mealtime and longacting insulin medications    Recommend:  Continue current Novolog and Levemir MDI insulin plan  Would benefit from learning, using insulin-to-carb counting (I:C) method for mealtime insulin dosing.   Use Novolog 1U per 50 mg/dl > 150 mg/dl correction scale  Increase frequency of BG testing and/or start use of CGMS... Such as Bonds Noam Personal  Placed Diabetes Education Referral, consider [facility] clinic closer to his home.   Reviewed MDI plan and insulin dosing schedule   Discuss, start Noam Personal CGMS routine  Advise having fasting lipid panel testing and dilated eye examination, at least annually    Reminded him to abstain from methamphetamine and other illegal drug use  Discussed importance of having a primary care practitioner (FP or Int Med) for general medicine appointments and also acute care visits.  Addressed patient questions today    Labs ordered today:   Orders Placed This Encounter   Procedures      Hemoglobin A1c     Basic metabolic panel     TSH with free T4 reflex     DIABETES EDUCATOR REFERRAL     Radiology/Consults ordered today: DIABETES EDUCATOR REFERRAL    More than 50% of the time spent with Mr. Newby on counseling / coordinating his care.  Total appointment time was 25 minutes.    Follow-up:  3 mo    Óscar Verdugo MD  Endocrinology  [place]

## 2019-02-02 LAB
ANION GAP SERPL CALCULATED.3IONS-SCNC: 6 MMOL/L (ref 3–14)
BUN SERPL-MCNC: 15 MG/DL (ref 7–30)
CALCIUM SERPL-MCNC: 8.8 MG/DL (ref 8.5–10.1)
CHLORIDE SERPL-SCNC: 102 MMOL/L (ref 94–109)
CO2 SERPL-SCNC: 26 MMOL/L (ref 20–32)
CREAT SERPL-MCNC: 0.74 MG/DL (ref 0.66–1.25)
GFR SERPL CREATININE-BSD FRML MDRD: >90 ML/MIN/{1.73_M2}
GLUCOSE SERPL-MCNC: 80 MG/DL (ref 70–99)
POTASSIUM SERPL-SCNC: 4.3 MMOL/L (ref 3.4–5.3)
SODIUM SERPL-SCNC: 134 MMOL/L (ref 133–144)
TSH SERPL DL<=0.005 MIU/L-ACNC: 2.03 MU/L (ref 0.4–4)

## 2019-04-17 ENCOUNTER — TELEPHONE (OUTPATIENT)
Dept: ENDOCRINOLOGY | Facility: CLINIC | Age: 34
End: 2019-04-17

## 2019-04-17 ENCOUNTER — NURSE TRIAGE (OUTPATIENT)
Dept: NURSING | Facility: CLINIC | Age: 34
End: 2019-04-17

## 2019-04-17 DIAGNOSIS — E10.9 TYPE 1 DIABETES MELLITUS WITHOUT COMPLICATION (H): Primary | ICD-10-CM

## 2019-04-17 NOTE — TELEPHONE ENCOUNTER
Regarding: medication request  ----- Message from Radha Mercre sent at 4/17/2019  6:29 PM CDT -----  Reason for call:  Other   Patient called regarding (reason for call): prescription  Additional comments: Patient is requesting a prescription for a pen novalog    Phone number to reach patient:  Cell number on file:  Telephone Information:  Mobile          [phone]      Best Time:  any    Can we leave a detailed message on this number?  YES

## 2019-04-17 NOTE — TELEPHONE ENCOUNTER
Reason for Call:  Medication or medication refill:    Do you use a [facility]?  Name of the pharmacy and phone number for the current request:  [id number] [facility] [place], [place] [address] AT [place] [address] & [address]      Name of the medication requested: insulin aspart (NOVOLOG VIAL) 100 UNITS/ML injection       Other request: pt broke his vile of novolog and needs a new rx for this.      Can we leave a detailed message on this number? YES    Phone number patient can be reached at: Home number on file [phone] (home)    Best Time: any    Call taken on 4/17/2019 at 9:17 AM by Orin Reza

## 2019-04-17 NOTE — TELEPHONE ENCOUNTER
Called and spoke with pharmacy-     [facility] R-11 on 8/3/18    Pt has refills available- Pharmacist checked to see if insurance will cover this.     Per pharmacy- Too soon to fill until 4/24 per insurance. Advised to call insurance to get override and then call pharmacy to prepare refill.     Updated patient- he believes he was told by the Pharmacist at [facility] that his insurance company will not authorize overrides/early fills.  Advised again that pt call insurance company directly to find out exactly what needs to be done to get him a refill. If he finds out he does need us to write a new RX, please call us back.     Pt agreed with plan,     Jessica BASILIO RN

## 2019-04-18 NOTE — TELEPHONE ENCOUNTER
Message noted from FNA, called patient.  He dropped his Novolog vial and it broke, needs short-acting insulin and was told it would be expensive since he is not due for vial refill until later this month.  I recommended substituting the Relion Regular insulin vial, which is much cheaper from [facility] pharmacy.  We discussed the dosing of Regular insulin and he agreed, new vial Rx sent to [facility] in [place].    I asked him to call us back if any problems acquiring this insulin Rx.  He agreed, thanked us for our assistance.    ELMIRA Verdugo MD  Endocrinology   [facility] [place]

## 2019-04-18 NOTE — TELEPHONE ENCOUNTER
"RN called Olman back regarding his request for a Novalog prescription. Pt states he broke his vial this morning and his insurance wont cover a refill, so pt is requesting a new prescription for an insulin pen. RN paged on call provider Óscar Verdugo MD for [facility] at 7:14pm via smart web to call RN directly back at [phone].     Dr Verdugo called RN back and stated he will call pt directly to discuss alternatives. RN called pt to inform him that Dr Verdugo will be calling.     Trini Cazares RN/[facility]      Reason for Disposition    [1] Request for URGENT new prescription or refill of \"essential\" medication (i.e., likelihood of harm to patient if not taken) AND [2] triager unable to fill per unit policy    Protocols used: MEDICATION QUESTION CALL-ADULT-    "

## 2019-06-03 DIAGNOSIS — E10.9 TYPE 1 DIABETES MELLITUS WITHOUT COMPLICATION (H): ICD-10-CM

## 2019-06-04 RX ORDER — INSULIN GLARGINE 100 [IU]/ML
INJECTION, SOLUTION SUBCUTANEOUS
Qty: 10 ML | Refills: 0 | Status: SHIPPED | OUTPATIENT
Start: 2019-06-04 | End: 2019-06-05

## 2019-06-04 NOTE — TELEPHONE ENCOUNTER
Prescription approved per [facility] Refill Protocol.  Geneva SALDIVAR RN

## 2019-06-04 NOTE — TELEPHONE ENCOUNTER
"insulin glargine (LANTUS VIAL) 100 UNIT/ML injection 10 mL 1 3/21/2018     Last Written Prescription Date:  3/21/2018  Last Fill Quantity: 10 mL,  # refills: 1   Last office visit: 2/1/2019 with prescribing provider: SWETHA Verdugo   Future Office Visit:   Next 5 appointments (look out 90 days)    Jul 19, 2019  1:30 PM CDT  Return Visit with Óscar Verdugo MD  [facility] ([address] [address] [phone]         Requested Prescriptions   Pending Prescriptions Disp Refills     LANTUS VIAL 100 UNIT/ML soln [Pharmacy Med Name: LANTUS U-100 INSULIN 10ML] 10 mL 0     Sig: INJECT 14 UNITS SUBCUTANEOUS AT BEDTIME       Long Acting Insulin Protocol Failed - 6/4/2019 11:59 AM        Failed - LDL on file in past 12 months     No lab results found.          Failed - HgbA1C in past 3 or 6 months     If HgbA1C is 8 or greater, it needs to be on file within the past 3 months.  If less than 8, must be on file within the past 6 months.     Recent Labs   Lab Test 02/01/19  1421   A1C 8.0*             Passed - Blood pressure less than 140/90 in past 6 months     BP Readings from Last 3 Encounters:   02/01/19 128/82   10/23/18 109/74   07/28/18 116/75                 Passed - Microalbumin on file in past 12 months     Recent Labs   Lab Test 10/23/18  1627   MICROL <5   UMALCR Unable to calculate due to low value             Passed - Serum creatinine on file in past 12 months     Recent Labs   Lab Test 02/01/19  1421   CR 0.74             Passed - Medication is active on med list        Passed - Patient is age 18 or older        Passed - Recent (6 mo) or future (30 days) visit within the authorizing provider's specialty     Patient had office visit in the last 6 months or has a visit in the next 30 days with authorizing provider or within the authorizing provider's specialty.  See \"Patient Info\" tab in inbasket, or \"Choose Columns\" in Meds & Orders section of the " refill encounter.

## 2019-06-07 ENCOUNTER — TELEPHONE (OUTPATIENT)
Dept: ENDOCRINOLOGY | Facility: CLINIC | Age: 34
End: 2019-06-07

## 2019-06-07 NOTE — TELEPHONE ENCOUNTER
Dr. Verdugo-     Please see message below.     Pharmacy is pended, if needed.     Thank you!  Jessica BASILIO RN

## 2019-06-07 NOTE — TELEPHONE ENCOUNTER
Unusual message noted.  I called patient's mother Ani back this afternoon and reviewed the information from her.  She said Olman is currently in the group home and may be there up to 34 days.  He was receiving Novolog insulin but no Lantus.  His father did find 2 Basaglar pens (previously used long ago, not ) and pen needles, had taken them to the group home but apparently not being used.    [facility] spoke with a nurse or nurse aid named Carlton, reviewed the patient's T1DM diagnosis and usual insulin dosing routine (Novolog 9U at breakfast, 10U with lunch and supper, Novolog sscale if high glucose levels also, Lantus 12 units each evening) and explained the Basaglar would be an acceptable substitute for Lantus using the same 12U evening daily dose.  Carlton confirmed that the Basaglar insulin was available, understood the insulin dosing schedule that I explained to him, and said he would be reviewing these insulin instructions with his medical supervisor.    I gave Carlton my name and cell phone number and asked him to have the medical supervisor call me if any questions, and he agreed.  I then called the patient's mother Ani back and explained this story... She thanked me for the calls.    ELMIRA Verdugo MD  Endocrinology  [facility]/[place]

## 2019-06-07 NOTE — TELEPHONE ENCOUNTER
Reason for call:  Order   Order or referral being requested: to be given diabetic medication while an inmate at [facility]. [facility] is withholding insulin medication.    Reason for request: Blood sugars have been 450 - 500 in the past few days. Ani, mom, is requesting a call back at # [phone]  Date needed: asap  Has the patient been seen by the PCP for this problem? YES  Pt sees   Additional comments:

## 2019-06-11 ENCOUNTER — TELEPHONE (OUTPATIENT)
Dept: ENDOCRINOLOGY | Facility: CLINIC | Age: 34
End: 2019-06-11

## 2019-06-11 NOTE — TELEPHONE ENCOUNTER
----- Message from Óscar Verdugo MD sent at 6/6/2019  2:12 PM CDT -----  Regarding: Lantus vial PA  I received 2 refill requests today regarding the patient's Lantus Rx for his Type 1 diabetes management.  The nurse message indicated Lantus not formulary, switch to Basaglar.  This patient uses vial and syringe (not pens) and Basaglar does not come in a vial.  Please initiate a PA for the Lantus vial Rx.  Thanks.    ELMIRA Verdugo MD  Endocrinology  [facility]/[place]

## 2019-06-11 NOTE — TELEPHONE ENCOUNTER
Per routing comment from Dr. Verdugo:    Let's cancel the Lantus PA and go with the Basaglar pens.  His is in custodial now, but apparently able to get the Basaglar pens.  Thanks.     ELMIRA Verdugo MD   Endocrinology   [facility]/[place], [place] on 6/11/2019 at 2:41 PM

## 2019-07-19 ENCOUNTER — OFFICE VISIT (OUTPATIENT)
Dept: ENDOCRINOLOGY | Facility: CLINIC | Age: 34
End: 2019-07-19
Payer: COMMERCIAL

## 2019-07-19 VITALS
DIASTOLIC BLOOD PRESSURE: 65 MMHG | HEIGHT: 68 IN | HEART RATE: 79 BPM | WEIGHT: 164.9 LBS | SYSTOLIC BLOOD PRESSURE: 98 MMHG | BODY MASS INDEX: 24.99 KG/M2

## 2019-07-19 DIAGNOSIS — E10.9 TYPE 1 DIABETES MELLITUS WITHOUT COMPLICATION (H): Primary | ICD-10-CM

## 2019-07-19 PROCEDURE — 99213 OFFICE O/P EST LOW 20 MIN: CPT | Performed by: INTERNAL MEDICINE

## 2019-07-19 RX ORDER — INSULIN GLARGINE 100 [IU]/ML
INJECTION, SOLUTION SUBCUTANEOUS
Refills: 0 | COMMUNITY
Start: 2019-06-07 | End: 2020-02-14 | Stop reason: ALTCHOICE

## 2019-07-19 ASSESSMENT — MIFFLIN-ST. JEOR: SCORE: 1657.48

## 2019-07-19 NOTE — PROGRESS NOTES
Name: Olman Newby      HPI:  Recent issues:  Here for f/u diabetes evaluation  Recent incarcerated at [facility] for 28 days, for outstanding warant   Initial problems with [facility] staff... Not receiving basal insulin   Subsequent communication with me (see note) and started Basaglar 14U every day with his Nv insulin routine  Now interviewing for [employer] in [place]  Feeling pretty well overall          Previous diagnosis of diabetes mellitus  Details of initial diagnosis, lab testing, and management not available.  Treatment with insulin injections  Using base-dose rapid acting insulin and daily long-acting insulin MDI plan  Infrequent BG testing, chronic higher hgbA1c levels in the 7.5-8.5% range overall  Uses vial/syringe with insulin dosing, but sometimes postmeal Nv dosing  Current DM meds:   Novolog as 9-10U at mealtimes    Guestimates Nv sscale   Basaglar 14U at bedtime (syringe use from the pen)    Using Verio BG meter, previous variable testing   Recently tests 4x/day   Low BG at 37 mg/dl this morning, but no meter available today   Good hypoglycemia awareness  Previous [facility] labs include:  Lab Results   Component Value Date    A1C 8.0 (H) 02/01/2019     02/01/2019    POTASSIUM 4.3 02/01/2019    CHLORIDE 102 02/01/2019    CO2 26 02/01/2019    ANIONGAP 6 02/01/2019    GLC 80 02/01/2019    BUN 15 02/01/2019    CR 0.74 02/01/2019    GFRESTIMATED >90 02/01/2019    GFRESTBLACK >90 02/01/2019    JOHN 8.8 02/01/2019    UCRR 69 10/23/2018    MICROL <5 10/23/2018    UMALCR Unable to calculate due to low value 10/23/2018     Lab Results   Component Value Date    TSH 2.03 02/01/2019     History of substance abuse with alcohol, oral metamphetamines, reports abstinence of meth x 2 days  Last eye exam 2018, no reported DR per patient  DM Complications:  None known      Lives with his parents, daughter Fabien age 14  Sees Dr. Diane Brice/[facility] for general medicine  evaluations.    PMH/PSH:  Past Medical History:   Diagnosis Date     Anxiety      Depression      Hypoglycemia      Substance abuse (H)      Type 1 diabetes (H)      No past surgical history on file.    Family Hx:  No family history on file.      Social Hx:  Social History     Socioeconomic History     Marital status: Single     Spouse name: Not on file     Number of children: Not on file     Years of education: Not on file     Highest education level: Not on file   Occupational History     Not on file   Social Needs     Financial resource strain: Not on file     Food insecurity:     Worry: Not on file     Inability: Not on file     Transportation needs:     Medical: Not on file     Non-medical: Not on file   Tobacco Use     Smoking status: Current Every Day Smoker     Packs/day: 0.50     Smokeless tobacco: Never Used   Substance and Sexual Activity     Alcohol use: Yes     Comment: occ     Drug use: Yes     Types: Methamphetamines     Sexual activity: Not on file   Lifestyle     Physical activity:     Days per week: Not on file     Minutes per session: Not on file     Stress: Not on file   Relationships     Social connections:     Talks on phone: Not on file     Gets together: Not on file     Attends Christianity service: Not on file     Active member of club or organization: Not on file     Attends meetings of clubs or organizations: Not on file     Relationship status: Not on file     Intimate partner violence:     Fear of current or ex partner: Not on file     Emotionally abused: Not on file     Physically abused: Not on file     Forced sexual activity: Not on file   Other Topics Concern     Not on file   Social History Narrative     Not on file          MEDICATIONS:  has a current medication list which includes the following prescription(s): aspirin, insulin aspart, insulin glargine, blood glucose calibration, insulin regular, insulin syringe-needle u-100, insulin syringe-needle u-100, and onetouch verio  "iq.    ROS:     ROS: 10 point ROS neg other than the symptoms noted above in the HPI.    GENERAL: energy good, no weight changes; denies fevers, chills, night sweats.   HEENT: no dysphagia, odonophagia, diplopia, neck pain  THYROID:  no apparent hyper or hypothyroid symptoms  CV: no chest pain, pressure, palpitations  LUNGS: no SOB, BRYAN, cough, wheezing   ABDOMEN: no diarrhea, constipation, abdominal pain  EXTREMITIES: no rashes, ulcers, edema  NEUROLOGY: no headaches, denies changes in vision, tingling, extremitiy numbness   MSK: no muscle aches or pains, weakness  SKIN: no rashes or lesions  PSYCH:  stable mood, no significant anxiety or depression  ENDOCRINE: no heat or cold intolerance    Physical Exam   VS: BP 98/65   Pulse 79   Ht 1.727 m (5' 8\")   Wt 74.8 kg (164 lb 14.4 oz)   BMI 25.07 kg/m    GENERAL: AXOX3, NAD, slender, well dressed, answering questions appropriately, appears stated age.  THYROID:  normal gland, no apparent nodules or goiter  ABDOMEN: soft, nontender, nondistended  EXTREMITIES: no edema  NEUROLOGY: CN grossly intact, no tremors  MSK: grossly intact  SKIN: scalp balding; no skin rashes or lesions noted    LABS:    All pertinent notes, labs, and images personally reviewed by me.     A/P:  Encounter Diagnosis   Name Primary?     Type 1 diabetes mellitus without complication (H) Yes       Comments:  Reviewed health history and diabetes management.  Difficult to assess overall glucose control with limited recent BG test information.  Discussed circumstances with his recent diabetes issues during incarceration    Plan:  Discussed general issues with the diabetes diagnosis and management  We discussed the hgbA1c test which reflects previous overall glucose levels or control  Discussed the importance of blood glucose (BG) testing to assess glucose trends  Provided general overview of the multiple daily injection (MDI) plan using rapid acting mealtime and longacting insulin " medications    Recommend:  Continue current Novolog and and Basaglar med use   Lower Basaglar to 13U at bedtime   OK to use Basaglar syringe dose from the pen supply  Would benefit from learning, using insulin-to-carb counting (I:C) method for mealtime insulin dosing.   Use Novolog 1U per 50 mg/dl > 150 mg/dl correction scale  No labs ordered today... His hgbA1c would be high  Continue BG testing premeal, bring meter to clinic appointments  Increase frequency of BG testing and/or start use of CGMS... Such as Bonds Noam Personal  Advise having fasting lipid panel testing and dilated eye examination, at least annually    Reminded him to abstain from methamphetamine and other illegal drug use  We have discussed importance of having a primary care practitioner (FP or Int Med) for general medicine appointments and also acute care visits.  Addressed patient questions today    Labs ordered today:   No orders of the defined types were placed in this encounter.    Radiology/Consults ordered today: None    More than 50% of the time spent with Mr. Newby on counseling / coordinating his care.  Total appointment time was 20 minutes.    Follow-up:  3 mo    Óscar Verdugo MD  Endocrinology  [place]

## 2019-11-11 DIAGNOSIS — E10.9 TYPE 1 DIABETES MELLITUS WITHOUT COMPLICATION (H): ICD-10-CM

## 2019-11-11 NOTE — TELEPHONE ENCOUNTER
BD Insulin Syringe Ultrafine    Last Written Prescription Date:  10/24/18  Last Fill Quantity: 150 each,  # refills: 11   Last office visit: 7/19/2019 with prescribing provider:  Lucina   Future Office Visit:

## 2019-11-11 NOTE — TELEPHONE ENCOUNTER
Reason for Call:  Medication or medication refill: Syringes     Do you use a [facility]?  Name of the pharmacy and phone number for the current request:         [id number] - [facility] [place] [address]      Name of the medication requested: Syringes     Other request: pt called stated he is out of syringes and he is requesting a refill.     Can we leave a detailed message on this number? YES    Phone number patient can be reached at: Home number on file [phone] (home)    Best Time: Anytime     Call taken on 11/11/2019 at 2:33 PM by Janet Mcdaniel

## 2019-11-12 NOTE — TELEPHONE ENCOUNTER
A 30 day supply is given, patient is due for an office visit.  Please call to  assist the patient in scheduling an appointment.  MIRNA Gomez, RN  [facility]

## 2019-12-31 DIAGNOSIS — E10.9 TYPE 1 DIABETES MELLITUS WITHOUT COMPLICATION (H): ICD-10-CM

## 2019-12-31 NOTE — TELEPHONE ENCOUNTER
Sent in refill, pt is at pharmacy and needs insulin refill so sent.  Verified dosing. Pt will see Dr. Verdugo in February.  Prescription approved per [facility] Refill Protocol.  Marija Lindsay RN  [facility]

## 2019-12-31 NOTE — TELEPHONE ENCOUNTER
Reason for Call:  Medication or medication refill:    Do you use a [facility]?  Name of the pharmacy and phone number for the current request:     [id number] - [place], [place] - [address] AT [place] [address] & [address]        Name of the medication requested: insulin aspart (NOVOLOG VIAL) 100 UNITS/ML vial       Other request: pt is out of his medication     Can we leave a detailed message on this number? YES    Phone number patient can be reached at: Home number on file [phone] (home)    Best Time: any    Call taken on 12/31/2019 at 12:25 PM by Orin Reza

## 2019-12-31 NOTE — TELEPHONE ENCOUNTER
Pt is calling for insulin refill, say he is out, and need now. He is at [facility] pharmacy now

## 2019-12-31 NOTE — TELEPHONE ENCOUNTER
"Routing refill request to provider for review/approval because:  Labs not current:  LDL, A1C        Next 5 appointments (look out 90 days)    Feb 14, 2020  2:00 PM CST  Return Visit with Óscar Verdugo MD  [facility] ([facility] [address] [place] [id number] [phone]            Requested Prescriptions   Pending Prescriptions Disp Refills     insulin aspart (NOVOLOG VIAL) 100 UNITS/ML vial 10 mL 5     Sig: INJECT 8 TO 10 UNITS UNDER THE SKIN AT MEALTIME AS DIRECTED, TOTAL DAILY DOSE APPROX 35 UNITS       Short Acting Insulin Protocol Failed - 12/31/2019 12:35 PM        Failed - LDL on file in past 12 months     No lab results found.          Failed - HgbA1C in past 3 or 6 months     If HgbA1C is 8 or greater, it needs to be on file within the past 3 months.  If less than 8, must be on file within the past 6 months.     Recent Labs   Lab Test 02/01/19  1421   A1C 8.0*             Passed - Blood pressure less than 140/90 in past 6 months     BP Readings from Last 3 Encounters:   07/19/19 98/65   02/01/19 128/82   10/23/18 109/74                 Passed - Microalbumin on file in past 12 months     Recent Labs   Lab Test 10/23/18  1627   MICROL <5   UMALCR Unable to calculate due to low value             Passed - Serum creatinine on file in past 12 months     Recent Labs   Lab Test 02/01/19  1421   CR 0.74             Passed - Medication is active on med list        Passed - Patient is age 18 or older        Passed - Recent (6 mo) or future (30 days) visit within the authorizing provider's specialty     Patient had office visit in the last 6 months or has a visit in the next 30 days with authorizing provider or within the authorizing provider's specialty.  See \"Patient Info\" tab in inbasket, or \"Choose Columns\" in Meds & Orders section of the refill encounter.                "

## 2020-01-16 DIAGNOSIS — E10.9 TYPE 1 DIABETES MELLITUS WITHOUT COMPLICATION (H): ICD-10-CM

## 2020-01-16 NOTE — TELEPHONE ENCOUNTER
"  Requested Prescriptions   Pending Prescriptions Disp Refills     BD INSULIN SYRINGE U/F 30G X 1/2\" 0.5 ML miscellaneous [Pharmacy Med Name: B-D #8466 SYR/NDL 0.5ML 30GX1/2 UF]       Sig: USE ONE SYRINGE 4 TIMES A DAY OR AS DIRECTED. SEE PRESCRIBER FOR FUTURE REFILLS.       Diabetic Supplies Protocol Passed - 1/16/2020  2:11 PM        Passed - Medication is active on med list        Passed - Patient is 18 years of age or older        Passed - Recent (6 mo) or future (30 days) visit within the authorizing provider's specialty     Patient had office visit in the last 6 months or has a visit in the next 30 days with authorizing provider.  See \"Patient Info\" tab in inbasket, or \"Choose Columns\" in Meds & Orders section of the refill encounter.            Prescription approved per [facility] Refill Protocol.  Radha BELTRAN RN    "

## 2020-02-12 DIAGNOSIS — E10.9 TYPE 1 DIABETES MELLITUS WITHOUT COMPLICATION (H): ICD-10-CM

## 2020-02-12 NOTE — TELEPHONE ENCOUNTER
"insulin aspart (NOVOLOG VIAL) 100 UNITS/ML vial  Last Written Prescription Date:  12/31/2019  Last Fill Quantity: 20 mL,  # refills: 0   Last office visit: 10/11/2019 with prescribing provider:  Lucina   Future Office Visit:   Next 5 appointments (look out 90 days)    Feb 14, 2020  2:00 PM CST  Return Visit with Óscar Verdugo MD  [facility] ([place] [id number] [address] [phone]         Requested Prescriptions   Pending Prescriptions Disp Refills     insulin aspart (NOVOLOG VIAL) 100 UNITS/ML vial [Pharmacy Med Name: NOVOLOG U-100 INSULIN VL10ML(ORANG)] 20 mL 0     Sig: INJECT 8 TO 10 UNITS UNDER THE SKIN AT MEALTIME AS DIRECTED, TOTAL DAILY DOSE APPROXIMATELY 35 UNITS       Short Acting Insulin Protocol Failed - 2/12/2020 11:00 AM        Failed - Blood pressure less than 140/90 in past 6 months     BP Readings from Last 3 Encounters:   07/19/19 98/65   02/01/19 128/82   10/23/18 109/74                 Failed - LDL on file in past 12 months     No lab results found.          Failed - Microalbumin on file in past 12 months     Recent Labs   Lab Test 10/23/18  1627   MICROL <5   UMALCR Unable to calculate due to low value             Failed - Serum creatinine on file in past 12 months     Recent Labs   Lab Test 02/01/19  1421   CR 0.74             Failed - HgbA1C in past 3 or 6 months     If HgbA1C is 8 or greater, it needs to be on file within the past 3 months.  If less than 8, must be on file within the past 6 months.     Recent Labs   Lab Test 02/01/19  1421   A1C 8.0*             Passed - Medication is active on med list        Passed - Patient is age 18 or older        Passed - Recent (6 mo) or future (30 days) visit within the authorizing provider's specialty     Patient had office visit in the last 6 months or has a visit in the next 30 days with authorizing provider or within the authorizing provider's specialty.  See \"Patient Info\" tab " "in inbasket, or \"Choose Columns\" in Meds & Orders section of the refill encounter.              "

## 2020-02-12 NOTE — TELEPHONE ENCOUNTER
Medication filled 1 time as pt is due for a follow-up in clinic. Patient is already scheduled for upcoming appointment.Leticia BELTRAN RN

## 2020-02-14 ENCOUNTER — OFFICE VISIT (OUTPATIENT)
Dept: ENDOCRINOLOGY | Facility: CLINIC | Age: 35
End: 2020-02-14
Payer: COMMERCIAL

## 2020-02-14 VITALS
OXYGEN SATURATION: 97 % | HEART RATE: 96 BPM | SYSTOLIC BLOOD PRESSURE: 119 MMHG | BODY MASS INDEX: 25.54 KG/M2 | WEIGHT: 168 LBS | DIASTOLIC BLOOD PRESSURE: 75 MMHG

## 2020-02-14 DIAGNOSIS — E10.9 TYPE 1 DIABETES MELLITUS WITHOUT COMPLICATION (H): Primary | ICD-10-CM

## 2020-02-14 LAB — HBA1C MFR BLD: 7.9 % (ref 0–5.6)

## 2020-02-14 PROCEDURE — 99213 OFFICE O/P EST LOW 20 MIN: CPT | Performed by: INTERNAL MEDICINE

## 2020-02-14 PROCEDURE — 83036 HEMOGLOBIN GLYCOSYLATED A1C: CPT | Performed by: INTERNAL MEDICINE

## 2020-02-14 PROCEDURE — 84460 ALANINE AMINO (ALT) (SGPT): CPT | Performed by: INTERNAL MEDICINE

## 2020-02-14 PROCEDURE — 99207 C FOOT EXAM  NO CHARGE: CPT | Performed by: INTERNAL MEDICINE

## 2020-02-14 PROCEDURE — 84443 ASSAY THYROID STIM HORMONE: CPT | Performed by: INTERNAL MEDICINE

## 2020-02-14 PROCEDURE — 80048 BASIC METABOLIC PNL TOTAL CA: CPT | Performed by: INTERNAL MEDICINE

## 2020-02-14 PROCEDURE — 36415 COLL VENOUS BLD VENIPUNCTURE: CPT | Performed by: INTERNAL MEDICINE

## 2020-02-14 RX ORDER — INSULIN GLARGINE 100 [IU]/ML
14 INJECTION, SOLUTION SUBCUTANEOUS AT BEDTIME
Qty: 10 ML | Refills: 11 | Status: SHIPPED | OUTPATIENT
Start: 2020-02-14 | End: 2020-03-04

## 2020-02-14 NOTE — PROGRESS NOTES
Name: Olman Newby      HPI:  Recent issues:  Here for f/u diabetes evaluation, though he came 1:45 min late for today's appt  Completed chemical dependency outpatient treatment at [facility] in [place] in 1/2020  Hoping to start new job at [employer] soon  Feeling pretty well overall          Previous diagnosis of diabetes mellitus  Details of initial diagnosis, lab testing, and management not available.  Treatment with insulin injections  Using base-dose rapid acting insulin and daily long-acting insulin MDI plan  Infrequent BG testing, chronic higher hgbA1c levels in the 7.5-8.5% range overall  Uses vial/syringe with insulin dosing, but sometimes postmeal Nv dosing  Current DM meds:  Novolog  9-10U at mealtimes    Guestimates Nv sscale  Levemir 14U at bedtime (syringe use from the pen)    Using Verio BG meter, previous variable testing   Recently tests 4x/day   Good hypoglycemia awareness  Previous [facility] labs include:  Lab Results   Component Value Date    A1C 8.0 (H) 02/01/2019     02/01/2019    POTASSIUM 4.3 02/01/2019    CHLORIDE 102 02/01/2019    CO2 26 02/01/2019    ANIONGAP 6 02/01/2019    GLC 80 02/01/2019    BUN 15 02/01/2019    CR 0.74 02/01/2019    GFRESTIMATED >90 02/01/2019    GFRESTBLACK >90 02/01/2019    JOHN 8.8 02/01/2019    UCRR 69 10/23/2018    MICROL <5 10/23/2018    UMALCR Unable to calculate due to low value 10/23/2018     Lab Results   Component Value Date    TSH 2.03 02/01/2019     History of substance abuse with alcohol, oral metamphetamines  Last eye exam 2018, no reported DR per patient  DM Complications:  None known      Lives with his parents, daughter Fabien age 15  Sees Dr. Diane Brice/Ja Osorio for general medicine evaluations.    PMH/PSH:  Past Medical History:   Diagnosis Date     Anxiety      Depression      Hypoglycemia      Substance abuse (H)      Type 1 diabetes (H)      No past surgical history on file.    Family Hx:  No family history on  file.      Social Hx:  Social History     Socioeconomic History     Marital status: Single     Spouse name: Not on file     Number of children: Not on file     Years of education: Not on file     Highest education level: Not on file   Occupational History     Not on file   Social Needs     Financial resource strain: Not on file     Food insecurity:     Worry: Not on file     Inability: Not on file     Transportation needs:     Medical: Not on file     Non-medical: Not on file   Tobacco Use     Smoking status: Current Every Day Smoker     Packs/day: 0.50     Smokeless tobacco: Never Used   Substance and Sexual Activity     Alcohol use: Yes     Comment: occ     Drug use: Yes     Types: Methamphetamines     Sexual activity: Not on file   Lifestyle     Physical activity:     Days per week: Not on file     Minutes per session: Not on file     Stress: Not on file   Relationships     Social connections:     Talks on phone: Not on file     Gets together: Not on file     Attends Faith service: Not on file     Active member of club or organization: Not on file     Attends meetings of clubs or organizations: Not on file     Relationship status: Not on file     Intimate partner violence:     Fear of current or ex partner: Not on file     Emotionally abused: Not on file     Physically abused: Not on file     Forced sexual activity: Not on file   Other Topics Concern     Not on file   Social History Narrative     Not on file          MEDICATIONS:  has a current medication list which includes the following prescription(s): insulin aspart, insulin glargine, insulin syringe-needle u-100, aspirin, blood glucose calibration, insulin regular, insulin syringe-needle u-100, and onetouch verio iq.    ROS:     ROS: 10 point ROS neg other than the symptoms noted above in the HPI.    GENERAL: energy good, no weight changes; denies fevers, chills, night sweats.   HEENT: no dysphagia, odonophagia, diplopia, neck pain  THYROID:  no apparent  hyper or hypothyroid symptoms  CV: no chest pain, pressure, palpitations  LUNGS: no SOB, BRYAN, cough, wheezing   ABDOMEN: no diarrhea, constipation, abdominal pain  EXTREMITIES: no rashes, ulcers, edema  NEUROLOGY: no headaches, denies changes in vision, tingling, extremitiy numbness   MSK: no muscle aches or pains, weakness  SKIN: no rashes or lesions  PSYCH:  stable mood, no significant anxiety or depression  ENDOCRINE: no heat or cold intolerance    Physical Exam   VS: /75 (BP Location: Right arm, Patient Position: Chair, Cuff Size: Adult Regular)   Pulse 96   Wt 76.2 kg (168 lb)   SpO2 97%   BMI 25.54 kg/m    GENERAL: AXOX3, NAD, slender, well dressed, answering questions appropriately, appears stated age.  THYROID:  normal gland, no apparent nodules or goiter  ABDOMEN: soft, nontender, nondistended  EXTREMITIES: no edema, +pulses, mild callous at base of 1st MT, no feet skin lesions  NEUROLOGY: CN grossly intact, no tremors  MSK: grossly intact  SKIN: scalp balding; no skin rashes or lesions noted    LABS:    All pertinent notes, labs, and images personally reviewed by me.     A/P:  Encounter Diagnosis   Name Primary?     Type 1 diabetes mellitus without complication (H) Yes       Comments:  Reviewed health history and diabetes management.  Difficult to assess overall glucose control with limited recent BG test information.  Discussed circumstances with his recent diabetes issues during incarceration    Plan:  Discussed general issues with the diabetes diagnosis and management  We discussed the hgbA1c test which reflects previous overall glucose levels or control  Discussed the importance of blood glucose (BG) testing to assess glucose trends  Provided general overview of the multiple daily injection (MDI) plan using rapid acting mealtime and longacting insulin medications    Recommend:  Continue current MDI insulin injection plan  Try to use more precise Novolog correction scale as:  Use a  rapid-acting insulin correction scale for blood glucose levels >150 mg/dl:      mg/dl  No correction   151-200 mg/dl  +1 units   201-250 mg/dl  +2 units   251-300 mg/dl  +3 units   301-350 mg/dl  +4 units   350-400 mg/dl  +5 units   401-450 mg/dl  +6 units and call physician    Needs updated basal insulin Rx, prefers vial/syringe... will update his Levemir vial Rx  Would benefit from learning, using insulin-to-carb counting (I:C) method for mealtime insulin dosing.  Check labs today  Continue BG testing premeal, bring meter to clinic appointments  Increase frequency of BG testing and/or start use of CGMS... Such as [employer] Personal  Advise having fasting lipid panel testing and dilated eye examination, at least annually  Be on-time for endocrinology appointments    Reminded him to abstain from methamphetamine and other illegal drug use  We have discussed importance of having a primary care practitioner (FP or Int Med) for general medicine appointments and also acute care visits.  Addressed patient questions today    Labs ordered today:   Orders Placed This Encounter   Procedures     C FOOT EXAM  NO CHARGE     Hemoglobin A1c     ALT     Basic metabolic panel     TSH     Radiology/Consults ordered today: C FOOT EXAM  NO CHARGE    More than 50% of the time spent with Mr. Newby on counseling / coordinating his care.  Total appointment time was 20 minutes.    Follow-up:  4 mo    RON Verdugo MD, MS  Endocrinology  [facility]

## 2020-02-15 LAB
ALT SERPL W P-5'-P-CCNC: 53 U/L (ref 0–70)
ANION GAP SERPL CALCULATED.3IONS-SCNC: 4 MMOL/L (ref 3–14)
BUN SERPL-MCNC: 9 MG/DL (ref 7–30)
CALCIUM SERPL-MCNC: 8.6 MG/DL (ref 8.5–10.1)
CHLORIDE SERPL-SCNC: 102 MMOL/L (ref 94–109)
CO2 SERPL-SCNC: 31 MMOL/L (ref 20–32)
CREAT SERPL-MCNC: 0.71 MG/DL (ref 0.66–1.25)
GFR SERPL CREATININE-BSD FRML MDRD: >90 ML/MIN/{1.73_M2}
GLUCOSE SERPL-MCNC: 171 MG/DL (ref 70–99)
POTASSIUM SERPL-SCNC: 3.8 MMOL/L (ref 3.4–5.3)
SODIUM SERPL-SCNC: 137 MMOL/L (ref 133–144)
TSH SERPL DL<=0.005 MIU/L-ACNC: 1.34 MU/L (ref 0.4–4)

## 2020-03-03 DIAGNOSIS — E10.9 TYPE 1 DIABETES MELLITUS WITHOUT COMPLICATION (H): Primary | ICD-10-CM

## 2020-03-03 NOTE — TELEPHONE ENCOUNTER
"Requested Prescriptions   Pending Prescriptions Disp Refills     blood glucose (NO BRAND SPECIFIED) test strip 100 each 1     Sig: Use to test blood sugar 3 times daily or as directed.       Diabetic Supplies Protocol Passed - 3/3/2020  3:17 PM        Passed - Medication is active on med list        Passed - Patient is 18 years of age or older        Passed - Recent (6 mo) or future (30 days) visit within the authorizing provider's specialty     Patient had office visit in the last 6 months or has a visit in the next 30 days with authorizing provider.  See \"Patient Info\" tab in inbasket, or \"Choose Columns\" in Meds & Orders section of the refill encounter.            Last Written Prescription Date:  Strips for old machine 7/8/19  Last Fill Quantity: 100 strip,  # refills: 1   Last office visit: 2/14/2020 with prescribing provider:     Future Office Visit:      Brenda Mathias MA      "

## 2020-03-03 NOTE — TELEPHONE ENCOUNTER
Reason for Call:  Medication or medication refill:    Do you use a [facility]?  Name of the pharmacy and phone number for the current request:     [id number] - [place], [place] - [address] AT [place] [address] & [address]        Name of the medication requested: glucose test strips for the Accucheck christine    Other request: pt has none left has been without for a few days    Can we leave a detailed message on this number? YES    Phone number patient can be reached at: Home number on file [phone] (home)    Best Time: any    Call taken on 3/3/2020 at 3:07 PM by Luis Eduardo Da Silva

## 2020-03-04 DIAGNOSIS — E10.9 TYPE 1 DIABETES MELLITUS WITHOUT COMPLICATION (H): ICD-10-CM

## 2020-03-04 RX ORDER — INSULIN GLARGINE 100 [IU]/ML
14 INJECTION, SOLUTION SUBCUTANEOUS AT BEDTIME
Qty: 10 ML | Refills: 11 | Status: SHIPPED | OUTPATIENT
Start: 2020-03-04 | End: 2021-02-18

## 2020-03-04 NOTE — TELEPHONE ENCOUNTER
Prescription approved per [facility] Refill Protocol.  Left VM informing pt Rx sent  Geneva SALDIVAR RN

## 2020-05-15 DIAGNOSIS — E10.9 TYPE 1 DIABETES MELLITUS WITHOUT COMPLICATION (H): ICD-10-CM

## 2020-05-15 RX ORDER — IBUPROFEN 200 MG
TABLET ORAL
Start: 2020-05-15

## 2020-09-10 ENCOUNTER — TRANSFERRED RECORDS (OUTPATIENT)
Dept: HEALTH INFORMATION MANAGEMENT | Facility: CLINIC | Age: 35
End: 2020-09-10

## 2020-09-10 LAB — RETINOPATHY: NEGATIVE

## 2020-09-30 DIAGNOSIS — E10.9 TYPE 1 DIABETES MELLITUS WITHOUT COMPLICATION (H): ICD-10-CM

## 2020-10-01 RX ORDER — BLOOD SUGAR DIAGNOSTIC
STRIP MISCELLANEOUS
Qty: 300 STRIP | Refills: 0 | Status: SHIPPED | OUTPATIENT
Start: 2020-10-01 | End: 2021-02-18

## 2020-10-01 NOTE — TELEPHONE ENCOUNTER
Medication is being filled for 1 time refill only due to:  Patient needs to be seen because due for follow up appointment.

## 2020-11-02 NOTE — TELEPHONE ENCOUNTER
LANTUS VIAL 100 UNIT/ML soln is not under formulary. Prior Auth will take too long to because Pt will run out.      [facility] [id number] [address] is requesting a new Rx for something that is covered by Pt's insurance    pharma ph: [phone]  Fax: [phone]       This is the Subsequent Medicare Annual Wellness Exam, performed 12 months or more after the Initial AWV or the last Subsequent AWV    I have reviewed the patient's medical history in detail and updated the computerized patient record. History     Patient Active Problem List   Diagnosis Code    Personal history of colonic polyps Z86.010    Breast cancer ([facility] [address] C50.919    Pure hyperglyceridemia E78.1    Prediabetes R73.03     Past Medical History:   Diagnosis Date    Arthritis     Breast cancer ([facility] [address] 10/31/2009    right lumpectomy    Cancer ([facility] [address]     right breast cancer    Hypercholesterolemia     Personal history of colonic polyps 2013      Past Surgical History:   Procedure Laterality Date    BREAST SURGERY PROCEDURE UNLISTED      right lumpectomy    HX BREAST BIOPSY Right     HX BREAST LUMPECTOMY Right 2006    no radiation or chemo    HX GYN      hysterectomy 70's    HX PREMALIG/BENIGN SKIN LESION EXCISION      Excison soft tissue mass of back- Janet Granda MD    HX UROLOGICAL  10/19/2017    bladder prolapse     Current Outpatient Medications   Medication Sig Dispense Refill    atorvastatin (LIPITOR) 20 mg tablet TAKE 1 TABLET NIGHTLY 90 Tab 3    cetirizine (ZYRTEC) 10 mg tablet       krill-omega-3-dha-epa-lipids (KRILL OIL) 814-85-04-50 mg cap Take 1 Cap by mouth daily.  multivitamin (ONE A DAY) tablet Take 1 Tab by mouth daily.          No Known Allergies    Family History   Problem Relation Age of Onset    Heart Disease Mother          from mi   Carey Fried Diabetes Father     Diabetes Sister     Heart Disease Sister         heart failure    Diabetes Brother     Liver Disease Sister     Heart Disease Son         Stent placed     Social History     Tobacco Use    Smoking status: Former Smoker     Packs/day: 2.00     Years: 10.00     Pack years: 20.00     Last attempt to quit: 1978     Years since quittin.8    Smokeless tobacco: Never Used   Substance Use Topics    Alcohol use: Yes     Comment: seldom       Depression Risk Factor Screening:     3 most recent PHQ Screens 11/2/2020   Little interest or pleasure in doing things Not at all   Feeling down, depressed, irritable, or hopeless Not at all   Total Score PHQ 2 0       Alcohol Risk Screen   Do you average more than 1 drink per night or more than 7 drinks a week:  No    On any one occasion in the past three months have you have had more than 3 drinks containing alcohol:  No        Functional Ability and Level of Safety:   Hearing: Hearing is good. Activities of Daily Living: The home contains: no safety equipment. Patient does total self care     Ambulation: with no difficulty     Fall Risk:  Fall Risk Assessment, last 12 mths 11/2/2020   Able to walk? Yes   Fall in past 12 months? No     Abuse Screen:  Patient is not abused     Lives w   Cognitive Screening   Has your family/caregiver stated any concerns about your memory: no    Cognitive Screening: Normal - Mini Cog Test        No memory concerns   Pt knows the month, day and year   Can recall 3/3 objects   Patient Care Team   Patient Care Team:  Danilo Ruiz MD as PCP - General (Internal Medicine)  Danilo Ruiz MD as PCP - [facility] Empaneled Provider  Huseyin Holden MD (Hematology and Oncology)  Margarette Maldonado DPM (Podiatry)  Mynor Wood OD (Ophthalmology)  CHARITO Guzman MD (Gastroenterology)  Danny Roman MD (Gynecology)  Jeanette Lafleur MD as Surgeon (General Surgery)   Dr traci castellano      updated    Assessment/Plan   Education and counseling provided:  Are appropriate based on today's review and evaluation  End-of-Life planning (with patient's consent)  Influenza Vaccine  Screening Mammography  Screening Pap and pelvic (covered once every 2 years)  Bone mass measurement (DEXA)  Screening for glaucoma  Diabetes screening test    Diagnoses and all orders for this visit:    1.  Medicare annual wellness visit, subsequent        Health Maintenance Due   Topic Date Due    GLAUCOMA SCREENING Q2Y  2017    Flu Vaccine (1) 2020    Medicare Yearly Exam  2020       Discussed with patient about advance medical directive. Provided patient blank AMD and Your Right to Decide Booklet. Requested that if completed to provide a copy of AMD to office lov    ACP not on file.  SDM is her  Damon Coburn). Provided information --has at home.         Colonoscopy: 13, Dr. Yareli Sotelo, likely 10 year repeat, does not specify on report, advised to contact office  Pap: Schley Zach, , hysterectomy in 1970s, due reminded  Mammogram: 20 nl  Dexa: declines       Tdap: declines  Pneumovax: 16  [id number]: 3/16/17  Zostavax: 10/26/12  Shingrix: 1st round completed , 2nd round completed   Flu shot: 19, due now      Lipids: 10/20  annual   Hep C Screen: 3/17, negative  A1C:  10/20 5.6    Eye exam: Dr. Dimitry Almazan shows, , scheduled     EK17, nsr      Medication reconciliation completed by MA and reviewed by me. Medical/surgical/social/family history reviewed and updated by me. Patient provided AVS and preventative screening table. Patient verbalized understanding of all information discussed. Alon Kilpatrick, who was evaluated through a synchronous (real-time) audio-video encounter, and/or her healthcare decision maker, is aware that it is a billable service, with coverage as determined by her insurance carrier. She provided verbal consent to proceed: Yes, and patient identification was verified. It was conducted pursuant to the emergency declaration under the [address] [facility], [address] [place] [address] authority and the [employer] and [employer] General Act. A caregiver was present when appropriate. Ability to conduct physical exam was limited. I was at home. The patient was at home.     Coy Jimenes MD

## 2021-03-22 DIAGNOSIS — E10.9 TYPE 1 DIABETES MELLITUS WITHOUT COMPLICATION (H): ICD-10-CM

## 2021-03-22 NOTE — TELEPHONE ENCOUNTER
B-D INSULIN SYRINGE 30G X 1/2 0.5 ML    Summary: USE ONE SYRINGE 4 TIMES A DAY  Disp-400 each, R-3  E-Prescribe   Start: 2/14/2020  Ord/Sold: 2/14/2020

## 2021-03-24 RX ORDER — PEN NEEDLE, DIABETIC 29 G X1/2"
NEEDLE, DISPOSABLE MISCELLANEOUS
Qty: 200 EACH | Refills: 0 | Status: SHIPPED | OUTPATIENT
Start: 2021-03-24 | End: 2021-06-17

## 2021-03-24 NOTE — TELEPHONE ENCOUNTER
Medication filled 1 time as pt is due for a follow-up in clinic. LVM with male for patient to call us back - to schedule appt with Dr Verdugo

## 2021-03-25 DIAGNOSIS — E10.9 TYPE 1 DIABETES MELLITUS WITHOUT COMPLICATION (H): ICD-10-CM

## 2021-03-26 NOTE — TELEPHONE ENCOUNTER
Called and left message with spouse for patient to call and schedule appointment with Dr Verdugo ---overdue.     Medication is being filled for 1 time refill only due to:  Patient needs to be seen because it has been more than one year since last visit.     Lillian BRADLEY, RN      March 26, 2021  9:34 AM

## 2021-06-01 DIAGNOSIS — E10.9 TYPE 1 DIABETES MELLITUS WITHOUT COMPLICATION (H): ICD-10-CM

## 2021-06-02 NOTE — TELEPHONE ENCOUNTER
Accu-chek Armida Plus test strips    Summary: USE TO TEST THREE TIMES DAILY AS DIRECTED, Disp-300 strip, R-0, E-Prescribe  Please notify pt due for a visit (clinic or virtual). Please remind to call [phone] to schedule.     Start: 2/18/2021  Ord/Sold: 2/18/2021    Novolog U-100 Insulin vial    Summary: INJECT 8 TO 10 UNITS UNDER THE SKIN AT MEALTIME AS DIRECTED, TOTAL DAILY DOSE APPROXIMATELY 35 UNITS. Due for appointment. Please schedule: [phone], Disp-20 mL, R-0, E-Prescribe   Start: 3/26/2021  Ord/Sold: 3/26/2021

## 2021-06-02 NOTE — TELEPHONE ENCOUNTER
[address] [facility]                                                            OFFICE NOTE        Ze Urban M.D.,LUDWIG Lis Epps   1968  [id number]    Date/Time:  10/16/53632:24 PM          SUBJECTIVE:  Doing well no cp or sob or palpitations  He remains active at work ([employer])       Assessment/Plan  PAF: he remains in NSR after AF ablation on 9/15. dronedarone stopped on 1/1/16. Previous holter with no AF and NSR. His [id number] VASC score is 3 with 3.2 % cva risk. Continue asa and plavix. Now off eliquis. He will alert me right away if any recurrent  palpitations fatigue sob and off course chest pain. Once again He tells me that when he was in NSR he felt absolutely great therefore he is very symptomatic when in AF   Closely followed by          CAD: completely asymptomatic and active . His ecg shows NSR small inferior q waves and no significant st t changes  His nuclear stress on 6/17 failed to reveal any ischemia, fixed apical defect discussed and potential explanations also discussed. Continue medical rx only unless of occurring symptoms   given underlying dm we will proceed with stress echo >2 years after last one  Weight loss and exercise also discussed     HTN:  controlled      HLD: on crestor and tricor. Followed by his pcp he tells me  ldl goal<70     AS: mild some fusion of 2 cusps. proceed with echo     Obesity: discussed importance of weight loss and exercise !!     ZAK on cpap     AODM: closely followed by his pcp also on jardiance        See him back after echo and stress test in 2-3 weeks                      HPI   45 y.o. male. history of HTN , HLP, CAD/MI in 2008. S/p PCI LAD in 2008 and additional stent in LAD in 2011. He also has residual 70% RV branch stenosis.     Admitted to [facility] with AF and RVR on 7/15 nuclear stress test at that time with no ischemia, apical Routing RX request to Endocrinology to be completed.       attenuation ( sub endo mi) and EF 54%. Echo also on 7/15: EF 55-60% no rwma,  bicuspid AV and mild AS    Admitted to [facility] on 7/15 for recurrent AF. DOLORES showed no NEWTON, normal LV and tri leaflet AV with possible partial fusion of 2 leaflets, mild AS, AI and MR . He failed cardioversion with persistent AF   successful dc cardioversion on 7/15 while on multaq    Recurrent AF afterwards on 7/15  and eventually AF ablation with Dr. Myah Cintron on 9/15    Stopped toprol on his own accord on 11/15 on account of excessive sob    He has GB disease and stones 2015  Echo on 8/16:Left ventricle: Size was normal. Systolic function was normal by visual  assessment. Ejection fraction was estimated to be 60 %. There were no  regional wall motion abnormalities. Wall thickness was mildly increased. Aortic valve: There was an apparent echogenic mass of tissue appearing in  the outflow tract adherent to the right coronary cusp; unknown if  clinically significant. Leaflets exhibited moderately increased thickness,  moderate calcification, and mildly reduced cuspal separation. Transaortic  velocity was increased due to valvular stenosis. There was mild stenosis. VIRA was 2.0 cm2, peak/mean gradients were 29/17 mmHg, and the DI was 0.45. There was mild regurgitation. Aorta, systemic arteries: The root exhibited normal size; however, the  ascending aorta was mildly enlarged; sinus of valsalva was 3.8 cm, the  ST-J was 3.5 cm, and the ascending aorta was 4.0 cm. ER visit for cp on 5/17 normal w/u but elevated BP    NUCLEAR STRESS TEST on 6/2/17 no gross ischemia fixed apical and infero apical defect EF 60%     ECHO on 5/17: EF 60% mild AS mean gradient of 14 mmhg mild [place]              CARDIAC STUDIES                   07/09/15   NM CARDIAC SPECT W STRS/REST MULT 07/10/2015 7/10/2015    Narrative **Final Report**       ICD Codes / Adm. Diagnosis: 427.31  [id number] / Atrial fibrillation (HCC)    Irregular Heart Beat  Examination:  NM CARD SPECT MULT WALL EF  - [id number] - Jul 10 2015 11:44AM  Accession No:  [id number]  Reason:  cp af     ADDENDUM   Left ventricular ejection fraction equals 54%. END OF ADDENDUM     REPORT:  EXAM:  NM CARD SPECT MULT WALL EF    INDICATION: af cp    COMPARISON:  2010    CORRELATIVE IMAGING STUDIES:         TRACER: Tc 99m Sestamibi    TECHNIQUE:  Resting SPECT images of the heart were obtained following the   uneventful intravenous administration of 33 mCi of Tc 99m Sestamibi. Gated stress SPECT images of the heart were obtained following LexiScan   protocol and the uneventful intravenous administration of 33 mCi of Tc 99m   Sestamibi. FINDINGS: At rest and stress, there is diminished activity in the apex   similar to the prior study. The gated images demonstrate normal global and regional wall motion and   thickening. Left ventricular ejection fraction is 34 %. IMPRESSION:   There is diminished activity in the apex at rest and stress similar to the   prior study of 2010 and could represent apical infarction versus attenuation   artifact. There is no definite evidence of ischemia. Left ventricular   ejection fraction is 54 %. **SEE ADDENDUM AT TOP OF THE REPORT**     Signing/Reading Doctor: Brea Melgar ([id number]) Addendum Reading: Harriett Olivares ([id number])  Approved: Brea Melgar ([id number])  Jul 10 2015  1:12PM                        Addendum Approved: Brea Melgar ([id number])  07/10/2015            Signed by: Arielle Sanchez MD                      EKG Results     None              IMAGING      MRI Results (most recent):  Results from [place] encounter on 08/25/15   MRA CHEST W CONT    Narrative **Final Report**       ICD Codes / Adm. Diagnosis: 427.31  401.1 / Atrial fibrillation [facility])    Examination:  MR CHEST MRA W CON  - [id number] - Aug 25 2015 12:05PM  Accession No:  [id number]  Reason:  preop      REPORT:    CARDIOVASCULAR MRI    INDICATION: preop    PROCEDURAL DATA:    CPT Codes: 85396    SCANS PERFORMED:   Spin Echo: Axial.  Pulmonary Vein Angiogram    Contrast Agent: Magnevist.  Contrast Dose: 40ml. CLINICAL DATA:  HR = 74/min, BP = 135/91mmHg, HT = 5 feet 10, WT = 274lb. IMPRESSION:    1. All pulmonary veins visualized draining the left atrium. There is no   accessory pulmonary veins. There is no sinus venosus defect. The left upper   and lower pulmonary vein drain close to each other without significant   separation. The esophagus courses behind the left atrium close to the os of   the left upper and lower pulmonary veins. The individual pulmonary vein   ostial dimensions are as follows:    Right upper pulmonary vein: 23 x 21 mm. Right lower pulmonary vein: 28 x 21 mm. Left upper pulmonary vein: 19 x 18 mm. Left lower pulmonary vein: 25 x 19 mm.   2. These is no left atrial appendage thrombus. 3. Normal origin of the great vessels of the aortic arch. Normal ascending   aorta and descending thoracic aorta without aneurysm or dissection. Signing/Reading Doctor: Mani Arellano ([id number])    Ari Rossi ([id number])  Aug 25 2015  4:22PM                                      CT Results (most recent):  Results from [place] encounter on 10/01/15   CT NECK SOFT TISSUE W CONT    Narrative **Final Report**       ICD Codes / Adm. Diagnosis: V45.82  Z98.61 / Abscess  Neck abcess  Examination:  CT SOFT TISSUE NECK W CON  - [id number] - Oct  1 2015  9:43PM  Accession No:  [id number]  Reason:  Pain      REPORT:  EXAM:  CT SOFT TISSUE NECK W CON     INDICATION: Postsurgical pain and neck abscess. COMPARISON: None. CONTRAST: 97 mL of Isovue-300. The patient was instructed to discontinue   metformin for 48 hours and check with his physician before resuming the   medication. TECHNIQUE: Multislice helical CT was performed from the mid calvarium to the   pulmonary massimo during uneventful rapid bolus intravenous contrast   administration.   Contiguous 2.5 mm axial images were reconstructed and lung   and soft tissue windows were generated. Coronal reformations were generated. CT dose reduction was achieved through use of a standardized protocol   tailored for this examination and automatic exposure control for dose   modulation. FINDINGS:    No mass or adenopathy is identified within the neck. The carotid and jugular vessels enhance normally. The patient has a large   neck and there are engorged veins in the neck bilaterally. The thyroid gland, submandibular salivary glands and parotid glands are   normal.    No nasopharyngeal, pharyngeal or laryngeal mass is identified. No abnormalities are identified in the visualized portions of the brain or   orbits. The visualized mastoid air cells and paranasal sinuses are clear. The visualized portions of the lung apices are clear. There are mild degenerative changes of the upper thoracic spine. IMPRESSION: No mass, adenopathy or abscess. Signing/Reading Doctor: Fatou Elliott ([id number])    Approved: Fatou Elliott ([id number])  Oct  1 2015  9:56PM                                   XR Results (most recent):  Results from Hospital Encounter encounter on 05/09/17   XR CHEST PA LAT    Narrative INDICATION: Intermittent chest pain since Friday. Tonight pain moved into upper  chest with heaviness in left arm. Hypertension. EXAM: CXR 2 View    FINDINGS: Frontal and lateral views of the chest show clear lungs. Heart size is  normal. There is no pulmonary edema. There is no evident pneumothorax,  adenopathy or effusion. Impression IMPRESSION: Normal two view chest x-ray.              Past Medical History:   Diagnosis Date    Arthritis     HTN (hypertension), benign 3/31/2010    Mixed hyperlipidemia 3/31/2010    ZAK (obstructive sleep apnea) 3/31/2010     Past Surgical History:   Procedure Laterality Date    CARDIAC SURG PROCEDURE UNLIST      4 stents placed 2009,2011    HX AFIB ABLATION      HX ANGIOPLASTY      HX ORTHOPAEDIC  2007    L Knee Arthroscopy     Social History     Tobacco Use    Smoking status: Never Smoker    Smokeless tobacco: Never Used   Substance Use Topics    Alcohol use: No     Alcohol/week: 0.0 standard drinks    Drug use: No     Family History   Problem Relation Age of Onset    Arthritis-osteo Mother     Diabetes Mother     Elevated Lipids Mother     Heart Disease Mother     Hypertension Mother     Elevated Lipids Father     Heart Disease Father     Hypertension Father     Cancer Father         skin and angiosarcoma    Diabetes Maternal Grandmother     Hypertension Brother     Diabetes Brother     Hypertension Brother     Diabetes Brother     Hypertension Brother      Allergies   Allergen Reactions    Cleocin [Clindamycin Hcl] Rash         Visit Vitals  Ht 5' 10\" (1.778 m)   Wt 265 lb (120.2 kg)   BMI 38.02 kg/m²         Last 3 Recorded Weights in this Encounter    10/16/20 1315   Weight: 265 lb (120.2 kg)            Review of Systems:   Pertinent items are noted in the History of Present Illness. Neck: no JVD  Heart: regular rate and rhythm, systolic murmur: systolic ejection 3/6, crescendo at 2nd right intercostal space  Lungs: clear to auscultation bilaterally  Abdomen: soft, non-tender. Bowel sounds normal. No masses,  no organomegaly  Extremities: no edema      Current Outpatient Medications on File Prior to Visit   Medication Sig Dispense Refill    dilTIAZem ER (Cartia XT) 240 mg capsule TAKE 1 CAPSULE BY MOUTH ONCE DAILY  Keep follow up on 10-16-20 for further refills. 90 Cap 0    fenofibrate nanocrystallized (TRICOR) 145 mg tablet Take 1 Tab by mouth daily. Keep follow up on 10-16-20 for further refills. 90 Tab 0    rosuvastatin (CRESTOR) 40 mg tablet Take 1 Tab by mouth daily. Keep follow up on 10-16-20 for further refills.  90 Tab 0    icosapent ethyL (Vascepa) 1 gram capsule Take 2 Caps by mouth two (2) times daily (with meals). 360 Cap 0    nebivoloL (Bystolic) 10 mg tablet Take 1 Tab by mouth daily. 90 Tab 0    hydroCHLOROthiazide (HYDRODIURIL) 25 mg tablet TAKE 1 TABLET BY MOUTH ONCE DAILY (keep follow up on 10-16-20) 90 Tab 0    clopidogrel (PLAVIX) 75 mg tab TAKE 1 TABLET BY MOUTH ONCE DAILY 30 Tab 6    fosinopril (MONOPRIL) 40 mg tablet TAKE 1 TABLET BY MOUTH ONCE DAILY 30 Tab 6    sildenafil, antihypertensive, (REVATIO) 20 mg tablet TAKE UP TO 5 TABS BY MOUTH AS DIRECTED  11    OTHER humulog insulin 20 units/24 hours continuously via insulin pump.  OTHER humulog insulin sliding tid with meals.  semaglutide (OZEMPIC SC) by SubCUTAneous route every seven (7) days.  fosinopril (MONOPRIL) 40 mg tablet TAKE 1 TABLET BY MOUTH ONCE DAILY (Patient taking differently: Indications: not taking) 90 Tab 2    JARDIANCE 25 mg tablet Take 25 mg by mouth daily.  ONETOUCH ULTRA2 monitoring kit       pantoprazole (PROTONIX) 40 mg tablet Take 1 Tab by mouth daily. 1 Tab 1    ONETOUCH ULTRA TEST strip       metFORMIN (GLUCOPHAGE) 1,000 mg tablet TAKE ONE TABLET BY MOUTH TWICE DAILY WITH  MEALS 180 Tab 0    TESTOSTERONE IM by IntraMUSCular route. Every 10 weeks. Aveed      multivitamin (ONE A DAY) tablet Take 1 Tab by mouth daily. No current facility-administered medications on file prior to visit. Maddi Bond. [facility] had no medications administered during this visit. Current Outpatient Medications   Medication Sig    dilTIAZem ER (Cartia XT) 240 mg capsule TAKE 1 CAPSULE BY MOUTH ONCE DAILY  Keep follow up on 10-16-20 for further refills.  fenofibrate nanocrystallized (TRICOR) 145 mg tablet Take 1 Tab by mouth daily. Keep follow up on 10-16-20 for further refills.  rosuvastatin (CRESTOR) 40 mg tablet Take 1 Tab by mouth daily. Keep follow up on 10-16-20 for further refills.  icosapent ethyL (Vascepa) 1 gram capsule Take 2 Caps by mouth two (2) times daily (with meals).     nebivoloL (Bystolic) 10 mg tablet Take 1 Tab by mouth daily.  hydroCHLOROthiazide (HYDRODIURIL) 25 mg tablet TAKE 1 TABLET BY MOUTH ONCE DAILY (keep follow up on 10-16-20)    clopidogrel (PLAVIX) 75 mg tab TAKE 1 TABLET BY MOUTH ONCE DAILY    fosinopril (MONOPRIL) 40 mg tablet TAKE 1 TABLET BY MOUTH ONCE DAILY    sildenafil, antihypertensive, (REVATIO) 20 mg tablet TAKE UP TO 5 TABS BY MOUTH AS DIRECTED    OTHER humulog insulin 20 units/24 hours continuously via insulin pump.  OTHER humulog insulin sliding tid with meals.  semaglutide (OZEMPIC SC) by SubCUTAneous route every seven (7) days.  fosinopril (MONOPRIL) 40 mg tablet TAKE 1 TABLET BY MOUTH ONCE DAILY (Patient taking differently: Indications: not taking)    JARDIANCE 25 mg tablet Take 25 mg by mouth daily.  ONETOUCH ULTRA2 monitoring kit     pantoprazole (PROTONIX) 40 mg tablet Take 1 Tab by mouth daily.  ONETOUCH ULTRA TEST strip     metFORMIN (GLUCOPHAGE) 1,000 mg tablet TAKE ONE TABLET BY MOUTH TWICE DAILY WITH  MEALS    TESTOSTERONE IM by IntraMUSCular route. Every 10 weeks. Aveed    multivitamin (ONE A DAY) tablet Take 1 Tab by mouth daily. No current facility-administered medications for this visit.           Lab Results   Component Value Date/Time    Cholesterol, total 139 09/11/2019 08:50 AM    HDL Cholesterol 36 (L) 09/11/2019 08:50 AM    LDL,Direct 80 07/10/2015 04:44 AM    LDL, calculated 50 09/11/2019 08:50 AM    VLDL, calculated 53 (H) 09/11/2019 08:50 AM    Triglyceride 266 (H) 09/11/2019 08:50 AM    CHOL/HDL Ratio 5.6 (H) 07/10/2015 04:44 AM       Lab Results   Component Value Date/Time    Sodium 142 11/18/2019 08:10 AM    Potassium 4.6 11/18/2019 08:10 AM    Chloride 106 11/18/2019 08:10 AM    CO2 30 11/18/2019 08:10 AM    Anion gap 6 11/18/2019 08:10 AM    Glucose 167 (H) 11/18/2019 08:10 AM    BUN 34 (H) 11/18/2019 08:10 AM    Creatinine 1.71 (H) 11/18/2019 08:10 AM    BUN/Creatinine ratio 20 11/18/2019 08:10 AM GFR est AA 51 (L) 11/18/2019 08:10 AM    GFR est non-AA 42 (L) 11/18/2019 08:10 AM    Calcium 9.8 11/18/2019 08:10 AM    Calcium 9.7 11/18/2019 08:10 AM       Lab Results   Component Value Date/Time    ALT (SGPT) 30 09/11/2019 08:50 AM    Alk. phosphatase 34 (L) 09/11/2019 08:50 AM    Bilirubin, direct 0.14 09/11/2019 08:50 AM    Bilirubin, total 0.3 09/11/2019 08:50 AM       Lab Results   Component Value Date/Time    WBC 6.2 11/18/2019 08:10 AM    Hemoglobin (POC) 14.6 01/24/2010 02:15 AM    HGB 15.1 11/18/2019 08:10 AM    Hematocrit (POC) 43 01/24/2010 02:15 AM    HCT 50.0 11/18/2019 08:10 AM    PLATELET 422 51/99/9804 08:10 AM    MCV 94.2 11/18/2019 08:10 AM       Lab Results   Component Value Date/Time    TSH 0.94 07/09/2015 03:15 AM         Lab Results   Component Value Date/Time    Cholesterol, total 139 09/11/2019 08:50 AM    Cholesterol, total 195 01/29/2019 08:56 AM    Cholesterol, total 151 09/15/2017 08:56 AM    Cholesterol, total 208 (H) 04/24/2017 08:34 AM    Cholesterol, total 143 02/03/2016 09:40 AM    HDL Cholesterol 36 (L) 09/11/2019 08:50 AM    HDL Cholesterol 36 (L) 01/29/2019 08:56 AM    HDL Cholesterol 39 (L) 09/15/2017 08:56 AM    HDL Cholesterol 49 04/24/2017 08:34 AM    HDL Cholesterol 50 02/03/2016 09:40 AM    LDL,Direct 80 07/10/2015 04:44 AM    LDL, calculated 50 09/11/2019 08:50 AM    LDL, calculated Comment 01/29/2019 08:56 AM    LDL, calculated 58 09/15/2017 08:56 AM    LDL, calculated 95 04/24/2017 08:34 AM    LDL, calculated 66 02/03/2016 09:40 AM    Triglyceride 266 (H) 09/11/2019 08:50 AM    Triglyceride 442 (H) 01/29/2019 08:56 AM    Triglyceride 271 (H) 09/15/2017 08:56 AM    Triglyceride 321 (H) 04/24/2017 08:34 AM    Triglyceride 137 02/03/2016 09:40 AM    CHOL/HDL Ratio 5.6 (H) 07/10/2015 04:44 AM                Please note that this dictation was completed with View [employer]w, the [employer] voice recognition software.   Quite often unanticipated grammatical, syntax, homophones, and other interpretative errors are inadvertently transcribed by the computer software. Please disregard these errors. Please excuse any errors that have escaped final proofreading.

## 2021-06-03 RX ORDER — BLOOD SUGAR DIAGNOSTIC
STRIP MISCELLANEOUS
Qty: 200 STRIP | Refills: 0 | Status: SHIPPED | OUTPATIENT
Start: 2021-06-03 | End: 2021-09-15

## 2021-06-16 DIAGNOSIS — E10.9 TYPE 1 DIABETES MELLITUS WITHOUT COMPLICATION (H): ICD-10-CM

## 2021-06-16 NOTE — TELEPHONE ENCOUNTER
"Last Written Prescription Date:  3/24/2021  Last Fill Quantity: 200,  # refills: 0   Last office visit: Visit date not found with prescribing provider:  2/14/2020   Future Office Visit:   Next 5 appointments (look out 90 days)    Jul 28, 2021  9:30 AM  Return Visit with Óscar Verdugo MD  [facility] ([facility] ) [address] [place] [id number] [phone]         Requested Prescriptions   Pending Prescriptions Disp Refills     insulin syringe-needle U-100 (BD INSULIN SYRINGE U/F) 30G X 1/2\" 0.5 ML miscellaneous 200 each 0     Sig: USE ONE SYRINGE 4 TIMES A DAY       There is no refill protocol information for this order            "

## 2021-06-17 RX ORDER — PEN NEEDLE, DIABETIC 29 G X1/2"
NEEDLE, DISPOSABLE MISCELLANEOUS
Qty: 200 EACH | Refills: 0 | Status: SHIPPED | OUTPATIENT
Start: 2021-06-17 | End: 2022-09-07

## 2021-07-28 ENCOUNTER — OFFICE VISIT (OUTPATIENT)
Dept: ENDOCRINOLOGY | Facility: CLINIC | Age: 36
End: 2021-07-28
Payer: COMMERCIAL

## 2021-07-28 VITALS
WEIGHT: 176.1 LBS | DIASTOLIC BLOOD PRESSURE: 72 MMHG | BODY MASS INDEX: 26.69 KG/M2 | OXYGEN SATURATION: 99 % | SYSTOLIC BLOOD PRESSURE: 120 MMHG | HEART RATE: 98 BPM | HEIGHT: 68 IN

## 2021-07-28 DIAGNOSIS — E10.65 UNCONTROLLED TYPE 1 DIABETES MELLITUS WITH HYPERGLYCEMIA (H): Primary | ICD-10-CM

## 2021-07-28 PROCEDURE — 95250 CONT GLUC MNTR PHYS/QHP EQP: CPT | Performed by: INTERNAL MEDICINE

## 2021-07-28 PROCEDURE — 99214 OFFICE O/P EST MOD 30 MIN: CPT | Performed by: INTERNAL MEDICINE

## 2021-07-28 ASSESSMENT — MIFFLIN-ST. JEOR: SCORE: 1698.28

## 2021-07-28 NOTE — LETTER
7/28/2021         RE: Olman Newby  [address] [place] [address]        Dear Colleague,    Thank you for referring your patient, Olman Newby, to the [facility] [place]. Please see a copy of my visit note below.      Recent issues:  Diabetes follow-up evaluation, last appt 2/14/20, here with friend Kim  Previously completed chemical dependency outpatient treatment at [facility] in [place] in 1/2020  Recent hospitalization for abdominal pain  Feeling pretty well overall, no recent health concerns          Previous diagnosis of diabetes mellitus  Details of initial diagnosis, lab testing, and management not available.  Treatment with insulin injections  Using base-dose rapid acting insulin and daily long-acting insulin MDI plan  Infrequent BG testing, chronic higher hgbA1c levels in the 7.5-8.5% range overall  Uses vial/syringe with insulin dosing, but sometimes postmeal Nv dosing  Current DM meds:  Novolog  10U at mealtimes    Guestimates Nv sscale  Levemir 15U at bedtime (syringe use from the pen)    Using Verio BG meter, previous variable testing   Recently tests 4x/day, trends 55- 260 mg/dl, avg 202 mg/dl   Good hypoglycemia awareness    Previous [facility] labs include:      Previous [facility] labs include:  Lab Results   Component Value Date    A1C 7.9 (H) 02/14/2020     02/14/2020    POTASSIUM 3.8 02/14/2020    CHLORIDE 102 02/14/2020    CO2 31 02/14/2020    ANIONGAP 4 02/14/2020     (H) 02/14/2020    BUN 9 02/14/2020    CR 0.71 02/14/2020    GFRESTIMATED >90 02/14/2020    GFRESTBLACK >90 02/14/2020    JOHN 8.6 02/14/2020    UCRR 69 10/23/2018    MICROL <5 10/23/2018    UMALCR Unable to calculate due to low value 10/23/2018     Lab Results   Component Value Date    TSH 1.34 02/14/2020     History of substance abuse with alcohol, oral metamphetamines  Last eye exam 2018, no reported DR per patient  DM Complications:  None known      Has lived with his  parents, works at [employer] in [employer]; daughter Fabien age 16  Sees Dr. Diane Brice/[facility] [place] for general medicine evaluations.    PMH/PSH:  Past Medical History:   Diagnosis Date     Anxiety      Depression      Hypoglycemia      Substance abuse (H)      Type 1 diabetes (H)      No past surgical history on file.    Family Hx:  No family history on file.      Social Hx:  Social History     Socioeconomic History     Marital status: Single     Spouse name: Not on file     Number of children: Not on file     Years of education: Not on file     Highest education level: Not on file   Occupational History     Not on file   Tobacco Use     Smoking status: Current Every Day Smoker     Packs/day: 0.50     Smokeless tobacco: Never Used   Substance and Sexual Activity     Alcohol use: Yes     Comment: occ     Drug use: Yes     Types: Methamphetamines     Sexual activity: Not on file   Other Topics Concern     Not on file   Social History Narrative     Not on file     Social Determinants of Health     Financial Resource Strain:      Difficulty of Paying Living Expenses:    Food Insecurity:      Worried About Running Out of Food in the Last Year:      Ran Out of Food in the Last Year:    Transportation Needs:      Lack of Transportation (Medical):      Lack of Transportation (Non-Medical):    Physical Activity:      Days of Exercise per Week:      Minutes of Exercise per Session:    Stress:      Feeling of Stress :    Social Connections:      Frequency of Communication with Friends and Family:      Frequency of Social Gatherings with Friends and Family:      Attends Protestant Services:      Active Member of Clubs or Organizations:      Attends Club or Organization Meetings:      Marital Status:    Intimate Partner Violence:      Fear of Current or Ex-Partner:      Emotionally Abused:      Physically Abused:      Sexually Abused:           MEDICATIONS:  has a current medication list which includes the  "following prescription(s): aspirin, accu-chek christine plus, blood glucose calibration, insulin aspart, insulin glargine, bd insulin syringe u/f, onetouch verio iq, insulin regular, and insulin syringe-needle u-100.    ROS:     ROS: 10 point ROS neg other than the symptoms noted above in the HPI.    GENERAL: energy good, no weight changes; denies fevers, chills, night sweats.   HEENT: no dysphagia, odonophagia, diplopia, neck pain  THYROID:  no apparent hyper or hypothyroid symptoms  CV: no chest pain, pressure, palpitations  LUNGS: no SOB, BRYAN, cough, wheezing   ABDOMEN: no diarrhea, constipation, abdominal pain  EXTREMITIES: no rashes, ulcers, edema  NEUROLOGY: no headaches, denies changes in vision, tingling, extremitiy numbness   MSK: no muscle aches or pains, weakness  SKIN: no rashes or lesions  PSYCH:  stable mood, no significant anxiety or depression  ENDOCRINE: no heat or cold intolerance    Physical Exam   VS: /72   Pulse 98   Ht 1.727 m (5' 8\")   Wt 79.9 kg (176 lb 1.6 oz)   SpO2 99%   BMI 26.78 kg/m    GENERAL: AXOX3, NAD, anxious, slender, well dressed, answering questions appropriately, appears stated age.  THYROID:  normal gland, no apparent nodules or goiter  ABDOMEN: soft, nontender, nondistended  EXTREMITIES: no ankle edema  NEUROLOGY: CN grossly intact, no tremors  MSK: grossly intact  SKIN: scalp balding; no skin rashes or lesions noted    LABS:    All pertinent notes, labs, and images personally reviewed by me.     A/P:  Encounter Diagnosis   Name Primary?     Uncontrolled type 1 diabetes mellitus with hyperglycemia (H) Yes       Comments:  Reviewed health history and diabetes management.  Difficult to assess overall glucose control with limited insulin dose information  Reviewed and interpreted tests that I previously ordered.   Ordered appropriate tests for the endocrinology disease management.    Management options discussed and implemented after shared medical decision making with " the patient.  T1DM problem is chronic with exacerbation progression, hyperglycemia    Plan:  Discussed general issues with the diabetes diagnosis and management  We discussed the hgbA1c test which reflects previous overall glucose levels or control  Discussed the importance of blood glucose (BG) testing to assess glucose trends  Provided general overview of the multiple daily injection (MDI) plan using rapid acting mealtime and longacting insulin medications    Recommend:  Continue current Levemir and Novolog MDI insulin injection plan  Avoid missing doses  Try to use more precise Novolog correction scale as:    mg/dl  No correction   151-200 mg/dl  +1 units   201-250 mg/dl  +2 units   251-300 mg/dl  +3 units   301-350 mg/dl  +4 units   350-400 mg/dl  +5 units   401-450 mg/dl  +6 units and call physician    Would benefit from learning, using insulin-to-carb counting (I:C) method for mealtime insulin dosing.  No labs ordered today    Advised checking glucose levels premeal and bedtime  Discussed option of the Freestyle Libre2 CGM sensor, he finally agreed  Discussed option of wearing the Freestyle Noam Personal CGMS sensor and patient agreed.         Provided overview of the sensor insertion, use of [place] or smart phone eduin, activation step, and scanning process       While receiving instruction from me, patient placed sensor on left upper arm and activated sensor       Plan to scan sensor after the initial 60-min warm up phase, scan 3-4x/day or when curious of sensor glucose level       Plan to remove sensor in 14 days, also remove if significant local skin reaction or if dislodged or if unhappy with it.  Contact our office with feedback on Noam use or if interested in Noam sensor Rx  Advise having fasting lipid panel testing and dilated eye examination, at least annually  Be on-time for endocrinology appointments    Reminded him to abstain from methamphetamine and other illegal drug use  Encouraged him to  participate with local AA program  Reminded patient of the importance of getting the COVID-19 vaccination ASAP  Addressed patient questions today    There are no Patient Instructions on file for this visit.    Future labs ordered today:   Orders Placed This Encounter   Procedures     GLUCOSE MONITORING CONTINUOUS, >=72 HR     Radiology/Consults ordered today: None    Total time spent in with the patient evaluation:  25 min  Additional time spent reviewing pertinent lab tests and chart notes, and documentation:  10 min    Follow-up:  11/2021    RON Verdugo MD, MS  Endocrinology  [facility]    CC:  MIMI Brice                   Again, thank you for allowing me to participate in the care of your patient.        Sincerely,        Óscar Verdugo MD

## 2021-07-28 NOTE — PROGRESS NOTES
Recent issues:  Diabetes follow-up evaluation, last appt 2/14/20, here with friend Kim  Previously completed chemical dependency outpatient treatment at [facility] in [place] in 1/2020  Recent hospitalization for abdominal pain  Feeling pretty well overall, no recent health concerns          Previous diagnosis of diabetes mellitus  Details of initial diagnosis, lab testing, and management not available.  Treatment with insulin injections  Using base-dose rapid acting insulin and daily long-acting insulin MDI plan  Infrequent BG testing, chronic higher hgbA1c levels in the 7.5-8.5% range overall  Uses vial/syringe with insulin dosing, but sometimes postmeal Nv dosing  Current DM meds:  Novolog  10U at mealtimes    Guestimates Nv sscale  Levemir 15U at bedtime (syringe use from the pen)    Using Verio BG meter, previous variable testing   Recently tests 4x/day, trends 55- 260 mg/dl, avg 202 mg/dl   Good hypoglycemia awareness    Previous [facility] labs include:      Previous [facility] labs include:  Lab Results   Component Value Date    A1C 7.9 (H) 02/14/2020     02/14/2020    POTASSIUM 3.8 02/14/2020    CHLORIDE 102 02/14/2020    CO2 31 02/14/2020    ANIONGAP 4 02/14/2020     (H) 02/14/2020    BUN 9 02/14/2020    CR 0.71 02/14/2020    GFRESTIMATED >90 02/14/2020    GFRESTBLACK >90 02/14/2020    JOHN 8.6 02/14/2020    UCRR 69 10/23/2018    MICROL <5 10/23/2018    UMALCR Unable to calculate due to low value 10/23/2018     Lab Results   Component Value Date    TSH 1.34 02/14/2020     History of substance abuse with alcohol, oral metamphetamines  Last eye exam 2018, no reported DR per patient  DM Complications:  None known      Has lived with his parents, works at [employer] in [employer]; daughter Fabien age 16  Sees Dr. Diane Brice/[facility] [place] for general medicine evaluations.    PMH/PSH:  Past Medical History:   Diagnosis Date     Anxiety      Depression      Hypoglycemia      Substance abuse  (H)      Type 1 diabetes (H)      No past surgical history on file.    Family Hx:  No family history on file.      Social Hx:  Social History     Socioeconomic History     Marital status: Single     Spouse name: Not on file     Number of children: Not on file     Years of education: Not on file     Highest education level: Not on file   Occupational History     Not on file   Tobacco Use     Smoking status: Current Every Day Smoker     Packs/day: 0.50     Smokeless tobacco: Never Used   Substance and Sexual Activity     Alcohol use: Yes     Comment: occ     Drug use: Yes     Types: Methamphetamines     Sexual activity: Not on file   Other Topics Concern     Not on file   Social History Narrative     Not on file     Social Determinants of Health     Financial Resource Strain:      Difficulty of Paying Living Expenses:    Food Insecurity:      Worried About Running Out of Food in the Last Year:      Ran Out of Food in the Last Year:    Transportation Needs:      Lack of Transportation (Medical):      Lack of Transportation (Non-Medical):    Physical Activity:      Days of Exercise per Week:      Minutes of Exercise per Session:    Stress:      Feeling of Stress :    Social Connections:      Frequency of Communication with Friends and Family:      Frequency of Social Gatherings with Friends and Family:      Attends Jewish Services:      Active Member of Clubs or Organizations:      Attends Club or Organization Meetings:      Marital Status:    Intimate Partner Violence:      Fear of Current or Ex-Partner:      Emotionally Abused:      Physically Abused:      Sexually Abused:           MEDICATIONS:  has a current medication list which includes the following prescription(s): aspirin, accu-chek christine plus, blood glucose calibration, insulin aspart, insulin glargine, bd insulin syringe u/f, onetouch verio iq, insulin regular, and insulin syringe-needle u-100.    ROS:     ROS: 10 point ROS neg other than the symptoms  "noted above in the HPI.    GENERAL: energy good, no weight changes; denies fevers, chills, night sweats.   HEENT: no dysphagia, odonophagia, diplopia, neck pain  THYROID:  no apparent hyper or hypothyroid symptoms  CV: no chest pain, pressure, palpitations  LUNGS: no SOB, BRYAN, cough, wheezing   ABDOMEN: no diarrhea, constipation, abdominal pain  EXTREMITIES: no rashes, ulcers, edema  NEUROLOGY: no headaches, denies changes in vision, tingling, extremitiy numbness   MSK: no muscle aches or pains, weakness  SKIN: no rashes or lesions  PSYCH:  stable mood, no significant anxiety or depression  ENDOCRINE: no heat or cold intolerance    Physical Exam   VS: /72   Pulse 98   Ht 1.727 m (5' 8\")   Wt 79.9 kg (176 lb 1.6 oz)   SpO2 99%   BMI 26.78 kg/m    GENERAL: AXOX3, NAD, anxious, slender, well dressed, answering questions appropriately, appears stated age.  THYROID:  normal gland, no apparent nodules or goiter  ABDOMEN: soft, nontender, nondistended  EXTREMITIES: no ankle edema  NEUROLOGY: CN grossly intact, no tremors  MSK: grossly intact  SKIN: scalp balding; no skin rashes or lesions noted    LABS:    All pertinent notes, labs, and images personally reviewed by me.     A/P:  Encounter Diagnosis   Name Primary?     Uncontrolled type 1 diabetes mellitus with hyperglycemia (H) Yes       Comments:  Reviewed health history and diabetes management.  Difficult to assess overall glucose control with limited insulin dose information  Reviewed and interpreted tests that I previously ordered.   Ordered appropriate tests for the endocrinology disease management.    Management options discussed and implemented after shared medical decision making with the patient.  T1DM problem is chronic with exacerbation progression, hyperglycemia    Plan:  Discussed general issues with the diabetes diagnosis and management  We discussed the hgbA1c test which reflects previous overall glucose levels or control  Discussed the " importance of blood glucose (BG) testing to assess glucose trends  Provided general overview of the multiple daily injection (MDI) plan using rapid acting mealtime and longacting insulin medications    Recommend:  Continue current Levemir and Novolog MDI insulin injection plan  Avoid missing doses  Try to use more precise Novolog correction scale as:    mg/dl  No correction   151-200 mg/dl  +1 units   201-250 mg/dl  +2 units   251-300 mg/dl  +3 units   301-350 mg/dl  +4 units   350-400 mg/dl  +5 units   401-450 mg/dl  +6 units and call physician    Would benefit from learning, using insulin-to-carb counting (I:C) method for mealtime insulin dosing.  No labs ordered today    Advised checking glucose levels premeal and bedtime  Discussed option of the Freestyle Libre2 CGM sensor, he finally agreed  Discussed option of wearing the Freestyle Noam Personal CGMS sensor and patient agreed.         Provided overview of the sensor insertion, use of [place] or smart phone eduin, activation step, and scanning process       While receiving instruction from me, patient placed sensor on left upper arm and activated sensor       Plan to scan sensor after the initial 60-min warm up phase, scan 3-4x/day or when curious of sensor glucose level       Plan to remove sensor in 14 days, also remove if significant local skin reaction or if dislodged or if unhappy with it.  Contact our office with feedback on Noam use or if interested in Noam sensor Rx  Advise having fasting lipid panel testing and dilated eye examination, at least annually  Be on-time for endocrinology appointments    Reminded him to abstain from methamphetamine and other illegal drug use  Encouraged him to participate with local AA program  Reminded patient of the importance of getting the COVID-19 vaccination ASAP  Addressed patient questions today    There are no Patient Instructions on file for this visit.    Future labs ordered today:   Orders Placed This  Encounter   Procedures     GLUCOSE MONITORING CONTINUOUS, >=72 HR     Radiology/Consults ordered today: None    Total time spent in with the patient evaluation:  25 min  Additional time spent reviewing pertinent lab tests and chart notes, and documentation:  10 min    Follow-up:  11/2021    RON Verdugo MD, MS  Endocrinology  [facility]    CC:  MIMI Brice

## 2021-07-31 DIAGNOSIS — E10.9 TYPE 1 DIABETES MELLITUS WITHOUT COMPLICATION (H): ICD-10-CM

## 2021-08-01 DIAGNOSIS — E10.9 TYPE 1 DIABETES MELLITUS WITHOUT COMPLICATION (H): ICD-10-CM

## 2021-09-15 DIAGNOSIS — E10.9 TYPE 1 DIABETES MELLITUS WITHOUT COMPLICATION (H): ICD-10-CM

## 2021-09-15 RX ORDER — BLOOD SUGAR DIAGNOSTIC
STRIP MISCELLANEOUS
Qty: 200 STRIP | Refills: 1 | Status: SHIPPED | OUTPATIENT
Start: 2021-09-15 | End: 2022-01-11 | Stop reason: ALTCHOICE

## 2021-10-05 DIAGNOSIS — E10.9 TYPE 1 DIABETES MELLITUS WITHOUT COMPLICATION (H): ICD-10-CM

## 2021-10-06 NOTE — TELEPHONE ENCOUNTER
Last Written Prescription Date:  8/2/21  Last Fill Quantity: 20ml,  # refills: 3   Last office visit: 7/28/2021 with prescribing provider:  Dr. Verdugo    Future Office Visit:   Next 5 appointments (look out 90 days)    Oct 28, 2021  9:30 AM  Return Visit with Óscar Verdugo MD  [facility] ([facility] ) [address] [place] [id number] [phone]

## 2021-11-11 DIAGNOSIS — E10.9 TYPE 1 DIABETES MELLITUS WITHOUT COMPLICATION (H): ICD-10-CM

## 2021-11-11 RX ORDER — PEN NEEDLE, DIABETIC 29 G X1/2"
NEEDLE, DISPOSABLE MISCELLANEOUS
Qty: 200 EACH | Refills: 1 | Status: SHIPPED | OUTPATIENT
Start: 2021-11-11 | End: 2022-05-02

## 2021-11-12 NOTE — TELEPHONE ENCOUNTER
Notified pt to call back and make an appt in regards to follow up . This message was relayed to pt family family member.Elis Love RN

## 2021-11-12 NOTE — TELEPHONE ENCOUNTER
Novolog vial Rx approved #2 vials, no refills.  Patient overdue for follow-up evaluation.  Please offer video visit appointment (ROSALIO slot) in next 3 months, thanks.    RON Verdugo MD, MS  Endocrinology  [facility]

## 2022-01-03 DIAGNOSIS — E10.9 TYPE 1 DIABETES MELLITUS WITHOUT COMPLICATION (H): ICD-10-CM

## 2022-01-06 ENCOUNTER — VIRTUAL VISIT (OUTPATIENT)
Dept: ENDOCRINOLOGY | Facility: CLINIC | Age: 37
End: 2022-01-06
Payer: COMMERCIAL

## 2022-01-06 VITALS — WEIGHT: 180 LBS | HEIGHT: 68 IN | BODY MASS INDEX: 27.28 KG/M2

## 2022-01-06 DIAGNOSIS — Z53.9 ERRONEOUS ENCOUNTER--DISREGARD: Primary | ICD-10-CM

## 2022-01-06 ASSESSMENT — MIFFLIN-ST. JEOR: SCORE: 1710.97

## 2022-01-06 NOTE — NURSING NOTE
"Chief Complaint   Patient presents with     Follow Up     Diabetes       Vitals:    01/06/22 1244   Weight: 81.6 kg (180 lb)   Height: 1.727 m (5' 8\")       Body mass index is 27.37 kg/m .      Kathleen Anand MA    "

## 2022-01-06 NOTE — LETTER
1/6/2022         RE: Olman Newby  [address] [place] [address]        Dear Colleague,    Thank you for referring your patient, Olman Newby, to the [facility] [place]. Please see a copy of my visit note below.    Patient had video visit planned for today but unable to do video link despite several text invites.  Appointment cancelled  Will need office appointment soon.    RON Verdugo MD, MS  Endocrinology  [facility]      .      Again, thank you for allowing me to participate in the care of your patient.        Sincerely,        Óscar Verdugo MD

## 2022-01-06 NOTE — PROGRESS NOTES
Patient had video visit planned for today but unable to do video link despite several text invites.  Appointment cancelled  Will need office appointment soon.    RON Verdugo MD, MS  Endocrinology  [facility]      .

## 2022-01-11 ENCOUNTER — OFFICE VISIT (OUTPATIENT)
Dept: ENDOCRINOLOGY | Facility: CLINIC | Age: 37
End: 2022-01-11
Payer: COMMERCIAL

## 2022-01-11 VITALS
BODY MASS INDEX: 27.08 KG/M2 | DIASTOLIC BLOOD PRESSURE: 69 MMHG | SYSTOLIC BLOOD PRESSURE: 106 MMHG | HEART RATE: 88 BPM | WEIGHT: 178.7 LBS | HEIGHT: 68 IN

## 2022-01-11 DIAGNOSIS — E10.9 TYPE 1 DIABETES MELLITUS WITHOUT COMPLICATION (H): Primary | ICD-10-CM

## 2022-01-11 PROCEDURE — 99214 OFFICE O/P EST MOD 30 MIN: CPT | Performed by: INTERNAL MEDICINE

## 2022-01-11 ASSESSMENT — MIFFLIN-ST. JEOR: SCORE: 1705.08

## 2022-01-11 NOTE — LETTER
1/11/2022         RE: Olman Newby  [address] [place] [address]        Dear Colleague,    Thank you for referring your patient, Olman Newby, to the [facility] [place]. Please see a copy of my visit note below.      Recent issues:  Diabetes follow-up evaluation  Previously completed chemical dependency outpatient treatment at [facility] in [place] in 1/2020  Feeling pretty well overall, no recent health concerns        Previous diagnosis of diabetes mellitus  Details of initial diagnosis, lab testing, and management not available.  Treatment with insulin injections  Using base-dose rapid acting insulin and daily long-acting insulin MDI plan  Infrequent BG testing, chronic higher hgbA1c levels in the 7.5-8.5% range overall  Uses vial/syringe with insulin dosing, but sometimes postmeal Nv dosing  Changed Levemir to Lantus vial insulin    Current DM meds:   Novolog  9-10U at mealtimes    Guestimates Nv sscale  Lantus 14U  Subcutaneous bedtime    Using Verio BG meter, previous variable testing   Recently tests 4x/day   Recent 30d avg 190 mg/dl   Good hypoglycemia awareness    Previous [facility] labs include:      Previous [facility] labs include:  Lab Results   Component Value Date    A1C 7.9 (H) 02/14/2020     02/14/2020    POTASSIUM 3.8 02/14/2020    CHLORIDE 102 02/14/2020    CO2 31 02/14/2020    ANIONGAP 4 02/14/2020     (H) 02/14/2020    BUN 9 02/14/2020    CR 0.71 02/14/2020    GFRESTIMATED >90 02/14/2020    GFRESTBLACK >90 02/14/2020    JOHN 8.6 02/14/2020    UCRR 69 10/23/2018    MICROL <5 10/23/2018    UMALCR Unable to calculate due to low value 10/23/2018     Lab Results   Component Value Date    TSH 1.34 02/14/2020     History of substance abuse with alcohol, oral metamphetamines, still using both  Last eye exam 2018, no reported DR per patient  DM Complications:  None known      Has lived with his parents and girlfriend Kim; works at [employer]  in [facility] [facility] [place]; daughter Fabien age 16  Sees Dr. Diane Brice/[facility] [place] for general medicine evaluations.    PMH/PSH:  Past Medical History:   Diagnosis Date     Anxiety      Depression      Hypoglycemia      Substance abuse (H)      Type 1 diabetes (H)      No past surgical history on file.    Family Hx:  No family history on file.      Social Hx:  Social History     Socioeconomic History     Marital status: Single     Spouse name: Not on file     Number of children: Not on file     Years of education: Not on file     Highest education level: Not on file   Occupational History     Not on file   Tobacco Use     Smoking status: Current Every Day Smoker     Packs/day: 0.50     Smokeless tobacco: Never Used   Substance and Sexual Activity     Alcohol use: Not Currently     Drug use: Yes     Types: Methamphetamines     Sexual activity: Not on file   Other Topics Concern     Not on file   Social History Narrative     Not on file     Social Determinants of Health     Financial Resource Strain: Not on file   Food Insecurity: Not on file   Transportation Needs: Not on file   Physical Activity: Not on file   Stress: Not on file   Social Connections: Not on file   Intimate Partner Violence: Not on file   Housing Stability: Not on file          MEDICATIONS:  has a current medication list which includes the following prescription(s): aspirin, blood glucose, freestyle anton 2 sensor, insulin aspart, insulin glargine, bd insulin syringe u/f, blood glucose calibration, insulin aspart, insulin aspart, insulin regular, bd insulin syringe u/f, and insulin syringe-needle u-100.    ROS:     ROS: 10 point ROS neg other than the symptoms noted above in the HPI.    GENERAL: energy good, no weight changes; denies fevers, chills, night sweats.   HEENT: no dysphagia, odonophagia, diplopia, neck pain  THYROID:  no apparent hyper or hypothyroid symptoms  CV: no chest pain, pressure, palpitations  LUNGS: no SOB, BRYAN, cough, wheezing  "  ABDOMEN: no diarrhea, constipation, abdominal pain  EXTREMITIES: no rashes, ulcers, edema  NEUROLOGY: no headaches, denies changes in vision, tingling, extremitiy numbness   MSK: no muscle aches or pains, weakness  SKIN: no rashes or lesions  PSYCH:  stable mood, no significant anxiety or depression  ENDOCRINE: no heat or cold intolerance      Physical Exam   VS: /69   Pulse 88   Ht 1.727 m (5' 8\")   Wt 81.1 kg (178 lb 11.2 oz)   BMI 27.17 kg/m    GENERAL: AXOX3, NAD, well dressed, answering questions appropriately, appears stated age.  ENT: no nose swelling or nasal discharge, mouth redness or gum changes.  EYES: eyes grossly normal to inspection, conjunctivae and sclerae normal, no exophthalmos or proptosis  THYROID:  no apparent nodules or goiter  LUNGS: no audible wheeze, cough or visible cyanosis, or increased work of breathing  ABDOMEN: abdomen normal size  EXTREMITIES: no edema noted  NEUROLOGY: CN grossly intact, no tremors  MSK: grossly intact  SKIN:  no apparent skin lesions, rash, or edema with visualized skin appearance  PSYCH: mentation appears normal, affect normal/bright, judgement and insight intact,   normal speech and appearance well groomed      LABS:    All pertinent notes, labs, and images personally reviewed by me.     A/P:  Encounter Diagnosis   Name Primary?     Type 1 diabetes mellitus without complication (H) Yes       Comments:  Reviewed health history and diabetes management.  Difficult to assess overall glucose control with limited insulin dose information  Reviewed and interpreted tests that I previously ordered.   Ordered appropriate tests for the endocrinology disease management.    Management options discussed and implemented after shared medical decision making with the patient.  T1DM problem is chronic with exacerbation progression, hyperglycemia    Plan:  Discussed general issues with the diabetes diagnosis and management  We discussed the hgbA1c test which reflects " previous overall glucose levels or control  Discussed the importance of blood glucose (BG) testing to assess glucose trends  Provided general overview of the multiple daily injection (MDI) plan using rapid acting mealtime and longacting insulin medications    Recommend:  Continue current Lantus and Novolog MDI insulin injection plan  Avoid missing doses  Try to use more precise Novolog correction scale as:    mg/dl  No correction   151-200 mg/dl  +1 units   201-250 mg/dl  +2 units   251-300 mg/dl  +3 units   301-350 mg/dl  +4 units   350-400 mg/dl  +5 units   401-450 mg/dl  +6 units and call physician    Would benefit from learning, using insulin-to-carb counting (I:C) method for mealtime insulin dosing.  Lab testing:   No labs ordered today, he doesn't have time   Plan preappt labs in 5/2022    Advised checking glucose levels premeal and bedtime  Resume use of the Freestyle Libre2 CGM sensor   New Rx sent to his pharmacy   Watch YouTube video for instruction, contact us if questions  Advise having fasting lipid panel testing and dilated eye examination, at least annually    Reminded him to abstain from methamphetamine and other illegal drug use  Encouraged him to participate with local AA program  Addressed patient questions today    Patient Instructions   Diabetes medication plan:  Novolog    9-10U at mealtimes  Novolog correction scale for blood glucose levels >150 mg/dl:      mg/dl  No correction   151-200 mg/dl  +1 units   201-250 mg/dl  +2 units   251-300 mg/dl  +3 units   301-350 mg/dl  +4 units   350-400 mg/dl  +5 units   401-450 mg/dl  +6 units and call physician    Lantus 14U    Subcutaneous bedtime    Check glucose levels before meals and bedtime  New Accu-check Guide test strip Rx sent to pharmacy    Restart Freestyle Libre2 glucose sensor   Scan sensor before meals and bedtime   Use glucose level for insulin correction scale   Watch YouTube video for instructions   New Libre2 sensor Rx sent  to pharmacy    Schedule eye exam at eye clinic soon   Request report faxed to our office    Plan fasting lab tests in 5/2022   Arrange lab appt at [facility]    Arrange a follow-up clinic appointment with me in 5/2022   My office located at the [facility]  Address:  [address] [place] [address], [address], [place], [place]  Endocrinology appointments on Mon, Tues, Wed, and Thursdays.      Future labs ordered today: No orders of the defined types were placed in this encounter.    Radiology/Consults ordered today: None    Total time spent in with the patient evaluation:  28 min  Additional time spent reviewing pertinent lab tests and chart notes, and documentation:  7 min    Follow-up:  5/2022    RON Verdugo MD, MS  Endocrinology  [facility]                       Again, thank you for allowing me to participate in the care of your patient.        Sincerely,        Óscar Verdugo MD

## 2022-01-11 NOTE — PROGRESS NOTES
Recent issues:  Diabetes follow-up evaluation  Previously completed chemical dependency outpatient treatment at [facility] in [place] in 1/2020  Feeling pretty well overall, no recent health concerns        Previous diagnosis of diabetes mellitus  Details of initial diagnosis, lab testing, and management not available.  Treatment with insulin injections  Using base-dose rapid acting insulin and daily long-acting insulin MDI plan  Infrequent BG testing, chronic higher hgbA1c levels in the 7.5-8.5% range overall  Uses vial/syringe with insulin dosing, but sometimes postmeal Nv dosing  Changed Levemir to Lantus vial insulin    Current DM meds:   Novolog  9-10U at mealtimes    Guestimates Nv sscale  Lantus 14U  Subcutaneous bedtime    Using Verio BG meter, previous variable testing   Recently tests 4x/day   Recent 30d avg 190 mg/dl   Good hypoglycemia awareness    Previous [facility] labs include:      Previous [facility] labs include:  Lab Results   Component Value Date    A1C 7.9 (H) 02/14/2020     02/14/2020    POTASSIUM 3.8 02/14/2020    CHLORIDE 102 02/14/2020    CO2 31 02/14/2020    ANIONGAP 4 02/14/2020     (H) 02/14/2020    BUN 9 02/14/2020    CR 0.71 02/14/2020    GFRESTIMATED >90 02/14/2020    GFRESTBLACK >90 02/14/2020    JOHN 8.6 02/14/2020    UCRR 69 10/23/2018    MICROL <5 10/23/2018    UMALCR Unable to calculate due to low value 10/23/2018     Lab Results   Component Value Date    TSH 1.34 02/14/2020     History of substance abuse with alcohol, oral metamphetamines, still using both  Last eye exam 2018, no reported DR per patient  DM Complications:  None known      Has lived with his parents and girlfriend Kim; works at [employer] [place] [employer]; daughter Fabien age 16  Sees Dr. Diane Brice/[facility] [place] for general medicine evaluations.    PMH/PSH:  Past Medical History:   Diagnosis Date     Anxiety      Depression      Hypoglycemia      Substance abuse (H)      Type 1 diabetes (H)       No past surgical history on file.    Family Hx:  No family history on file.      Social Hx:  Social History     Socioeconomic History     Marital status: Single     Spouse name: Not on file     Number of children: Not on file     Years of education: Not on file     Highest education level: Not on file   Occupational History     Not on file   Tobacco Use     Smoking status: Current Every Day Smoker     Packs/day: 0.50     Smokeless tobacco: Never Used   Substance and Sexual Activity     Alcohol use: Not Currently     Drug use: Yes     Types: Methamphetamines     Sexual activity: Not on file   Other Topics Concern     Not on file   Social History Narrative     Not on file     Social Determinants of Health     Financial Resource Strain: Not on file   Food Insecurity: Not on file   Transportation Needs: Not on file   Physical Activity: Not on file   Stress: Not on file   Social Connections: Not on file   Intimate Partner Violence: Not on file   Housing Stability: Not on file          MEDICATIONS:  has a current medication list which includes the following prescription(s): aspirin, blood glucose, freestyle anton 2 sensor, insulin aspart, insulin glargine, bd insulin syringe u/f, blood glucose calibration, insulin aspart, insulin aspart, insulin regular, bd insulin syringe u/f, and insulin syringe-needle u-100.    ROS:     ROS: 10 point ROS neg other than the symptoms noted above in the HPI.    GENERAL: energy good, no weight changes; denies fevers, chills, night sweats.   HEENT: no dysphagia, odonophagia, diplopia, neck pain  THYROID:  no apparent hyper or hypothyroid symptoms  CV: no chest pain, pressure, palpitations  LUNGS: no SOB, BRYAN, cough, wheezing   ABDOMEN: no diarrhea, constipation, abdominal pain  EXTREMITIES: no rashes, ulcers, edema  NEUROLOGY: no headaches, denies changes in vision, tingling, extremitiy numbness   MSK: no muscle aches or pains, weakness  SKIN: no rashes or lesions  PSYCH:  stable mood,  "no significant anxiety or depression  ENDOCRINE: no heat or cold intolerance      Physical Exam   VS: /69   Pulse 88   Ht 1.727 m (5' 8\")   Wt 81.1 kg (178 lb 11.2 oz)   BMI 27.17 kg/m    GENERAL: AXOX3, NAD, well dressed, answering questions appropriately, appears stated age.  ENT: no nose swelling or nasal discharge, mouth redness or gum changes.  EYES: eyes grossly normal to inspection, conjunctivae and sclerae normal, no exophthalmos or proptosis  THYROID:  no apparent nodules or goiter  LUNGS: no audible wheeze, cough or visible cyanosis, or increased work of breathing  ABDOMEN: abdomen normal size  EXTREMITIES: no edema noted  NEUROLOGY: CN grossly intact, no tremors  MSK: grossly intact  SKIN:  no apparent skin lesions, rash, or edema with visualized skin appearance  PSYCH: mentation appears normal, affect normal/bright, judgement and insight intact,   normal speech and appearance well groomed      LABS:    All pertinent notes, labs, and images personally reviewed by me.     A/P:  Encounter Diagnosis   Name Primary?     Type 1 diabetes mellitus without complication (H) Yes       Comments:  Reviewed health history and diabetes management.  Difficult to assess overall glucose control with limited insulin dose information  Reviewed and interpreted tests that I previously ordered.   Ordered appropriate tests for the endocrinology disease management.    Management options discussed and implemented after shared medical decision making with the patient.  T1DM problem is chronic with exacerbation progression, hyperglycemia    Plan:  Discussed general issues with the diabetes diagnosis and management  We discussed the hgbA1c test which reflects previous overall glucose levels or control  Discussed the importance of blood glucose (BG) testing to assess glucose trends  Provided general overview of the multiple daily injection (MDI) plan using rapid acting mealtime and longacting insulin " medications    Recommend:  Continue current Lantus and Novolog MDI insulin injection plan  Avoid missing doses  Try to use more precise Novolog correction scale as:    mg/dl  No correction   151-200 mg/dl  +1 units   201-250 mg/dl  +2 units   251-300 mg/dl  +3 units   301-350 mg/dl  +4 units   350-400 mg/dl  +5 units   401-450 mg/dl  +6 units and call physician    Would benefit from learning, using insulin-to-carb counting (I:C) method for mealtime insulin dosing.  Lab testing:   No labs ordered today, he doesn't have time   Plan preappt labs in 5/2022    Advised checking glucose levels premeal and bedtime  Resume use of the Freestyle Libre2 CGM sensor   New Rx sent to his pharmacy   Watch YouTube video for instruction, contact us if questions  Advise having fasting lipid panel testing and dilated eye examination, at least annually    Reminded him to abstain from methamphetamine and other illegal drug use  Encouraged him to participate with local AA program  Addressed patient questions today    Patient Instructions   Diabetes medication plan:  Novolog    9-10U at mealtimes  Novolog correction scale for blood glucose levels >150 mg/dl:      mg/dl  No correction   151-200 mg/dl  +1 units   201-250 mg/dl  +2 units   251-300 mg/dl  +3 units   301-350 mg/dl  +4 units   350-400 mg/dl  +5 units   401-450 mg/dl  +6 units and call physician    Lantus 14U    Subcutaneous bedtime    Check glucose levels before meals and bedtime  New Accu-check Guide test strip Rx sent to pharmacy    Restart Freestyle Libre2 glucose sensor   Scan sensor before meals and bedtime   Use glucose level for insulin correction scale   Watch YouTube video for instructions   New Libre2 sensor Rx sent to pharmacy    Schedule eye exam at eye clinic soon   Request report faxed to our office    Plan fasting lab tests in 5/2022   Arrange lab appt at [facility]    Arrange a follow-up clinic appointment with me in 5/2022   My office  located at the [facility]  Address:  [address], [place], [place]  Endocrinology appointments on Mon, Tues, Wed, and Thursdays.      Future labs ordered today: No orders of the defined types were placed in this encounter.    Radiology/Consults ordered today: None    Total time spent in with the patient evaluation:  28 min  Additional time spent reviewing pertinent lab tests and chart notes, and documentation:  7 min    Follow-up:  5/2022    RON Verdugo MD, MS  Endocrinology  [facility]

## 2022-01-11 NOTE — PATIENT INSTRUCTIONS
Diabetes medication plan:  Novolog    9-10U at mealtimes  Novolog correction scale for blood glucose levels >150 mg/dl:      mg/dl  No correction   151-200 mg/dl  +1 units   201-250 mg/dl  +2 units   251-300 mg/dl  +3 units   301-350 mg/dl  +4 units   350-400 mg/dl  +5 units   401-450 mg/dl  +6 units and call physician    Lantus 14U    Subcutaneous bedtime    Check glucose levels before meals and bedtime  New Accu-check Guide test strip Rx sent to pharmacy    Restart Freestyle Libre2 glucose sensor   Scan sensor before meals and bedtime   Use glucose level for insulin correction scale   Watch YouTube video for instructions   New Libre2 sensor Rx sent to pharmacy    Schedule eye exam at eye clinic soon   Request report faxed to our office    Plan fasting lab tests in 5/2022   Arrange lab appt at [facility]    Arrange a follow-up clinic appointment with me in 5/2022   My office located at the [facility]  Address:  [address], [address], [place], [place]  Endocrinology appointments on Mon, Tues, Wed, and Thursdays.

## 2022-02-17 PROBLEM — E11.10 DKA (DIABETIC KETOACIDOSIS) (H): Status: ACTIVE | Noted: 2018-07-27

## 2022-03-31 DIAGNOSIS — E10.9 TYPE 1 DIABETES MELLITUS WITHOUT COMPLICATION (H): ICD-10-CM

## 2022-05-02 DIAGNOSIS — E10.9 TYPE 1 DIABETES MELLITUS WITHOUT COMPLICATION (H): ICD-10-CM

## 2022-05-02 RX ORDER — PEN NEEDLE, DIABETIC 29 G X1/2"
NEEDLE, DISPOSABLE MISCELLANEOUS
Qty: 200 EACH | Refills: 1 | Status: SHIPPED | OUTPATIENT
Start: 2022-05-02 | End: 2022-05-03

## 2022-05-03 DIAGNOSIS — E10.9 TYPE 1 DIABETES MELLITUS WITHOUT COMPLICATION (H): ICD-10-CM

## 2022-05-03 RX ORDER — PEN NEEDLE, DIABETIC 29 G X1/2"
NEEDLE, DISPOSABLE MISCELLANEOUS
Qty: 200 EACH | Refills: 0 | Status: SHIPPED | OUTPATIENT
Start: 2022-05-03 | End: 2023-03-20

## 2022-05-04 ENCOUNTER — LAB (OUTPATIENT)
Dept: LAB | Facility: CLINIC | Age: 37
End: 2022-05-04
Payer: COMMERCIAL

## 2022-05-04 DIAGNOSIS — E10.9 TYPE 1 DIABETES MELLITUS WITHOUT COMPLICATION (H): ICD-10-CM

## 2022-05-04 LAB
ANION GAP SERPL CALCULATED.3IONS-SCNC: 4 MMOL/L (ref 3–14)
BUN SERPL-MCNC: 14 MG/DL (ref 7–30)
CALCIUM SERPL-MCNC: 8.7 MG/DL (ref 8.5–10.1)
CHLORIDE BLD-SCNC: 100 MMOL/L (ref 94–109)
CHOLEST SERPL-MCNC: 158 MG/DL
CO2 SERPL-SCNC: 29 MMOL/L (ref 20–32)
CREAT SERPL-MCNC: 0.76 MG/DL (ref 0.66–1.25)
CREAT UR-MCNC: 123 MG/DL
FASTING STATUS PATIENT QL REPORTED: YES
GFR SERPL CREATININE-BSD FRML MDRD: >90 ML/MIN/1.73M2
GLUCOSE BLD-MCNC: 274 MG/DL (ref 70–99)
HBA1C MFR BLD: 8.3 % (ref 0–5.6)
HDLC SERPL-MCNC: 47 MG/DL
LDLC SERPL CALC-MCNC: 102 MG/DL
MICROALBUMIN UR-MCNC: 9 MG/L
MICROALBUMIN/CREAT UR: 7.32 MG/G CR (ref 0–17)
NONHDLC SERPL-MCNC: 111 MG/DL
POTASSIUM BLD-SCNC: 4 MMOL/L (ref 3.4–5.3)
SODIUM SERPL-SCNC: 133 MMOL/L (ref 133–144)
TRIGL SERPL-MCNC: 44 MG/DL
TSH SERPL DL<=0.005 MIU/L-ACNC: 2.98 MU/L (ref 0.4–4)

## 2022-05-04 PROCEDURE — 80061 LIPID PANEL: CPT

## 2022-05-04 PROCEDURE — 80048 BASIC METABOLIC PNL TOTAL CA: CPT

## 2022-05-04 PROCEDURE — 84443 ASSAY THYROID STIM HORMONE: CPT

## 2022-05-04 PROCEDURE — 82043 UR ALBUMIN QUANTITATIVE: CPT

## 2022-05-04 PROCEDURE — 83036 HEMOGLOBIN GLYCOSYLATED A1C: CPT

## 2022-05-04 PROCEDURE — 36415 COLL VENOUS BLD VENIPUNCTURE: CPT

## 2022-05-12 ENCOUNTER — OFFICE VISIT (OUTPATIENT)
Dept: ENDOCRINOLOGY | Facility: CLINIC | Age: 37
End: 2022-05-12
Payer: COMMERCIAL

## 2022-05-12 VITALS
BODY MASS INDEX: 28.69 KG/M2 | WEIGHT: 188.7 LBS | DIASTOLIC BLOOD PRESSURE: 76 MMHG | SYSTOLIC BLOOD PRESSURE: 124 MMHG | HEART RATE: 97 BPM

## 2022-05-12 DIAGNOSIS — E10.9 TYPE 1 DIABETES MELLITUS WITHOUT COMPLICATION (H): Primary | ICD-10-CM

## 2022-05-12 PROCEDURE — 99214 OFFICE O/P EST MOD 30 MIN: CPT | Performed by: INTERNAL MEDICINE

## 2022-05-12 RX ORDER — INSULIN ASPART 100 [IU]/ML
INJECTION, SOLUTION INTRAVENOUS; SUBCUTANEOUS
COMMUNITY
Start: 2022-04-26 | End: 2022-09-07

## 2022-05-12 NOTE — LETTER
5/12/2022         RE: Olman Newby  [address] [place] [address]        Dear Colleague,    Thank you for referring your patient, Olman Newby, to the [facility]. Please see a copy of my visit note below.      Recent issues:  Diabetes follow-up evaluation  Did not start the Freestyle Noam sensor use, states unclear how to insert and use them  Feeling pretty well overall, no recent health concerns        Previous diagnosis of diabetes mellitus  Details of initial diagnosis, lab testing, and management not available.  Treatment with insulin injections  Using base-dose rapid acting insulin and daily long-acting insulin MDI plan  Infrequent BG testing, chronic higher hgbA1c levels in the 7.5-8.5% range overall  Uses vial/syringe with insulin dosing, but sometimes postmeal Nv dosing  Changed Levemir to Lantus vial insulin    Current DM meds:   Novolog  9-10U at mealtimes    Guestimates Nv sscale  Lantus 14U  Subcutaneous bedtime    Using Verio BG meter, previous variable testing   Recently tests 4x/day   Recent 30d avg 190 mg/dl   Good hypoglycemia awareness    Previous [facility] labs include:      Previous [facility] labs include:  Lab Results   Component Value Date    A1C 8.3 (H) 05/04/2022     05/04/2022    POTASSIUM 4.0 05/04/2022    CHLORIDE 100 05/04/2022    CO2 29 05/04/2022    ANIONGAP 4 05/04/2022     (H) 05/04/2022    BUN 14 05/04/2022    CR 0.76 05/04/2022    GFRESTIMATED >90 05/04/2022    GFRESTBLACK >90 02/14/2020    JOHN 8.7 05/04/2022    CHOL 158 05/04/2022    TRIG 44 05/04/2022    HDL 47 05/04/2022     (H) 05/04/2022    NHDL 111 05/04/2022    UCRR 123 05/04/2022    MICROL 9 05/04/2022    UMALCR 7.32 05/04/2022     Lab Results   Component Value Date    TSH 2.98 05/04/2022     History of substance abuse with alcohol, oral metamphetamines, still using both  Completed chemical dependency outpatient treatment at [facility] in  1/2020  Last eye exam 2018, no reported DR per patient  DM Complications:  None known      Has lived with his parents and girlfriend Kim; works at [employer] in [place] [facility] [place]; daughter Fabien age 16  [facility] for general medicine evaluations.    PMH/PSH:  Past Medical History:   Diagnosis Date     Anxiety      Depression      Hypoglycemia      Substance abuse (H)      Type 1 diabetes (H)      No past surgical history on file.    Family Hx:  No family history on file.      Social Hx:  Social History     Socioeconomic History     Marital status: Single     Spouse name: Not on file     Number of children: Not on file     Years of education: Not on file     Highest education level: Not on file   Occupational History     Not on file   Tobacco Use     Smoking status: Current Every Day Smoker     Packs/day: 0.50     Smokeless tobacco: Never Used   Substance and Sexual Activity     Alcohol use: Not Currently     Drug use: Yes     Types: Methamphetamines     Sexual activity: Not on file   Other Topics Concern     Not on file   Social History Narrative     Not on file     Social Determinants of Health     Financial Resource Strain: Not on file   Food Insecurity: Not on file   Transportation Needs: Not on file   Physical Activity: Not on file   Stress: Not on file   Social Connections: Not on file   Intimate Partner Violence: Not on file   Housing Stability: Not on file          MEDICATIONS:  has a current medication list which includes the following prescription(s): aspirin, insulin aspart, insulin glargine, bd insulin syringe u/f, blood glucose, blood glucose calibration, freestyle anton 2 sensor, insulin aspart, insulin aspart, insulin aspart, insulin regular, bd insulin syringe u/f, and insulin syringe-needle u-100.    ROS:     ROS: 10 point ROS neg other than the symptoms noted above in the HPI.    GENERAL: energy good, no weight changes; denies fevers, chills, night sweats.    HEENT: no dysphagia, odonophagia, diplopia, neck pain  THYROID:  no apparent hyper or hypothyroid symptoms  CV: no chest pain, pressure, palpitations  LUNGS: no SOB, BRYAN, cough, wheezing   ABDOMEN: no diarrhea, constipation, abdominal pain  EXTREMITIES: no rashes, ulcers, edema  NEUROLOGY: no headaches, denies changes in vision, tingling, extremitiy numbness   MSK: no muscle aches or pains, weakness  SKIN: no rashes or lesions  PSYCH:  stable mood, no significant anxiety or depression  ENDOCRINE: no heat or cold intolerance      Physical Exam   VS: /76   Pulse 97   Wt 85.6 kg (188 lb 11.2 oz)   BMI 28.69 kg/m    GENERAL: AXOX3, NAD, anxious and jittery, well dressed, answering questions appropriately, appears stated age.  ENT: no nose swelling or nasal discharge, mouth redness or gum changes.  EYES: eyes grossly normal to inspection, conjunctivae and sclerae normal, no exophthalmos or proptosis  THYROID:  no apparent nodules or goiter  LUNGS: no audible wheeze, cough or visible cyanosis, or increased work of breathing  ABDOMEN: abdomen normal size  EXTREMITIES: no edema noted  NEUROLOGY: CN grossly intact, no tremors  MSK: grossly intact  SKIN:  no apparent skin lesions, rash, or edema with visualized skin appearance  PSYCH: hyperactive, jittery, mentation appears normal, affect normal/bright, judgement and insight intact,   Rapid speech and appearance well groomed    LABS:    All pertinent notes, labs, and images personally reviewed by me.     A/P:  Encounter Diagnosis   Name Primary?     Type 1 diabetes mellitus without complication (H) Yes       Comments:  Reviewed health history and diabetes management.  Difficult to assess overall glucose control with limited insulin dose information  Reviewed and interpreted tests that I previously ordered.   Ordered appropriate tests for the endocrinology disease management.    Management options discussed and implemented after shared medical decision making with  the patient.  T1DM problem is chronic with exacerbation progression, hyperglycemia    Plan:  Discussed general issues with the diabetes diagnosis and management  We discussed the hgbA1c test which reflects previous overall glucose levels or control  Discussed the importance of blood glucose (BG) testing to assess glucose trends  Provided general overview of the multiple daily injection (MDI) plan using rapid acting mealtime and longacting insulin medications    Recommend:  Continue current Lantus and Novolog MDI insulin injection plan  Avoid missing mealtime (and correction Novolog) insulin doses  Try to use more precise Novolog correction scale as:    mg/dl  No correction   151-200 mg/dl  +1 units   201-250 mg/dl  +2 units   251-300 mg/dl  +3 units   301-350 mg/dl  +4 units   350-400 mg/dl  +5 units   401-450 mg/dl  +6 units and call physician    Would benefit from learning, using insulin-to-carb counting (I:C) method for mealtime insulin dosing.  No labs ordered at this time  Completed [place] form, though doubt it will be necessary with new state guidelines   Signed original given to patient, copy will be faxed in and scanned at our office  Advised checking glucose levels premeal and bedtime  Resume use of the Freestyle Libre2 CGM sensor   Watch YouTube video for instruction, contact us if questions   Instructional pamphlet also given to him today  Advise having fasting lipid panel testing and dilated eye examination, at least annually    Schedule follow-up general medicine appointment with his PCP  Reminded him to abstain from methamphetamine and other illegal drug use  I have encouraged him to participate with local AA program  Addressed patient questions today    Patient Instructions   Diabetes medication plan:  Novolog     Breakfast   10 units   Lunch    10 units   Supper   10 units    Novolog correction scale:    mg/dl  No correction   151-200 mg/dl  +1 units   201-250 mg/dl  +2 units   251-300  mg/dl  +3 units   301-350 mg/dl  +4 units   350-400 mg/dl  +5 units   401-450 mg/dl  +6 units and call physician     Daisyus  15U    Subcutaneous bedtime      Novolog = Aspart insulin  Important to check glucose levels (meter or Libre2) before meals   And at bedtime, then use Novolog correction scale dosing  Watch YouTube video on Libre2 placement or   Contact Dr. Verdugo to arrange a work-in office appt for additional   Instruction on Libre2 placement and use (bring [place] device)  Next appt in 9/2022      Future labs ordered today: No orders of the defined types were placed in this encounter.    Radiology/Consults ordered today: None    Total time spent in with the patient evaluation:  25 min  Additional time spent reviewing pertinent lab tests and chart notes, and documentation:  6 min    Follow-up:  9/2022 or 10/2022    RON Verdugo MD, MS  Endocrinology  [facility]    CC:  Diane Brice                     Again, thank you for allowing me to participate in the care of your patient.        Sincerely,        Óscar Verdugo MD

## 2022-05-12 NOTE — PATIENT INSTRUCTIONS
Diabetes medication plan:  Novolog     Breakfast   10 units   Lunch    10 units   Supper   10 units    Novolog correction scale:    mg/dl  No correction   151-200 mg/dl  +1 units   201-250 mg/dl  +2 units   251-300 mg/dl  +3 units   301-350 mg/dl  +4 units   350-400 mg/dl  +5 units   401-450 mg/dl  +6 units and call physician     Lantus  15U    Subcutaneous bedtime      Novolog = Aspart insulin  Important to check glucose levels (meter or Libre2) before meals   And at bedtime, then use Novolog correction scale dosing  Watch YouTube video on Libre2 placement or   Contact Dr. Verdugo to arrange a work-in office appt for additional   Instruction on Libre2 placement and use (bring [place] device)  Next appt in 9/2022

## 2022-05-12 NOTE — PROGRESS NOTES
Recent issues:  Diabetes follow-up evaluation  Did not start the Freestyle Noam sensor use, states unclear how to insert and use them  Feeling pretty well overall, no recent health concerns        Previous diagnosis of diabetes mellitus  Details of initial diagnosis, lab testing, and management not available.  Treatment with insulin injections  Using base-dose rapid acting insulin and daily long-acting insulin MDI plan  Infrequent BG testing, chronic higher hgbA1c levels in the 7.5-8.5% range overall  Uses vial/syringe with insulin dosing, but sometimes postmeal Nv dosing  Changed Levemir to Lantus vial insulin    Current DM meds:   Novolog  9-10U at mealtimes    Guestimates Nv sscale  Lantus 14U  Subcutaneous bedtime    Using Verio BG meter, previous variable testing   Recently tests 4x/day   Recent 30d avg 190 mg/dl   Good hypoglycemia awareness    Previous [facility] labs include:      Previous [facility] labs include:  Lab Results   Component Value Date    A1C 8.3 (H) 05/04/2022     05/04/2022    POTASSIUM 4.0 05/04/2022    CHLORIDE 100 05/04/2022    CO2 29 05/04/2022    ANIONGAP 4 05/04/2022     (H) 05/04/2022    BUN 14 05/04/2022    CR 0.76 05/04/2022    GFRESTIMATED >90 05/04/2022    GFRESTBLACK >90 02/14/2020    JOHN 8.7 05/04/2022    CHOL 158 05/04/2022    TRIG 44 05/04/2022    HDL 47 05/04/2022     (H) 05/04/2022    NHDL 111 05/04/2022    UCRR 123 05/04/2022    MICROL 9 05/04/2022    UMALCR 7.32 05/04/2022     Lab Results   Component Value Date    TSH 2.98 05/04/2022     History of substance abuse with alcohol, oral metamphetamines, still using both  Completed chemical dependency outpatient treatment at [facility] [place] in 1/2020  Last eye exam 2018, no reported DR per patient  DM Complications:  None known      Has lived with his parents and girlfriend Kim; works at [employer] in [place] [facility] [place]; daughter Fabien age 16  Sees Dr. Diane Brice/[facility] for general medicine  evaluations.    PMH/PSH:  Past Medical History:   Diagnosis Date     Anxiety      Depression      Hypoglycemia      Substance abuse (H)      Type 1 diabetes (H)      No past surgical history on file.    Family Hx:  No family history on file.      Social Hx:  Social History     Socioeconomic History     Marital status: Single     Spouse name: Not on file     Number of children: Not on file     Years of education: Not on file     Highest education level: Not on file   Occupational History     Not on file   Tobacco Use     Smoking status: Current Every Day Smoker     Packs/day: 0.50     Smokeless tobacco: Never Used   Substance and Sexual Activity     Alcohol use: Not Currently     Drug use: Yes     Types: Methamphetamines     Sexual activity: Not on file   Other Topics Concern     Not on file   Social History Narrative     Not on file     Social Determinants of Health     Financial Resource Strain: Not on file   Food Insecurity: Not on file   Transportation Needs: Not on file   Physical Activity: Not on file   Stress: Not on file   Social Connections: Not on file   Intimate Partner Violence: Not on file   Housing Stability: Not on file          MEDICATIONS:  has a current medication list which includes the following prescription(s): aspirin, insulin aspart, insulin glargine, bd insulin syringe u/f, blood glucose, blood glucose calibration, freestyle anton 2 sensor, insulin aspart, insulin aspart, insulin aspart, insulin regular, bd insulin syringe u/f, and insulin syringe-needle u-100.    ROS:     ROS: 10 point ROS neg other than the symptoms noted above in the HPI.    GENERAL: energy good, no weight changes; denies fevers, chills, night sweats.   HEENT: no dysphagia, odonophagia, diplopia, neck pain  THYROID:  no apparent hyper or hypothyroid symptoms  CV: no chest pain, pressure, palpitations  LUNGS: no SOB, BRYAN, cough, wheezing   ABDOMEN: no diarrhea, constipation, abdominal pain  EXTREMITIES: no rashes, ulcers,  edema  NEUROLOGY: no headaches, denies changes in vision, tingling, extremitiy numbness   MSK: no muscle aches or pains, weakness  SKIN: no rashes or lesions  PSYCH:  stable mood, no significant anxiety or depression  ENDOCRINE: no heat or cold intolerance      Physical Exam   VS: /76   Pulse 97   Wt 85.6 kg (188 lb 11.2 oz)   BMI 28.69 kg/m    GENERAL: AXOX3, NAD, anxious and jittery, well dressed, answering questions appropriately, appears stated age.  ENT: no nose swelling or nasal discharge, mouth redness or gum changes.  EYES: eyes grossly normal to inspection, conjunctivae and sclerae normal, no exophthalmos or proptosis  THYROID:  no apparent nodules or goiter  LUNGS: no audible wheeze, cough or visible cyanosis, or increased work of breathing  ABDOMEN: abdomen normal size  EXTREMITIES: no edema noted  NEUROLOGY: CN grossly intact, no tremors  MSK: grossly intact  SKIN:  no apparent skin lesions, rash, or edema with visualized skin appearance  PSYCH: hyperactive, jittery, mentation appears normal, affect normal/bright, judgement and insight intact,   Rapid speech and appearance well groomed    LABS:    All pertinent notes, labs, and images personally reviewed by me.     A/P:  Encounter Diagnosis   Name Primary?     Type 1 diabetes mellitus without complication (H) Yes       Comments:  Reviewed health history and diabetes management.  Difficult to assess overall glucose control with limited insulin dose information  Reviewed and interpreted tests that I previously ordered.   Ordered appropriate tests for the endocrinology disease management.    Management options discussed and implemented after shared medical decision making with the patient.  T1DM problem is chronic with exacerbation progression, hyperglycemia    Plan:  Discussed general issues with the diabetes diagnosis and management  We discussed the hgbA1c test which reflects previous overall glucose levels or control  Discussed the importance  of blood glucose (BG) testing to assess glucose trends  Provided general overview of the multiple daily injection (MDI) plan using rapid acting mealtime and longacting insulin medications    Recommend:  Continue current Lantus and Novolog MDI insulin injection plan  Avoid missing mealtime (and correction Novolog) insulin doses  Try to use more precise Novolog correction scale as:    mg/dl  No correction   151-200 mg/dl  +1 units   201-250 mg/dl  +2 units   251-300 mg/dl  +3 units   301-350 mg/dl  +4 units   350-400 mg/dl  +5 units   401-450 mg/dl  +6 units and call physician    Would benefit from learning, using insulin-to-carb counting (I:C) method for mealtime insulin dosing.  No labs ordered at this time  Completed his [place] form, though doubt it will be necessary with new state guidelines   Signed original given to patient, copy will be faxed in and scanned at our office  Advised checking glucose levels premeal and bedtime  Resume use of the Freestyle Libre2 CGM sensor   Watch YouTube video for instruction, contact us if questions   Instructional pamphlet also given to him today  Advise having fasting lipid panel testing and dilated eye examination, at least annually    Schedule follow-up general medicine appointment with his PCP  Reminded him to abstain from methamphetamine and other illegal drug use  I have encouraged him to participate with local AA program  Addressed patient questions today    Patient Instructions   Diabetes medication plan:  Novolog     Breakfast   10 units   Lunch    10 units   Supper   10 units    Novolog correction scale:    mg/dl  No correction   151-200 mg/dl  +1 units   201-250 mg/dl  +2 units   251-300 mg/dl  +3 units   301-350 mg/dl  +4 units   350-400 mg/dl  +5 units   401-450 mg/dl  +6 units and call physician     Lantus  15U    Subcutaneous bedtime      Novolog = Aspart insulin  Important to check glucose levels (meter or Libre2) before meals   And at bedtime, then  use Novolog correction scale dosing  Watch YouTube video on Libre2 placement or   Contact Dr. Verdugo to arrange a work-in office appt for additional   Instruction on Libre2 placement and use (bring [place] device)  Next appt in 9/2022      Future labs ordered today: No orders of the defined types were placed in this encounter.    Radiology/Consults ordered today: None    Total time spent in with the patient evaluation:  25 min  Additional time spent reviewing pertinent lab tests and chart notes, and documentation:  6 min    Follow-up:  9/2022 or 10/2022    RON Verdugo MD, MS  Endocrinology  [facility]    CC:  Diane Brice

## 2022-05-21 DIAGNOSIS — E10.9 TYPE 1 DIABETES MELLITUS WITHOUT COMPLICATION (H): ICD-10-CM

## 2022-05-23 RX ORDER — INSULIN ASPART 100 [IU]/ML
INJECTION, SOLUTION INTRAVENOUS; SUBCUTANEOUS
Qty: 20 ML | Refills: 3 | Status: SHIPPED | OUTPATIENT
Start: 2022-05-23 | End: 2022-09-07

## 2022-05-23 NOTE — TELEPHONE ENCOUNTER
"Last Written Prescription Date:  8/2/2021  Last Fill Quantity: 20ml,  # refills: 3   Last office visit: 5/12/2022 with prescribing provider:  Dr. Verdugo    Future Office Visit:      Requested Prescriptions   Pending Prescriptions Disp Refills     insulin aspart (NOVOLOG VIAL) 100 UNITS/ML vial [Pharmacy Med Name: INSULIN ASPART 100/ML INJ,10ML] 20 mL      Sig: INJECT 8 TO 10 UNITS UNDER THE SKIN AT MEALTIME AS DIRECTED** TOTAL DAILY DOSE OF 35 UNITS       Short Acting Insulin Protocol Passed - 5/21/2022 10:13 AM        Passed - Serum creatinine on file in past 12 months     Recent Labs   Lab Test 05/04/22  0832   CR 0.76       Ok to refill medication if creatinine is low          Passed - HgbA1C in past 3 or 6 months     If HgbA1C is 8 or greater, it needs to be on file within the past 3 months.  If less than 8, must be on file within the past 6 months.     Recent Labs   Lab Test 05/04/22  0832   A1C 8.3*             Passed - Medication is active on med list        Passed - Patient is age 18 or older        Passed - Recent (6 mo) or future (30 days) visit within the authorizing provider's specialty     Patient had office visit in the last 6 months or has a visit in the next 30 days with authorizing provider or within the authorizing provider's specialty.  See \"Patient Info\" tab in inbasket, or \"Choose Columns\" in Meds & Orders section of the refill encounter.                     "

## 2022-05-24 DIAGNOSIS — E10.9 TYPE 1 DIABETES MELLITUS WITHOUT COMPLICATION (H): ICD-10-CM

## 2022-05-25 RX ORDER — INSULIN GLARGINE 100 [IU]/ML
INJECTION, SOLUTION SUBCUTANEOUS
Qty: 10 ML | Refills: 0 | Status: SHIPPED | OUTPATIENT
Start: 2022-05-25 | End: 2022-09-07

## 2022-05-25 NOTE — TELEPHONE ENCOUNTER
"Last Written Prescription Date:  2/18/21  Last Fill Quantity: 10,  # refills: 0   Last office visit: 5/12/2022 with prescribing provider: Dr. Verdugo  Future Office Visit: 9/7/22        Requested Prescriptions   Pending Prescriptions Disp Refills     LANTUS VIAL 100 UNIT/ML soln [Pharmacy Med Name: LANTUS U-100 INSULIN 10ML] 10 mL 0     Sig: ADMINISTER 14 UNITS UNDER THE SKIN AT BEDTIME       Long Acting Insulin Protocol Passed - 5/24/2022  9:53 AM        Passed - Serum creatinine on file in past 12 months     Recent Labs   Lab Test 05/04/22  0832   CR 0.76       Ok to refill medication if creatinine is low          Passed - HgbA1C in past 3 or 6 months     If HgbA1C is 8 or greater, it needs to be on file within the past 3 months.  If less than 8, must be on file within the past 6 months.     Recent Labs   Lab Test 05/04/22  0832   A1C 8.3*             Passed - Medication is active on med list        Passed - Patient is age 18 or older        Passed - Recent (6 mo) or future (30 days) visit within the authorizing provider's specialty     Patient had office visit in the last 6 months or has a visit in the next 30 days with authorizing provider or within the authorizing provider's specialty.  See \"Patient Info\" tab in inbasket, or \"Choose Columns\" in Meds & Orders section of the refill encounter.               Routing to provider as Rx hasn't been filled since 2/21.  Yodit Ryan RN    "

## 2022-09-01 ENCOUNTER — TELEPHONE (OUTPATIENT)
Dept: ENDOCRINOLOGY | Facility: CLINIC | Age: 37
End: 2022-09-01

## 2022-09-01 DIAGNOSIS — E10.9 TYPE 1 DIABETES MELLITUS WITHOUT COMPLICATION (H): ICD-10-CM

## 2022-09-01 NOTE — TELEPHONE ENCOUNTER
[facility] Call Center    Phone Message    May a detailed message be left on voicemail: yes     Reason for Call: Medication Refill Request    Has the patient contacted the pharmacy for the refill? Yes   Name of medication being requested: Continuous Blood Gluc Sensor (FREESTYLE BARBARA 2 SENSOR) [facility]    Provider who prescribed the medication: Dr. Verdugo    Pharmacy: [id number] - [place] - [address]    Date medication is needed: asap           Action Taken: Other: ENDO    Travel Screening: Not Applicable

## 2022-09-02 NOTE — TELEPHONE ENCOUNTER
Last Written Prescription Date: 1/11/22  Last Fill Quantity: 2,  # refills: 11   Last office visit: 5/12/2022 with prescribing provider: Dr. Verdugo  Future Office Visit:  9/7/22    Refilled per protocol  Yodit Ryan RN

## 2022-09-07 ENCOUNTER — OFFICE VISIT (OUTPATIENT)
Dept: ENDOCRINOLOGY | Facility: CLINIC | Age: 37
End: 2022-09-07
Payer: COMMERCIAL

## 2022-09-07 VITALS
SYSTOLIC BLOOD PRESSURE: 117 MMHG | BODY MASS INDEX: 27.31 KG/M2 | DIASTOLIC BLOOD PRESSURE: 80 MMHG | HEART RATE: 96 BPM | HEIGHT: 68 IN | WEIGHT: 180.2 LBS

## 2022-09-07 DIAGNOSIS — E10.9 TYPE 1 DIABETES MELLITUS WITHOUT COMPLICATION (H): Primary | ICD-10-CM

## 2022-09-07 DIAGNOSIS — E10.9 TYPE 1 DIABETES MELLITUS WITHOUT COMPLICATION (H): ICD-10-CM

## 2022-09-07 PROCEDURE — 99214 OFFICE O/P EST MOD 30 MIN: CPT | Performed by: INTERNAL MEDICINE

## 2022-09-07 RX ORDER — INSULIN GLARGINE 100 [IU]/ML
INJECTION, SOLUTION SUBCUTANEOUS
Qty: 10 ML | Refills: 5 | Status: SHIPPED | OUTPATIENT
Start: 2022-09-07 | End: 2023-09-28

## 2022-09-07 RX ORDER — INSULIN ASPART 100 [IU]/ML
INJECTION, SOLUTION INTRAVENOUS; SUBCUTANEOUS
Qty: 20 ML | Refills: 5 | Status: SHIPPED | OUTPATIENT
Start: 2022-09-07 | End: 2023-10-02

## 2022-09-07 RX ORDER — PEN NEEDLE, DIABETIC 29 G X1/2"
NEEDLE, DISPOSABLE MISCELLANEOUS
Qty: 150 EACH | Refills: 11 | Status: SHIPPED | OUTPATIENT
Start: 2022-09-07

## 2022-09-07 NOTE — LETTER
9/7/2022         RE: Olman Newby  [address] [place] [address]        Dear Colleague,    Thank you for referring your patient, Olman Newby, to the [facility] [place]. Please see a copy of my visit note below.      Recent issues:  Diabetes follow-up evaluation  Has worn the Freestyle Noam CGM in the past, but not recently... very limited BGM data available today  Feeling pretty well overall, no recent health concerns        Previous diagnosis of diabetes mellitus  Details of initial diagnosis, lab testing, and management not available.  Treatment with insulin injections  Using base-dose rapid acting insulin and daily long-acting insulin MDI plan  Infrequent BG testing, chronic higher hgbA1c levels in the 7.5-8.5% range overall  Uses vial/syringe with insulin dosing, but sometimes postmeal Nv dosing  Changed Levemir to Lantus vial insulin    Current DM meds:   Novolog  5-10U at mealtimes    Guestimates Nv sscale  Lantus 14U  Subcutaneous bedtime    Using Verio BG meter, previous variable testing   Recently tests 4x/day   No recent BGM meter data   Good hypoglycemia awareness    Previous [facility] labs include:      Previous [facility] labs include:  Lab Results   Component Value Date    A1C 8.3 (H) 05/04/2022     05/04/2022    POTASSIUM 4.0 05/04/2022    CHLORIDE 100 05/04/2022    CO2 29 05/04/2022    ANIONGAP 4 05/04/2022     (H) 05/04/2022    BUN 14 05/04/2022    CR 0.76 05/04/2022    GFRESTIMATED >90 05/04/2022    GFRESTBLACK >90 02/14/2020    JOHN 8.7 05/04/2022    CHOL 158 05/04/2022    TRIG 44 05/04/2022    HDL 47 05/04/2022     (H) 05/04/2022    NHDL 111 05/04/2022    UCRR 123 05/04/2022    MICROL 9 05/04/2022    UMALCR 7.32 05/04/2022     Lab Results   Component Value Date    TSH 2.98 05/04/2022     History of substance abuse with alcohol, oral metamphetamines  Completed chemical dependency outpatient treatment at [facility] in  1/2020  Last eye exam 2018, no reported DR per patient  DM Complications:  None known      Has lived with his parents and girlfriend Kim; works at [employer]; daughter Fabien age 16  [facility] medicine evaluations.    PMH/PSH:  Past Medical History:   Diagnosis Date     Anxiety      Depression      Hypoglycemia      Substance abuse (H)      Type 1 diabetes (H)      No past surgical history on file.    Family Hx:  No family history on file.      Social Hx:  Social History     Socioeconomic History     Marital status: Single     Spouse name: Not on file     Number of children: Not on file     Years of education: Not on file     Highest education level: Not on file   Occupational History     Not on file   Tobacco Use     Smoking status: Current Every Day Smoker     Packs/day: 0.50     Smokeless tobacco: Never Used   Substance and Sexual Activity     Alcohol use: Not Currently     Drug use: Yes     Types: Methamphetamines     Sexual activity: Not on file   Other Topics Concern     Not on file   Social History Narrative     Not on file     Social Determinants of Health     Financial Resource Strain: Not on file   Food Insecurity: Not on file   Transportation Needs: Not on file   Physical Activity: Not on file   Stress: Not on file   Social Connections: Not on file   Intimate Partner Violence: Not on file   Housing Stability: Not on file          MEDICATIONS:  has a current medication list which includes the following prescription(s): aspirin, insulin aspart, lantus vial, vitamin d, bd insulin syringe u/f, blood glucose, blood glucose calibration, freestyle anton 2 sensor, insulin aspart, insulin aspart, insulin aspart, insulin regular, bd insulin syringe u/f, and insulin syringe-needle u-100.    ROS:     ROS: 10 point ROS neg other than the symptoms noted above in the HPI.    GENERAL: energy good, no weight changes; denies fevers, chills, night sweats.   HEENT: no  "dysphagia, odonophagia, diplopia, neck pain  THYROID:  no apparent hyper or hypothyroid symptoms  CV: no chest pain, pressure, palpitations  LUNGS: no SOB, BRYAN, cough, wheezing   ABDOMEN: no diarrhea, constipation, abdominal pain  EXTREMITIES: no rashes, ulcers, edema  NEUROLOGY: no headaches, denies changes in vision, tingling, extremitiy numbness   MSK: no muscle aches or pains, weakness  SKIN: no rashes or lesions  PSYCH:  stable mood, no significant anxiety or depression  ENDOCRINE: no heat or cold intolerance      Physical Exam   VS: /80   Pulse 96   Ht 1.727 m (5' 8\")   Wt 81.7 kg (180 lb 3.2 oz)   BMI 27.40 kg/m    GENERAL: AXOX3, NAD, well dressed, answering questions appropriately, appears stated age.  ENT: no nose swelling or nasal discharge, mouth redness or gum changes.  EYES: eyes grossly normal to inspection, conjunctivae and sclerae normal, no exophthalmos or proptosis  THYROID:  no apparent nodules or goiter  LUNGS: no audible wheeze, cough or visible cyanosis, or increased work of breathing  ABDOMEN: abdomen normal size  EXTREMITIES: no edema noted  NEUROLOGY: CN grossly intact, no tremors  MSK: grossly intact  SKIN:  scalp bald; no apparent skin lesions, rash, or edema with visualized skin appearance    LABS:    All pertinent notes, labs, and images personally reviewed by me.     A/P:  Encounter Diagnosis   Name Primary?     Type 1 diabetes mellitus without complication (H) Yes     Comments:  Reviewed health history and diabetes management.  Difficult to assess overall glucose control with limited insulin dose and glucose trend information  Needs abstinence from Crystal Meth, discussed  Reviewed and interpreted tests that I previously ordered.   Ordered appropriate tests for the endocrinology disease management.    Management options discussed and implemented after shared medical decision making with the patient.  T1DM problem is chronic with exacerbation progression, " hyperglycemia    Plan:  Discussed general issues with the diabetes diagnosis and management  We discussed the hgbA1c test which reflects previous overall glucose levels or control  Discussed the importance of blood glucose (BG) testing to assess glucose trends  Provided general overview of the multiple daily injection (MDI) plan using rapid acting mealtime and longacting insulin medications    Recommend:  Continue current Lantus and Novolog MDI insulin injection plan  Avoid missing mealtime (and correction Novolog) insulin doses  Try to use more precise Novolog correction scale as:    mg/dl  No correction   151-200 mg/dl  +1 units   201-250 mg/dl  +2 units   251-300 mg/dl  +3 units   301-350 mg/dl  +4 units   350-400 mg/dl  +5 units   401-450 mg/dl  +6 units and call physician    Would benefit from learning, using insulin-to-carb counting (I:C) method for mealtime insulin dosing.  No labs ordered at this time  Advised checking glucose levels premeal and bedtime  Resume use of the Freestyle Libre2 CGM sensor   Watch YouTube video for instruction, contact us if questions  Plan an appointment with one of our [facility] Diab Educators    Review the MDI treatment plan, use of Novolog sscale   Review his Noam CGM data and provide guidance on mealtime Novolog base-doses, Lantus dosing   Diab Ed Referral placed  Advise having fasting lipid panel testing and dilated eye examination, at least annually    Schedule follow-up general medicine appointment with his PCP  Reminded him to abstain from methamphetamine and other illegal drug use  I have encouraged him to participate with local AA program  Addressed patient questions today    There are no Patient Instructions on file for this visit.    Future labs ordered today:   Orders Placed This Encounter   Procedures     AMB Adult Diabetes Educator Referral     Radiology/Consults ordered today: AMBULATORY ADULT DIABETES EDUCATOR REFERRAL    Total time spent in with the patient  evaluation:  22 min  Additional time spent reviewing pertinent lab tests and chart notes, and documentation:  10 min    Follow-up:  1/2023, Return    RON Verdugo MD, MS  Endocrinology  [facility]                         Again, thank you for allowing me to participate in the care of your patient.        Sincerely,        Óscar Verdugo MD

## 2022-09-07 NOTE — PROGRESS NOTES
Recent issues:  Diabetes follow-up evaluation  Has worn the Freestyle Noam CGM in the past, but not recently... very limited BGM data available today  Feeling pretty well overall, no recent health concerns        Previous diagnosis of diabetes mellitus  Details of initial diagnosis, lab testing, and management not available.  Treatment with insulin injections  Using base-dose rapid acting insulin and daily long-acting insulin MDI plan  Infrequent BG testing, chronic higher hgbA1c levels in the 7.5-8.5% range overall  Uses vial/syringe with insulin dosing, but sometimes postmeal Nv dosing  Changed Levemir to Lantus vial insulin    Current DM meds:   Novolog  5-10U at mealtimes    Guestimates Nv sscale  Lantus 14U  Subcutaneous bedtime    Using Verio BG meter, previous variable testing   Recently tests 4x/day   No recent BGM meter data   Good hypoglycemia awareness    Previous [facility] labs include:      Previous [facility] labs include:  Lab Results   Component Value Date    A1C 8.3 (H) 05/04/2022     05/04/2022    POTASSIUM 4.0 05/04/2022    CHLORIDE 100 05/04/2022    CO2 29 05/04/2022    ANIONGAP 4 05/04/2022     (H) 05/04/2022    BUN 14 05/04/2022    CR 0.76 05/04/2022    GFRESTIMATED >90 05/04/2022    GFRESTBLACK >90 02/14/2020    JOHN 8.7 05/04/2022    CHOL 158 05/04/2022    TRIG 44 05/04/2022    HDL 47 05/04/2022     (H) 05/04/2022    NHDL 111 05/04/2022    UCRR 123 05/04/2022    MICROL 9 05/04/2022    UMALCR 7.32 05/04/2022     Lab Results   Component Value Date    TSH 2.98 05/04/2022     History of substance abuse with alcohol, oral metamphetamines  Completed chemical dependency outpatient treatment at [facility] in [place] in 1/2020  Last eye exam 2018, no reported DR per patient  DM Complications:  None known      Has lived with his parents and girlfriend Kim; works at [employer]; daughter Fabien age 16  Sees Dr. Diane Brice/[facility] for general medicine  evaluations.    PMH/PSH:  Past Medical History:   Diagnosis Date     Anxiety      Depression      Hypoglycemia      Substance abuse (H)      Type 1 diabetes (H)      No past surgical history on file.    Family Hx:  No family history on file.      Social Hx:  Social History     Socioeconomic History     Marital status: Single     Spouse name: Not on file     Number of children: Not on file     Years of education: Not on file     Highest education level: Not on file   Occupational History     Not on file   Tobacco Use     Smoking status: Current Every Day Smoker     Packs/day: 0.50     Smokeless tobacco: Never Used   Substance and Sexual Activity     Alcohol use: Not Currently     Drug use: Yes     Types: Methamphetamines     Sexual activity: Not on file   Other Topics Concern     Not on file   Social History Narrative     Not on file     Social Determinants of Health     Financial Resource Strain: Not on file   Food Insecurity: Not on file   Transportation Needs: Not on file   Physical Activity: Not on file   Stress: Not on file   Social Connections: Not on file   Intimate Partner Violence: Not on file   Housing Stability: Not on file          MEDICATIONS:  has a current medication list which includes the following prescription(s): aspirin, insulin aspart, lantus vial, vitamin d, bd insulin syringe u/f, blood glucose, blood glucose calibration, freestyle anton 2 sensor, insulin aspart, insulin aspart, insulin aspart, insulin regular, bd insulin syringe u/f, and insulin syringe-needle u-100.    ROS:     ROS: 10 point ROS neg other than the symptoms noted above in the HPI.    GENERAL: energy good, no weight changes; denies fevers, chills, night sweats.   HEENT: no dysphagia, odonophagia, diplopia, neck pain  THYROID:  no apparent hyper or hypothyroid symptoms  CV: no chest pain, pressure, palpitations  LUNGS: no SOB, BRYAN, cough, wheezing   ABDOMEN: no diarrhea, constipation, abdominal pain  EXTREMITIES: no rashes,  "ulcers, edema  NEUROLOGY: no headaches, denies changes in vision, tingling, extremitiy numbness   MSK: no muscle aches or pains, weakness  SKIN: no rashes or lesions  PSYCH:  stable mood, no significant anxiety or depression  ENDOCRINE: no heat or cold intolerance      Physical Exam   VS: /80   Pulse 96   Ht 1.727 m (5' 8\")   Wt 81.7 kg (180 lb 3.2 oz)   BMI 27.40 kg/m    GENERAL: AXOX3, NAD, well dressed, answering questions appropriately, appears stated age.  ENT: no nose swelling or nasal discharge, mouth redness or gum changes.  EYES: eyes grossly normal to inspection, conjunctivae and sclerae normal, no exophthalmos or proptosis  THYROID:  no apparent nodules or goiter  LUNGS: no audible wheeze, cough or visible cyanosis, or increased work of breathing  ABDOMEN: abdomen normal size  EXTREMITIES: no edema noted  NEUROLOGY: CN grossly intact, no tremors  MSK: grossly intact  SKIN:  scalp bald; no apparent skin lesions, rash, or edema with visualized skin appearance    LABS:    All pertinent notes, labs, and images personally reviewed by me.     A/P:  Encounter Diagnosis   Name Primary?     Type 1 diabetes mellitus without complication (H) Yes     Comments:  Reviewed health history and diabetes management.  Difficult to assess overall glucose control with limited insulin dose and glucose trend information  Needs abstinence from Crystal Meth, discussed  Reviewed and interpreted tests that I previously ordered.   Ordered appropriate tests for the endocrinology disease management.    Management options discussed and implemented after shared medical decision making with the patient.  T1DM problem is chronic with exacerbation progression, hyperglycemia    Plan:  Discussed general issues with the diabetes diagnosis and management  We discussed the hgbA1c test which reflects previous overall glucose levels or control  Discussed the importance of blood glucose (BG) testing to assess glucose trends  Provided " general overview of the multiple daily injection (MDI) plan using rapid acting mealtime and longacting insulin medications    Recommend:  Continue current Lantus and Novolog MDI insulin injection plan  Avoid missing mealtime (and correction Novolog) insulin doses  Try to use more precise Novolog correction scale as:    mg/dl  No correction   151-200 mg/dl  +1 units   201-250 mg/dl  +2 units   251-300 mg/dl  +3 units   301-350 mg/dl  +4 units   350-400 mg/dl  +5 units   401-450 mg/dl  +6 units and call physician    Would benefit from learning, using insulin-to-carb counting (I:C) method for mealtime insulin dosing.  No labs ordered at this time  Advised checking glucose levels premeal and bedtime  Resume use of the Freestyle Libre2 CGM sensor   Watch YouTube video for instruction, contact us if questions  Plan an appointment with one of our [facility] Diab Educators    Review the MDI treatment plan, use of Novolog sscale   Review his Noam CGM data and provide guidance on mealtime Novolog base-doses, Lantus dosing   Diab Ed Referral placed  Advise having fasting lipid panel testing and dilated eye examination, at least annually    Schedule follow-up general medicine appointment with his PCP  Reminded him to abstain from methamphetamine and other illegal drug use  I have encouraged him to participate with local AA program  Addressed patient questions today    There are no Patient Instructions on file for this visit.    Future labs ordered today:   Orders Placed This Encounter   Procedures     AMB Adult Diabetes Educator Referral     Radiology/Consults ordered today: AMBULATORY ADULT DIABETES EDUCATOR REFERRAL    Total time spent in with the patient evaluation:  22 min  Additional time spent reviewing pertinent lab tests and chart notes, and documentation:  10 min    Follow-up:  1/2023, Return    RON Verdugo MD, MS  Endocrinology  [facility]

## 2022-09-08 ENCOUNTER — TELEPHONE (OUTPATIENT)
Dept: ENDOCRINOLOGY | Facility: CLINIC | Age: 37
End: 2022-09-08

## 2022-09-08 NOTE — TELEPHONE ENCOUNTER
Diabetes Education Scheduling Outreach #1:    Call to patient to schedule. Left message with phone number to call to schedule.    Plan for 2nd outreach attempt within 1 week.    Radha Mercer  [facility]  Diabetes and Nutrition Scheduling

## 2022-10-04 ENCOUNTER — ALLIED HEALTH/NURSE VISIT (OUTPATIENT)
Dept: EDUCATION SERVICES | Facility: CLINIC | Age: 37
End: 2022-10-04
Payer: COMMERCIAL

## 2022-10-04 DIAGNOSIS — E10.9 TYPE 1 DIABETES MELLITUS WITHOUT COMPLICATION (H): ICD-10-CM

## 2022-10-04 PROCEDURE — G0108 DIAB MANAGE TRN  PER INDIV: HCPCS | Performed by: DIETITIAN, REGISTERED

## 2022-10-04 NOTE — PROGRESS NOTES
Diabetes Self-Management Education & Support    Presents for: Individual review    Type of Service: In Person Visit    Assessment Type:   ASSESSMENT:  Patient reports last Libre2 sensor fell off before 14 days were up.  He resumed fingerstick glucose monitoring as it was too early to refill the Libre2 prescription. He was instructed to contact the  for sensor replacement if sensors do not last the full 14 days.  Patient agrees to refill his Libre2 sensors and resume.     Reports taking insulin aspart as 10 units + CF 1/50>150 mg/dL at meals; usually at the start.  Reports adjusting dose based on his experience considering glucose, intake, and activity.  Not always taking insulin aspart for snacks. Reports missing HS Lantus dose about twice a month.  Agrees to learn carbohydrate counting in order to have a more structured approach to taking insulin aspart.  Instructed patient on carbohydrate counting and label reading.  Based on reported intake and with priority to avoid hypoglycemia, patient instructed to use ICR of 1:15.      Brief discussion of diabetes technology options including pumps and Inpen.  Patient not yet ready to proceed due to his reported fears with technology.     Expresses desire to stop smoking.       Patient's most recent   Lab Results   Component Value Date    A1C 8.3 05/04/2022    A1C 7.9 02/14/2020     is not meeting goal of <7.0    Diabetes knowledge and skills assessment:   Patient is knowledgeable in diabetes management concepts related to: Monitoring and Taking Medication    Continue education with the following diabetes management concepts: Healthy Eating, Being Active, Monitoring, Taking Medication, Problem Solving, Reducing Risks and Healthy Coping    Based on learning assessment above, most appropriate setting for further diabetes education would be: Individual setting.      PLAN  1. Take Novolog as ICR 1:15 + CF 1/50>150 mg/dL at the start of each meal.   2.  Continue  "Lantus as 14 unit/day.  3. Resume use of Freestyle Libre2.  4.  Follow up with provider regarding prescription medications for smoking cessation     Topics to cover at upcoming visits: Monitoring, Taking Medication, Problem Solving and Reducing Risks    Follow-up: 11/15/22    See Care Plan for co-developed, patient-state behavior change goals.  AVS provided for patient today.    Education Materials Provided:  Carbohydrate Counting and Reading Nutrition Labels      SUBJECTIVE/OBJECTIVE:  Presents for: Individual review  Accompanied by: Self  Diabetes education in the past 24mo: No  Focus of Visit: Monitoring, Taking Medication  Diabetes type: Type 1  Date of diagnosis: 1st grade  Diabetes management related comments/concerns: Libre2 sensor adhesion, desires stable glucoses  Other concerns:: Other  Cultural Influences/Ethnic Background:   or     Diabetes Symptoms & Complications:     Complications assessed today?: No    Patient Problem List and Family Medical History reviewed for relevant medical history, current medical status, and diabetes risk factors.    Vitals:  There were no vitals taken for this visit.  Estimated body mass index is 27.4 kg/m  as calculated from the following:    Height as of 9/7/22: 1.727 m (5' 8\").    Weight as of 9/7/22: 81.7 kg (180 lb 3.2 oz).   Last 3 BP:   BP Readings from Last 3 Encounters:   09/07/22 117/80   05/12/22 124/76   01/11/22 106/69       History   Smoking Status     Current Every Day Smoker     Packs/day: 0.50   Smokeless Tobacco     Never Used       Labs:  Lab Results   Component Value Date    A1C 8.3 05/04/2022    A1C 7.9 02/14/2020     Lab Results   Component Value Date     05/04/2022     02/14/2020     Lab Results   Component Value Date     05/04/2022     Direct Measure HDL   Date Value Ref Range Status   05/04/2022 47 >=40 mg/dL Final   ]  GFR Estimate   Date Value Ref Range Status   05/04/2022 >90 >60 mL/min/1.73m2 Final     Comment: "     Effective December 21, 2021 eGFRcr in adults is calculated using the 2021 CKD-EPI creatinine equation which includes age and gender (You hedrick al., NEJM, DOI: 10.1056/NQKJqx3293364)   02/14/2020 >90 >60 mL/min/[1.73_m2] Final     Comment:     Non  GFR Calc  Starting 12/18/2018, serum creatinine based estimated GFR (eGFR) will be   calculated using the Chronic Kidney Disease Epidemiology Collaboration   (CKD-EPI) equation.       GFR Estimate If Black   Date Value Ref Range Status   02/14/2020 >90 >60 mL/min/[1.73_m2] Final     Comment:      GFR Calc  Starting 12/18/2018, serum creatinine based estimated GFR (eGFR) will be   calculated using the Chronic Kidney Disease Epidemiology Collaboration   (CKD-EPI) equation.       Lab Results   Component Value Date    CR 0.76 05/04/2022    CR 0.71 02/14/2020     No results found for: MICROALBUMIN    Healthy Eating:  Healthy Eating Assessed Today: Yes  Meal planning/habits: Frequent snacking  How many times a week on average do you eat food made away from home (restaurant/take-out)?: 5+  Meals include: Breakfast, Lunch, Dinner, Evening Snack, Afternoon Snack  Breakfast: 2 bowls Rice Krispies, banana, small glass milk - 12 units Novolog  Lunch: chicken nuggets, fries, diet soda - 8 units Novolog  Dinner: oatmeal OR hotdish OR skips - 10 units Novolog  Snacks: candy, popcorn, cheddar crackers, fruit, yogurt  Beverages: Diet soda, Energy drinks, Water, Milk    Being Active:  Being Active Assessed Today: No    Monitoring:  Monitoring Assessed Today: Yes  Did patient bring glucose meter to appointment? : Yes  Blood Glucose Meter: Accu-chek  Times checking blood sugar at home (number): 4  Times checking blood sugar at home (per): Day  Blood glucose trend: Fluctuating        Taking Medications:  Diabetes Medication(s)     Insulin       insulin aspart (NOVOLOG VIAL) 100 UNITS/ML vial    INJECT 8 TO 10 UNITS UNDER THE SKIN AT MEALTIME AS DIRECTED**  TOTAL DAILY DOSE OF 35 UNITS     insulin glargine (LANTUS VIAL) 100 UNIT/ML vial    Inject 14 units subcutaneous daily as directed     insulin regular (HUMULIN R/NOVOLIN R VIAL) 100 UNIT/ML vial    Inject 8-10 units subcutaneous with meals as directed, total daily dose approx 35 units; Relion Regular insulin     Patient not taking: Reported on 5/12/2022          Taking Medication Assessed Today: Yes  Current Treatments: Insulin Injections  Dose schedule: Pre-breakfast, Pre-lunch, Pre-dinner, At bedtime  Given by: Patient  Problems taking diabetes medications regularly?: Yes  Diabetes medication side effects?: Yes    Problem Solving:  Problem Solving Assessed Today: No  Is the patient at risk for hypoglycemia?: Yes  Hypoglycemia Frequency: Weekly  Hypoglycemia Treatment: Candy  Is the patient at risk for DKA?: Yes    Hypoglycemia symptoms  Mood changes: Yes     Reducing Risks:  Reducing Risks Assessed Today: No  Diabetes Risks: Ethnicity  CAD Risks: Tobacco exposure, Diabetes Mellitus, Male sex    Healthy Coping:  Healthy Coping Assessed Today: No  Emotional response to diabetes: Ready to learn  Informal Support system:: Family, Significant other  Stage of change: CONTEMPLATION (Considering change and yet undecided)  Support resources: Other, Websites  Patient Activation Measure Survey Score:  No flowsheet data found.      Care Plan and Education Provided:  Care Plan: Diabetes   Updates made by Vickie Garcia RD since 10/4/2022 12:00 AM      Problem: HbA1C Not In Goal       Goal: Establish Regular Follow-Ups with PCP       Task: Discuss with PCP the recommended timing for patient's next follow up visit(s)    Responsible User: Vickie Garcia RD      Task: Discuss schedule for PCP visits with patient    Responsible User: Vickie Garcia RD      Goal: Get HbA1C Level in Goal       Task: Educate patient on diabetes education self-management topics    Responsible User: Vickie Garcia RD      Task: Educate patient on  benefits of regular glucose monitoring    Responsible User: Vickie Garcia RD      Task: Refer patient to appropriate extended care team member, as needed (Medication Therapy Management, Behavioral Health, Physical Therapy, etc.)    Responsible User: Vickie Garcia RD      Task: Discuss diabetes treatment plan with patient    Responsible User: Vickie Garcia RD      Problem: Diabetes Self-Management Education Needed to Optimize Self-Care Behaviors       Goal: Understand diabetes pathophysiology and disease progression       Task: Provide education on diabetes pathophysiology and disease progression specfic to patient's diabetes type    Responsible User: Vickie Garcia RD      Goal: Healthy Eating - follow a healthy eating pattern for diabetes       Task: Provide education on portion control and consistency in amount, composition and timing of food intake    Responsible User: Vickie Garcia RD      Task: Provide education on managing carbohydrate intake (carbohydrate counting, plate planning method, etc.) Completed 10/4/2022   Responsible User: Vickie Garcia RD      Task: Provide education on weight management    Responsible User: Vickie Garcia RD      Task: Provide education on heart healthy eating    Responsible User: Vickie Garcia RD      Task: Provide education on eating out    Responsible User: Vickie Garcia RD      Task: Develop individualized healthy eating plan with patient    Responsible User: Vickie Garcia RD      Goal: Being Active - get regular physical activity, working up to at least 150 minutes per week       Task: Provide education on relationship of activity to glucose and precautions to take if at risk for low glucose    Responsible User: Vickie Garcia RD      Task: Discuss barriers to physical activity with patient    Responsible User: Vickie Garcia RD      Task: Develop physical activity plan with patient    Responsible User: Vickie Garcia RD      Task: Explore community resources  including walking groups, assistance programs, and home videos    Responsible User: Vickie Garcia RD      Goal: Monitoring - monitor glucose and ketones as directed       Task: Provide education on blood glucose monitoring (purpose, proper technique, frequency, glucose targets, interpreting results, when to use glucose control solution, sharps disposal)    Responsible User: Vickie Garcia RD      Task: Provide education on continuous glucose monitoring (sensor placement, use of eduin or /reader, understanding glucose trends, alerts and alarms, differences between sensor glucose and blood glucose)    Responsible User: Vickie Garcia RD      Task: Provide education on ketone monitoring (when to monitor, frequency, etc.)    Responsible User: Vickie Garcia RD      Goal: Taking Medication - patient is consistently taking medications as directed    Start Date: 10/4/2022   This Visit's Progress: 0%   Note:    Take insulin aspart as ICR 1:15 + >150 mg/dL at the start of each meal.      Task: Provide education on action of prescribed medication, including when to take and possible side effects Completed 10/4/2022   Responsible User: Vickie Garcia RD      Task: Provide education on insulin and injectable diabetes medications, including administration, storage, site selection and rotation for injection sites    Responsible User: Vickie Garcia RD      Task: Discuss barriers to medication adherence with patient and provide management technique ideas as appropriate    Responsible User: Vickie Garcia RD      Task: Provide education on frequency and refill details of medications    Responsible User: Vickie Garcia RD      Goal: Problem Solving - know how to prevent and manage short-term diabetes complications       Task: Provide education on high blood glucose - causes, signs/symptoms, prevention and treatment    Responsible User: Vickie Garcia RD      Task: Provide education on low blood glucose - causes,  signs/symptoms, prevention, treatment, carrying a carbohydrate source at all times, and medical identification    Responsible User: Vickie Garcia RD      Task: Provide education on safe travel with diabetes    Responsible User: Vickie Garcia RD      Task: Provide education on how to care for diabetes on sick days    Responsible User: Vickie Garcia RD      Task: Provide education on when to call a health care provider    Responsible User: Vickie Garcia RD      Goal: Reducing Risks - know how to prevent and treat long-term diabetes complications       Task: Provide education on major complications of diabetes, prevention, early diagnostic measures and treatment of complications    Responsible User: Vickie Garcia RD      Task: Provide education on recommended care for dental, eye and foot health    Responsible User: Vickie Garcia RD      Task: Provide education on Hemoglobin A1c - goals and relationship to blood glucose levels    Responsible User: Vickie Garcia RD      Task: Provide education on recommendations for heart health - lipid levels and goals, blood pressure and goals, and aspirin therapy, if indicated    Responsible User: Vickie Garcia RD      Task: Provide education on tobacco cessation Completed 10/4/2022   Responsible User: Vickie Garcia RD      Goal: Healthy Coping - use available resources to cope with the challenges of managing diabetes       Task: Discuss recognizing feelings about having diabetes    Responsible User: Vickie Garcia RD      Task: Provide education on the benefits of making appropriate lifestyle changes    Responsible User: Vickie Garcia RD      Task: Provide education on benefits of utilizing support systems    Responsible User: Vickie Garcia RD      Task: Discuss methods for coping with stress    Responsible User: Vickie Garcia RD      Task: Provide education on when to seek professional counseling    Responsible User: Vickie Garcia RD Elise Graham, RD, PALAK,  CDCES     Time Spent: 60 minutes  Encounter Type: Individual    Any diabetes medication dose changes were made via the CDE Protocol per the patient's endocrinology provider. A copy of this encounter was shared with the provider.

## 2022-10-04 NOTE — PATIENT INSTRUCTIONS
Novolog   -take 1 unit for each 15 grams of carbohydrate    Determine total grams of carbohydrate and divide by 15 to determine Novolog amount  -try to take 10 minutes before the start of each meal  - Continue sliding scale insulin dosing:   If blood glucose is: Add extra Humalog/Novolog   150-200 1 unit   201-250 2 units   251-300 3 units   301-350 4 units   351-400 5 units   401-450 6 units   451-500 7 units       2.  Use resources to count carbohydrates   -handout   -nutrition labels   -CalorieKing web site or eduin    3. Lantus: continue 14 units once a day    4. Resume using the Freestyle Libre2 - you have refills at the pharmacy   -scan before each meal and before bed   -you can scan as much as you want   -If you have any issues with your sensors, call [employer] customer support at [phone]

## 2022-10-04 NOTE — LETTER
10/4/2022         RE: Olman Newby  [address] [place] [address]        Dear Colleague,    Thank you for referring your patient, Olman Newby, to the [facility]. Please see a copy of my visit note below.    Diabetes Self-Management Education & Support    Presents for: Individual review    Type of Service: In Person Visit    Assessment Type:   ASSESSMENT:  Patient reports last Libre2 sensor fell off before 14 days were up.  He resumed fingerstick glucose monitoring as it was too early to refill the Libre2 prescription. He was instructed to contact the  for sensor replacement if sensors do not last the full 14 days.  Patient agrees to refill his Libre2 sensors and resume.     Reports taking insulin aspart as 10 units + CF 1/50>150 mg/dL at meals; usually at the start.  Reports adjusting dose based on his experience considering glucose, intake, and activity.  Not always taking insulin aspart for snacks. Reports missing HS Lantus dose about twice a month.  Agrees to learn carbohydrate counting in order to have a more structured approach to taking insulin aspart.  Instructed patient on carbohydrate counting and label reading.  Based on reported intake and with priority to avoid hypoglycemia, patient instructed to use ICR of 1:15.      Brief discussion of diabetes technology options including pumps and Inpen.  Patient not yet ready to proceed due to his reported fears with technology.     Expresses desire to stop smoking.       Patient's most recent   Lab Results   Component Value Date    A1C 8.3 05/04/2022    A1C 7.9 02/14/2020     is not meeting goal of <7.0    Diabetes knowledge and skills assessment:   Patient is knowledgeable in diabetes management concepts related to: Monitoring and Taking Medication    Continue education with the following diabetes management concepts: Healthy Eating, Being Active, Monitoring, Taking Medication, Problem  "Solving, Reducing Risks and Healthy Coping    Based on learning assessment above, most appropriate setting for further diabetes education would be: Individual setting.      PLAN  1. Take Novolog as ICR 1:15 + CF 1/50>150 mg/dL at the start of each meal.   2.  Continue Lantus as 14 unit/day.  3. Resume use of Freestyle Libre2.  4.  Follow up with provider regarding prescription medications for smoking cessation     Topics to cover at upcoming visits: Monitoring, Taking Medication, Problem Solving and Reducing Risks    Follow-up: 11/15/22    See Care Plan for co-developed, patient-state behavior change goals.  AVS provided for patient today.    Education Materials Provided:  Carbohydrate Counting and Reading Nutrition Labels      SUBJECTIVE/OBJECTIVE:  Presents for: Individual review  Accompanied by: Self  Diabetes education in the past 24mo: No  Focus of Visit: Monitoring, Taking Medication  Diabetes type: Type 1  Date of diagnosis: 1st grade  Diabetes management related comments/concerns: Libre2 sensor adhesion, desires stable glucoses  Other concerns:: Other  Cultural Influences/Ethnic Background:   or     Diabetes Symptoms & Complications:     Complications assessed today?: No    Patient Problem List and Family Medical History reviewed for relevant medical history, current medical status, and diabetes risk factors.    Vitals:  There were no vitals taken for this visit.  Estimated body mass index is 27.4 kg/m  as calculated from the following:    Height as of 9/7/22: 1.727 m (5' 8\").    Weight as of 9/7/22: 81.7 kg (180 lb 3.2 oz).   Last 3 BP:   BP Readings from Last 3 Encounters:   09/07/22 117/80   05/12/22 124/76   01/11/22 106/69       History   Smoking Status     Current Every Day Smoker     Packs/day: 0.50   Smokeless Tobacco     Never Used       Labs:  Lab Results   Component Value Date    A1C 8.3 05/04/2022    A1C 7.9 02/14/2020     Lab Results   Component Value Date     05/04/2022    "  02/14/2020     Lab Results   Component Value Date     05/04/2022     Direct Measure HDL   Date Value Ref Range Status   05/04/2022 47 >=40 mg/dL Final   ]  GFR Estimate   Date Value Ref Range Status   05/04/2022 >90 >60 mL/min/1.73m2 Final     Comment:     Effective December 21, 2021 eGFRcr in adults is calculated using the 2021 CKD-EPI creatinine equation which includes age and gender (You et al., [place], DOI: 10.[facility]6/[id number])   02/14/2020 >90 >60 mL/min/[1.73_m2] Final     Comment:     Non  GFR Calc  Starting 12/18/2018, serum creatinine based estimated GFR (eGFR) will be   calculated using the Chronic Kidney Disease Epidemiology Collaboration   (CKD-EPI) equation.       GFR Estimate If Black   Date Value Ref Range Status   02/14/2020 >90 >60 mL/min/[1.73_m2] Final     Comment:      GFR Calc  Starting 12/18/2018, serum creatinine based estimated GFR (eGFR) will be   calculated using the Chronic Kidney Disease Epidemiology Collaboration   (CKD-EPI) equation.       Lab Results   Component Value Date    CR 0.76 05/04/2022    CR 0.71 02/14/2020     No results found for: MICROALBUMIN    Healthy Eating:  Healthy Eating Assessed Today: Yes  Meal planning/habits: Frequent snacking  How many times a week on average do you eat food made away from home (restaurant/take-out)?: 5+  Meals include: Breakfast, Lunch, Dinner, Evening Snack, Afternoon Snack  Breakfast: 2 bowls Rice Krispies, banana, small glass milk - 12 units Novolog  Lunch: chicken nuggets, fries, diet soda - 8 units Novolog  Dinner: oatmeal OR hotdish OR skips - 10 units Novolog  Snacks: candy, popcorn, cheddar crackers, fruit, yogurt  Beverages: Diet soda, Energy drinks, Water, Milk    Being Active:  Being Active Assessed Today: No    Monitoring:  Monitoring Assessed Today: Yes  Did patient bring glucose meter to appointment? : Yes  Blood Glucose Meter: Accu-chek  Times checking blood sugar at home (number):  4  Times checking blood sugar at home (per): Day  Blood glucose trend: Fluctuating        Taking Medications:  Diabetes Medication(s)     Insulin       insulin aspart (NOVOLOG VIAL) 100 UNITS/ML vial    INJECT 8 TO 10 UNITS UNDER THE SKIN AT MEALTIME AS DIRECTED** TOTAL DAILY DOSE OF 35 UNITS     insulin glargine (LANTUS VIAL) 100 UNIT/ML vial    Inject 14 units subcutaneous daily as directed     insulin regular (HUMULIN R/NOVOLIN R VIAL) 100 UNIT/ML vial    Inject 8-10 units subcutaneous with meals as directed, total daily dose approx 35 units; Relion Regular insulin     Patient not taking: Reported on 5/12/2022          Taking Medication Assessed Today: Yes  Current Treatments: Insulin Injections  Dose schedule: Pre-breakfast, Pre-lunch, Pre-dinner, At bedtime  Given by: Patient  Problems taking diabetes medications regularly?: Yes  Diabetes medication side effects?: Yes    Problem Solving:  Problem Solving Assessed Today: No  Is the patient at risk for hypoglycemia?: Yes  Hypoglycemia Frequency: Weekly  Hypoglycemia Treatment: Candy  Is the patient at risk for DKA?: Yes    Hypoglycemia symptoms  Mood changes: Yes     Reducing Risks:  Reducing Risks Assessed Today: No  Diabetes Risks: Ethnicity  CAD Risks: Tobacco exposure, Diabetes Mellitus, Male sex    Healthy Coping:  Healthy Coping Assessed Today: No  Emotional response to diabetes: Ready to learn  Informal Support system:: Family, Significant other  Stage of change: CONTEMPLATION (Considering change and yet undecided)  Support resources: Other, Websites  Patient Activation Measure Survey Score:  No flowsheet data found.      Care Plan and Education Provided:  Care Plan: Diabetes   Updates made by Vickie Garcia RD since 10/4/2022 12:00 AM      Problem: HbA1C Not In Goal       Goal: Establish Regular Follow-Ups with PCP       Task: Discuss with PCP the recommended timing for patient's next follow up visit(s)    Responsible User: Vickie Garcia RD      Task:  Discuss schedule for PCP visits with patient    Responsible User: Vickie Garcia RD      Goal: Get HbA1C Level in Goal       Task: Educate patient on diabetes education self-management topics    Responsible User: Vickie Garcia RD      Task: Educate patient on benefits of regular glucose monitoring    Responsible User: Vickie Garcia RD      Task: Refer patient to appropriate extended care team member, as needed (Medication Therapy Management, Behavioral Health, Physical Therapy, etc.)    Responsible User: Vickie Garcia RD      Task: Discuss diabetes treatment plan with patient    Responsible User: Vickie Garcia RD      Problem: Diabetes Self-Management Education Needed to Optimize Self-Care Behaviors       Goal: Understand diabetes pathophysiology and disease progression       Task: Provide education on diabetes pathophysiology and disease progression specfic to patient's diabetes type    Responsible User: Vickie Garcia RD      Goal: Healthy Eating - follow a healthy eating pattern for diabetes       Task: Provide education on portion control and consistency in amount, composition and timing of food intake    Responsible User: Vickie Garcia RD      Task: Provide education on managing carbohydrate intake (carbohydrate counting, plate planning method, etc.) Completed 10/4/2022   Responsible User: Vickie Garcia RD      Task: Provide education on weight management    Responsible User: Vickie Garcia RD      Task: Provide education on heart healthy eating    Responsible User: Vickie Garcia RD      Task: Provide education on eating out    Responsible User: Vickie Garcia RD      Task: Develop individualized healthy eating plan with patient    Responsible User: Vickie Garcia RD      Goal: Being Active - get regular physical activity, working up to at least 150 minutes per week       Task: Provide education on relationship of activity to glucose and precautions to take if at risk for low glucose    Responsible User:  Vickie Garcia RD      Task: Discuss barriers to physical activity with patient    Responsible User: Vickie Garcia RD      Task: Develop physical activity plan with patient    Responsible User: Vickie Garcia RD      Task: Explore community resources including walking groups, assistance programs, and home videos    Responsible User: Vickie Garcia RD      Goal: Monitoring - monitor glucose and ketones as directed       Task: Provide education on blood glucose monitoring (purpose, proper technique, frequency, glucose targets, interpreting results, when to use glucose control solution, sharps disposal)    Responsible User: Vickie Garcia RD      Task: Provide education on continuous glucose monitoring (sensor placement, use of eduin or /reader, understanding glucose trends, alerts and alarms, differences between sensor glucose and blood glucose)    Responsible User: Vickie Garcia RD      Task: Provide education on ketone monitoring (when to monitor, frequency, etc.)    Responsible User: Vickie Garcia RD      Goal: Taking Medication - patient is consistently taking medications as directed    Start Date: 10/4/2022   This Visit's Progress: 0%   Note:    Take insulin aspart as ICR 1:15 + >150 mg/dL at the start of each meal.      Task: Provide education on action of prescribed medication, including when to take and possible side effects Completed 10/4/2022   Responsible User: Vickie Garcia RD      Task: Provide education on insulin and injectable diabetes medications, including administration, storage, site selection and rotation for injection sites    Responsible User: Vickie Garcia RD      Task: Discuss barriers to medication adherence with patient and provide management technique ideas as appropriate    Responsible User: Vickie Garcia RD      Task: Provide education on frequency and refill details of medications    Responsible User: Vickie Garcia RD      Goal: Problem Solving - know how to prevent  and manage short-term diabetes complications       Task: Provide education on high blood glucose - causes, signs/symptoms, prevention and treatment    Responsible User: Vickie Garcia RD      Task: Provide education on low blood glucose - causes, signs/symptoms, prevention, treatment, carrying a carbohydrate source at all times, and medical identification    Responsible User: Vickie Garcia RD      Task: Provide education on safe travel with diabetes    Responsible User: Vickie Garcia RD      Task: Provide education on how to care for diabetes on sick days    Responsible User: Vickie Garcia RD      Task: Provide education on when to call a health care provider    Responsible User: Vickie Garcia RD      Goal: Reducing Risks - know how to prevent and treat long-term diabetes complications       Task: Provide education on major complications of diabetes, prevention, early diagnostic measures and treatment of complications    Responsible User: Vickie Garcia RD      Task: Provide education on recommended care for dental, eye and foot health    Responsible User: Vickie Garcia RD      Task: Provide education on Hemoglobin A1c - goals and relationship to blood glucose levels    Responsible User: Vickie Garcia RD      Task: Provide education on recommendations for heart health - lipid levels and goals, blood pressure and goals, and aspirin therapy, if indicated    Responsible User: Vickie Garcia RD      Task: Provide education on tobacco cessation Completed 10/4/2022   Responsible User: Vickie Garcia RD      Goal: Healthy Coping - use available resources to cope with the challenges of managing diabetes       Task: Discuss recognizing feelings about having diabetes    Responsible User: Vickie Garcia RD      Task: Provide education on the benefits of making appropriate lifestyle changes    Responsible User: Vickie Garcia RD      Task: Provide education on benefits of utilizing support systems    Responsible User:  Vickie Garcia RD      Task: Discuss methods for coping with stress    Responsible User: Vickie Garcia RD      Task: Provide education on when to seek professional counseling    Responsible User: Vickie Garcia RD Elise Graham, RD, LD, [facility]     Time Spent: 60 minutes  Encounter Type: Individual    Any diabetes medication dose changes were made via the CDE Protocol per the patient's endocrinology provider. A copy of this encounter was shared with the provider.

## 2022-11-15 ENCOUNTER — ALLIED HEALTH/NURSE VISIT (OUTPATIENT)
Dept: EDUCATION SERVICES | Facility: CLINIC | Age: 37
End: 2022-11-15
Payer: COMMERCIAL

## 2022-11-15 DIAGNOSIS — E10.9 TYPE 1 DIABETES MELLITUS WITHOUT COMPLICATION (H): Primary | ICD-10-CM

## 2022-11-15 PROCEDURE — G0108 DIAB MANAGE TRN  PER INDIV: HCPCS | Performed by: DIETITIAN, REGISTERED

## 2022-11-15 NOTE — PROGRESS NOTES
Diabetes Self-Management Education & Support    Presents for: CGM Review    Type of Service: In Person Visit    Assessment Type:   ASSESSMENT:  Olman has not yet started carb counting, lost  sliding scale insulin table provided at last visit. Was not wearing Noam 2 as he misplaced sensors. Continues to take variable amounts of insulin, based on what he thinks he should take so difficult to make many adjustments. He did not download eduin recommended at last visit. Reviewed need to find some guidelines for insulin dosing, in order to better adjust in the future. He feels he needs to keep a log -  Provided a logbook to patient to take notes and showed him how to enter insulin and/or carbs, notes in the Noam 2 reader.     Patient's most recent   Lab Results   Component Value Date    A1C 8.3 05/04/2022    A1C 7.9 02/14/2020     is not meeting goal of <7.0    Diabetes knowledge and skills assessment:   Patient is knowledgeable in diabetes management concepts related to: Monitoring and Taking Medication    Continue education with the following diabetes management concepts: Healthy Eating, Being Active, Monitoring, Taking Medication, Problem Solving, Reducing Risks and Healthy Coping    Based on learning assessment above, most appropriate setting for further diabetes education would be: Group class or Individual setting.      PLAN    Work on set mealtime dosing of insulin to develop more of a dosing structure:   New Novolog dosing:   Small meal = 5-7 units  Medium meal = 8-10 units  Large meal = 13-15 units  Snack = 3-4 units  + sliding scale insulin 1unit:50 >150 mg/dL  Keep track of insulin dosing in logbook OR in Noam 2 reader     Topics to cover at upcoming visits: Healthy Eating, Being Active, Monitoring, Taking Medication, Problem Solving, Reducing Risks and Healthy Coping    Follow-up: 1/31/23    See Care Plan for co-developed, patient-state behavior change goals.  AVS provided for patient today.    Education  "Materials Provided:  Carbohydrate Counting      SUBJECTIVE/OBJECTIVE:  Presents for: CGM Review  Accompanied by: Self  Diabetes education in the past 24mo: Yes  Focus of Visit: Monitoring, Taking Medication, CGM  Type of CGM visit: Personal CGM Follow-up  Diabetes type: Type 1  Date of diagnosis: 1st grade  Disease course: Getting harder to manage  How confident are you filling out medical forms by yourself:: Not Assessed  Diabetes management related comments/concerns: Finally called to get replacement sensor but then missplaced it. Did not start carb counting, lost paperwork with SSI table.  Transportation concerns: No  Difficulty affording diabetes medication?: No  Difficulty affording diabetes testing supplies?: Sometimes  Other concerns:: Other, None  Cultural Influences/Ethnic Background:   or       Diabetes Symptoms & Complications:     Complications assessed today?: No    Patient Problem List and Family Medical History reviewed for relevant medical history, current medical status, and diabetes risk factors.    Vitals:  There were no vitals taken for this visit.  Estimated body mass index is 27.4 kg/m  as calculated from the following:    Height as of 9/7/22: 1.727 m (5' 8\").    Weight as of 9/7/22: 81.7 kg (180 lb 3.2 oz).   Last 3 BP:   BP Readings from Last 3 Encounters:   09/07/22 117/80   05/12/22 124/76   01/11/22 106/69       History   Smoking Status     Every Day     Packs/day: 0.50   Smokeless Tobacco     Never       Labs:  Lab Results   Component Value Date    A1C 8.3 05/04/2022    A1C 7.9 02/14/2020     Lab Results   Component Value Date     05/04/2022     02/14/2020     Lab Results   Component Value Date     05/04/2022     Direct Measure HDL   Date Value Ref Range Status   05/04/2022 47 >=40 mg/dL Final   ]  GFR Estimate   Date Value Ref Range Status   05/04/2022 >90 >60 mL/min/1.73m2 Final     Comment:     Effective December 21, 2021 eGFRcr in adults is " calculated using the 2021 CKD-EPI creatinine equation which includes age and gender (You et al., NEJ, DOI: 10.1056/RRZKuj9028857)   02/14/2020 >90 >60 mL/min/[1.73_m2] Final     Comment:     Non  GFR Calc  Starting 12/18/2018, serum creatinine based estimated GFR (eGFR) will be   calculated using the Chronic Kidney Disease Epidemiology Collaboration   (CKD-EPI) equation.       GFR Estimate If Black   Date Value Ref Range Status   02/14/2020 >90 >60 mL/min/[1.73_m2] Final     Comment:      GFR Calc  Starting 12/18/2018, serum creatinine based estimated GFR (eGFR) will be   calculated using the Chronic Kidney Disease Epidemiology Collaboration   (CKD-EPI) equation.       Lab Results   Component Value Date    CR 0.76 05/04/2022    CR 0.71 02/14/2020     No results found for: MICROALBUMIN    Healthy Eating:  Healthy Eating Assessed Today: Yes  Cultural/Congregation diet restrictions?: No  Meal planning/habits: Frequent snacking, None  How many times a week on average do you eat food made away from home (restaurant/take-out)?: 5+  Meals include: Breakfast, Lunch, Dinner, Evening Snack, Afternoon Snack  Breakfast: 2 bowls Rice Krispies, banana, small glass milk - 12 units Novolog OR Oatmeal w/ brown sugar - 13-15 units  Lunch: chicken nuggets, fries, diet soda - 8 units Novolog OR TV dinner, lee crackers, yogurt - 5 units OR Chipotle bowl with extra rice - 8 units  Dinner: oatmeal OR hotdish OR skips - 10 units Novolog OR Peanut butter & jelly sandwich, chicken noodle soup - 10 units  Snacks: candy, popcorn, cheddar crackers, fruit, yogurt  Beverages: Diet soda, Energy drinks, Water, Milk    Being Active:  Being Active Assessed Today: No    Monitoring:  Monitoring Assessed Today: Yes  Did patient bring glucose meter to appointment? : Yes  Blood Glucose Meter: Accu-chek  Times checking blood sugar at home (number): 4  Times checking blood sugar at home (per): Day  Blood glucose trend:  Fluctuating              Taking Medications:  Diabetes Medication(s)     Insulin       insulin aspart (NOVOLOG VIAL) 100 UNITS/ML vial    INJECT 8 TO 10 UNITS UNDER THE SKIN AT MEALTIME AS DIRECTED** TOTAL DAILY DOSE OF 35 UNITS     insulin glargine (LANTUS VIAL) 100 UNIT/ML vial    Inject 14 units subcutaneous daily as directed     insulin regular (HUMULIN R/NOVOLIN R VIAL) 100 UNIT/ML vial    Inject 8-10 units subcutaneous with meals as directed, total daily dose approx 35 units; Relion Regular insulin     Patient not taking: Reported on 5/12/2022          Taking Medication Assessed Today: Yes  Current Treatments: Insulin Injections  Dose schedule: Pre-breakfast, Pre-lunch, Pre-dinner, At bedtime  Given by: Patient  Problems taking diabetes medications regularly?: Yes  Diabetes medication side effects?: Yes    Problem Solving:  Problem Solving Assessed Today: No  Is the patient at risk for hypoglycemia?: Yes  Hypoglycemia Frequency: Weekly  Hypoglycemia Treatment: Candy  Is the patient at risk for DKA?: Yes    Hypoglycemia symptoms  Mood changes: Yes         Reducing Risks:  Reducing Risks Assessed Today: No  Diabetes Risks: Ethnicity  CAD Risks: Tobacco exposure, Diabetes Mellitus, Male sex    Healthy Coping:  Healthy Coping Assessed Today: Yes  Emotional response to diabetes: Ready to learn, Concern for health and well-being, Fear/Anxiety  Informal Support system:: Family, Significant other  Stage of change: PREPARATION (Decided to change - considering how)  Support resources: Other, Websites  Patient Activation Measure Survey Score:  No flowsheet data found.      Care Plan and Education Provided:  Care Plan: Diabetes   Updates made by Vickie Garcia RD since 11/15/2022 12:00 AM      Problem: Diabetes Self-Management Education Needed to Optimize Self-Care Behaviors       Goal: Healthy Eating - follow a healthy eating pattern for diabetes       Task: Provide education on portion control and consistency in amount,  composition and timing of food intake Completed 11/15/2022   Responsible User: Vickie Garcia RD      Goal: Monitoring - monitor glucose and ketones as directed       Task: Provide education on continuous glucose monitoring (sensor placement, use of eduin or /reader, understanding glucose trends, alerts and alarms, differences between sensor glucose and blood glucose) Completed 11/15/2022   Responsible User: Vickie Garcia RD      Goal: Taking Medication - patient is consistently taking medications as directed    Start Date: 10/4/2022   This Visit's Progress: 0%   Recent Progress: 0%   Note:    Take insulin aspart as ICR 1:15 + >150 mg/dL at the start of each meal.          Vickie Garcia RD, LD, [facility]     Time Spent: 45 minutes  Encounter Type: Individual    Any diabetes medication dose changes were made via the CDE Protocol per the patient's referring provider. A copy of this encounter was shared with the provider.

## 2022-11-15 NOTE — PATIENT INSTRUCTIONS
New Novolog dosing:   Small meal = 5-7 units  Medium meal = 8-10 units  Large meal = 13-15 units  Snack = 3-4 units    If blood glucose is: Add extra Humalog/Novolog   150-200 1 unit   201-250 2 units   251-300 3 units   301-350 4 units   351-400 5 units   401-450 6 units   451-500 7 units     Only take extra insulin every 3-4 hours     Write down your insulin dosing in your logbook OR enter it into your Noam 2 reader     Keep scanning your Noam 2 frequently - you can't scan too much!    Call or send a Vusay message with any questions or concerns    Vickie Garcia RD, LD, [facility]   Diabetes Education Triage Line: [phone]  Diabetes Education Appointment [phone]

## 2022-11-15 NOTE — LETTER
11/15/2022         RE: Olman Newby  [address] [place] [address]        Dear Colleague,    Thank you for referring your patient, Olman Newby, to the [facility]. Please see a copy of my visit note below.    Diabetes Self-Management Education & Support    Presents for: CGM Review    Type of Service: In Person Visit    Assessment Type:   ASSESSMENT:  Olman has not yet started carb counting, lost  sliding scale insulin table provided at last visit. Was not wearing Noam 2 as he misplaced sensors. Continues to take variable amounts of insulin, based on what he thinks he should take so difficult to make many adjustments. He did not download eduin recommended at last visit. Reviewed need to find some guidelines for insulin dosing, in order to better adjust in the future. He feels he needs to keep a log -  Provided a logbook to patient to take notes and showed him how to enter insulin and/or carbs, notes in the Noam 2 reader.     Patient's most recent   Lab Results   Component Value Date    A1C 8.3 05/04/2022    A1C 7.9 02/14/2020     is not meeting goal of <7.0    Diabetes knowledge and skills assessment:   Patient is knowledgeable in diabetes management concepts related to: Monitoring and Taking Medication    Continue education with the following diabetes management concepts: Healthy Eating, Being Active, Monitoring, Taking Medication, Problem Solving, Reducing Risks and Healthy Coping    Based on learning assessment above, most appropriate setting for further diabetes education would be: Group class or Individual setting.      PLAN    Work on set mealtime dosing of insulin to develop more of a dosing structure:   New Novolog dosing:   Small meal = 5-7 units  Medium meal = 8-10 units  Large meal = 13-15 units  Snack = 3-4 units  + sliding scale insulin 1unit:50 >150 mg/dL  Keep track of insulin dosing in logbook OR in Noam 2 reader     Topics to cover at  "upcoming visits: Healthy Eating, Being Active, Monitoring, Taking Medication, Problem Solving, Reducing Risks and Healthy Coping    Follow-up: 1/31/23    See Care Plan for co-developed, patient-state behavior change goals.  AVS provided for patient today.    Education Materials Provided:  Carbohydrate Counting      SUBJECTIVE/OBJECTIVE:  Presents for: CGM Review  Accompanied by: Self  Diabetes education in the past 24mo: Yes  Focus of Visit: Monitoring, Taking Medication, CGM  Type of CGM visit: Personal CGM Follow-up  Diabetes type: Type 1  Date of diagnosis: 1st grade  Disease course: Getting harder to manage  How confident are you filling out medical forms by yourself:: Not Assessed  Diabetes management related comments/concerns: Finally called to get replacement sensor but then missplaced it. Did not start carb counting, lost paperwork with SSI table.  Transportation concerns: No  Difficulty affording diabetes medication?: No  Difficulty affording diabetes testing supplies?: Sometimes  Other concerns:: Other, None  Cultural Influences/Ethnic Background:   or       Diabetes Symptoms & Complications:     Complications assessed today?: No    Patient Problem List and Family Medical History reviewed for relevant medical history, current medical status, and diabetes risk factors.    Vitals:  There were no vitals taken for this visit.  Estimated body mass index is 27.4 kg/m  as calculated from the following:    Height as of 9/7/22: 1.727 m (5' 8\").    Weight as of 9/7/22: 81.7 kg (180 lb 3.2 oz).   Last 3 BP:   BP Readings from Last 3 Encounters:   09/07/22 117/80   05/12/22 124/76   01/11/22 106/69       History   Smoking Status     Every Day     Packs/day: 0.50   Smokeless Tobacco     Never       Labs:  Lab Results   Component Value Date    A1C 8.3 05/04/2022    A1C 7.9 02/14/2020     Lab Results   Component Value Date     05/04/2022     02/14/2020     Lab Results   Component Value Date    "  05/04/2022     Direct Measure HDL   Date Value Ref Range Status   05/04/2022 47 >=40 mg/dL Final   ]  GFR Estimate   Date Value Ref Range Status   05/04/2022 >90 >60 mL/min/1.73m2 Final     Comment:     Effective December 21, 2021 eGFRcr in adults is calculated using the 2021 CKD-EPI creatinine equation which includes age and gender (You et al., NE, DOI: 10.1056/YEBXxe3055872)   02/14/2020 >90 >60 mL/min/[1.73_m2] Final     Comment:     Non  GFR Calc  Starting 12/18/2018, serum creatinine based estimated GFR (eGFR) will be   calculated using the Chronic Kidney Disease Epidemiology Collaboration   (CKD-EPI) equation.       GFR Estimate If Black   Date Value Ref Range Status   02/14/2020 >90 >60 mL/min/[1.73_m2] Final     Comment:      GFR Calc  Starting 12/18/2018, serum creatinine based estimated GFR (eGFR) will be   calculated using the Chronic Kidney Disease Epidemiology Collaboration   (CKD-EPI) equation.       Lab Results   Component Value Date    CR 0.76 05/04/2022    CR 0.71 02/14/2020     No results found for: MICROALBUMIN    Healthy Eating:  Healthy Eating Assessed Today: Yes  Cultural/Orthodox diet restrictions?: No  Meal planning/habits: Frequent snacking, None  How many times a week on average do you eat food made away from home (restaurant/take-out)?: 5+  Meals include: Breakfast, Lunch, Dinner, Evening Snack, Afternoon Snack  Breakfast: 2 bowls Rice Krispies, banana, small glass milk - 12 units Novolog OR Oatmeal w/ brown sugar - 13-15 units  Lunch: chicken nuggets, fries, diet soda - 8 units Novolog OR TV dinner, lee crackers, yogurt - 5 units OR Chipotle bowl with extra rice - 8 units  Dinner: oatmeal OR hotdish OR skips - 10 units Novolog OR Peanut butter & jelly sandwich, chicken noodle soup - 10 units  Snacks: candy, popcorn, cheddar crackers, fruit, yogurt  Beverages: Diet soda, Energy drinks, Water, Milk    Being Active:  Being Active Assessed  Today: No    Monitoring:  Monitoring Assessed Today: Yes  Did patient bring glucose meter to appointment? : Yes  Blood Glucose Meter: Accu-chek  Times checking blood sugar at home (number): 4  Times checking blood sugar at home (per): Day  Blood glucose trend: Fluctuating              Taking Medications:  Diabetes Medication(s)     Insulin       insulin aspart (NOVOLOG VIAL) 100 UNITS/ML vial    INJECT 8 TO 10 UNITS UNDER THE SKIN AT MEALTIME AS DIRECTED** TOTAL DAILY DOSE OF 35 UNITS     insulin glargine (LANTUS VIAL) 100 UNIT/ML vial    Inject 14 units subcutaneous daily as directed     insulin regular (HUMULIN R/NOVOLIN R VIAL) 100 UNIT/ML vial    Inject 8-10 units subcutaneous with meals as directed, total daily dose approx 35 units; Relion Regular insulin     Patient not taking: Reported on 5/12/2022          Taking Medication Assessed Today: Yes  Current Treatments: Insulin Injections  Dose schedule: Pre-breakfast, Pre-lunch, Pre-dinner, At bedtime  Given by: Patient  Problems taking diabetes medications regularly?: Yes  Diabetes medication side effects?: Yes    Problem Solving:  Problem Solving Assessed Today: No  Is the patient at risk for hypoglycemia?: Yes  Hypoglycemia Frequency: Weekly  Hypoglycemia Treatment: Candy  Is the patient at risk for DKA?: Yes    Hypoglycemia symptoms  Mood changes: Yes         Reducing Risks:  Reducing Risks Assessed Today: No  Diabetes Risks: Ethnicity  CAD Risks: Tobacco exposure, Diabetes Mellitus, Male sex    Healthy Coping:  Healthy Coping Assessed Today: Yes  Emotional response to diabetes: Ready to learn, Concern for health and well-being, Fear/Anxiety  Informal Support system:: Family, Significant other  Stage of change: PREPARATION (Decided to change - considering how)  Support resources: Other, Websites  Patient Activation Measure Survey Score:  No flowsheet data found.      Care Plan and Education Provided:  Care Plan: Diabetes   Updates made by Vickie Garcia RD  since 11/15/2022 12:00 AM      Problem: Diabetes Self-Management Education Needed to Optimize Self-Care Behaviors       Goal: Healthy Eating - follow a healthy eating pattern for diabetes       Task: Provide education on portion control and consistency in amount, composition and timing of food intake Completed 11/15/2022   Responsible User: Vickie Garcia RD      Goal: Monitoring - monitor glucose and ketones as directed       Task: Provide education on continuous glucose monitoring (sensor placement, use of eduin or /reader, understanding glucose trends, alerts and alarms, differences between sensor glucose and blood glucose) Completed 11/15/2022   Responsible User: Vickie Garcia RD      Goal: Taking Medication - patient is consistently taking medications as directed    Start Date: 10/4/2022   This Visit's Progress: 0%   Recent Progress: 0%   Note:    Take insulin aspart as ICR 1:15 + >150 mg/dL at the start of each meal.          Vickie Garcia RD, LD, [facility]     Time Spent: 45 minutes  Encounter Type: Individual    Any diabetes medication dose changes were made via the CDE Protocol per the patient's referring provider. A copy of this encounter was shared with the provider.

## 2023-01-02 ENCOUNTER — OFFICE VISIT (OUTPATIENT)
Dept: ENDOCRINOLOGY | Facility: CLINIC | Age: 38
End: 2023-01-02
Payer: COMMERCIAL

## 2023-01-02 VITALS
SYSTOLIC BLOOD PRESSURE: 122 MMHG | DIASTOLIC BLOOD PRESSURE: 75 MMHG | HEIGHT: 68 IN | WEIGHT: 176.7 LBS | HEART RATE: 93 BPM | BODY MASS INDEX: 26.78 KG/M2

## 2023-01-02 DIAGNOSIS — E10.9 TYPE 1 DIABETES MELLITUS WITHOUT COMPLICATION (H): Primary | ICD-10-CM

## 2023-01-02 PROCEDURE — 95251 CONT GLUC MNTR ANALYSIS I&R: CPT | Performed by: INTERNAL MEDICINE

## 2023-01-02 PROCEDURE — 99214 OFFICE O/P EST MOD 30 MIN: CPT | Performed by: INTERNAL MEDICINE
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
## 2023-01-12 DIAGNOSIS — E10.9 TYPE 1 DIABETES MELLITUS WITHOUT COMPLICATION (H): ICD-10-CM

## 2023-01-12 NOTE — TELEPHONE ENCOUNTER
Last Written Prescription Date:  1/11/2022  Last Fill Quantity: 300,  # refills: 3   Last office visit: 1/2/2023 with prescribing provider:     Future Office Visit:        Requested Prescriptions   Pending Prescriptions Disp Refills     blood glucose (NO BRAND SPECIFIED) test strip 300 strip 3     Sig: Use to test blood sugar 3 times daily or as directed.       There is no refill protocol information for this order

## 2023-01-13 NOTE — TELEPHONE ENCOUNTER
"Requested Prescriptions   Pending Prescriptions Disp Refills     blood glucose (NO BRAND SPECIFIED) test strip 300 strip 3     Sig: Use to test blood sugar 3 times daily or as directed.       Diabetic Supplies Protocol Passed - 1/12/2023  3:29 PM        Passed - Medication is active on med list        Passed - Patient is 18 years of age or older        Passed - Recent (6 mo) or future (30 days) visit within the authorizing provider's specialty     Patient had office visit in the last 6 months or has a visit in the next 30 days with authorizing provider.  See \"Patient Info\" tab in inbasket, or \"Choose Columns\" in Meds & Orders section of the refill encounter.               Next appt: 4/24/23  Refills sent  Yodit Ryan RN    "

## 2023-03-20 DIAGNOSIS — E10.9 TYPE 1 DIABETES MELLITUS WITHOUT COMPLICATION (H): ICD-10-CM

## 2023-03-20 NOTE — TELEPHONE ENCOUNTER
"Requested Prescriptions   Pending Prescriptions Disp Refills     insulin syringe-needle U-100 (BD INSULIN SYRINGE U/F) 30G X 1/2\" 0.3 ML miscellaneous 200 each 0     Sig: Use syringes daily or as directed.       There is no refill protocol information for this order        Last Written Prescription Date:  05/03/2022  Last Fill Quantity: 200 each,  # refills: 0   Last office visit: 1/2/2023 with prescribing provider:  Dr. Verdugo   Future Office Visit:  04/24/2023          "

## 2023-03-21 RX ORDER — PEN NEEDLE, DIABETIC 29 G X1/2"
NEEDLE, DISPOSABLE MISCELLANEOUS
Qty: 200 EACH | Refills: 0 | Status: SHIPPED | OUTPATIENT
Start: 2023-03-21 | End: 2023-05-12

## 2023-03-21 NOTE — TELEPHONE ENCOUNTER
"Requested Prescriptions   Pending Prescriptions Disp Refills     insulin syringe-needle U-100 (BD INSULIN SYRINGE U/F) 30G X 1/2\" 0.3 ML miscellaneous 200 each 0     Sig: Use 1 syringes four times daily or as directed.       Diabetic Supplies Protocol Passed - 3/20/2023  3:32 PM        Passed - Medication is active on med list        Passed - Patient is 18 years of age or older        Passed - Recent (6 mo) or future (30 days) visit within the authorizing provider's specialty     Patient had office visit in the last 6 months or has a visit in the next 30 days with authorizing provider.  See \"Patient Info\" tab in inbasket, or \"Choose Columns\" in Meds & Orders section of the refill encounter.               Refills sent  Yodit Ryan RN    "

## 2023-04-24 ENCOUNTER — OFFICE VISIT (OUTPATIENT)
Dept: ENDOCRINOLOGY | Facility: CLINIC | Age: 38
End: 2023-04-24
Payer: COMMERCIAL

## 2023-04-24 VITALS
SYSTOLIC BLOOD PRESSURE: 115 MMHG | HEART RATE: 103 BPM | WEIGHT: 187.7 LBS | DIASTOLIC BLOOD PRESSURE: 81 MMHG | BODY MASS INDEX: 28.45 KG/M2 | HEIGHT: 68 IN

## 2023-04-24 DIAGNOSIS — E10.9 TYPE 1 DIABETES MELLITUS WITHOUT COMPLICATION (H): Primary | ICD-10-CM

## 2023-04-24 PROCEDURE — 99214 OFFICE O/P EST MOD 30 MIN: CPT | Performed by: INTERNAL MEDICINE
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
## 2023-04-24 NOTE — PATIENT INSTRUCTIONS
Diabetes medication plan:  Novolog     Breakfast 10U   Lunch  8U   Supper  10U  Novolog sscale: Guestimates sscale  Lantus 14U   Subcutaneous bedtime    Restart use of the Freestyle Libre2 sensor   Download appt to the [employer] phone   Stop by pharmacy to pickup new Noam 2 sensors   Scan sensor before each meal and at bedtime  Reduce the Lantus insulin by 50% night before the arm surgery  Plan followup appt 6/8/23 at 12 noon with [place] at [facility] [address]   Wear Libre2 sensor at time of appointment    Endocrinology office appt 8/24/23 at 11:30 am with Dr. Verdugo
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
## 2023-05-12 DIAGNOSIS — E10.9 TYPE 1 DIABETES MELLITUS WITHOUT COMPLICATION (H): ICD-10-CM

## 2023-05-12 RX ORDER — PEN NEEDLE, DIABETIC 29 G X1/2"
NEEDLE, DISPOSABLE MISCELLANEOUS
Qty: 200 EACH | Refills: 1 | Status: SHIPPED | OUTPATIENT
Start: 2023-05-12 | End: 2023-10-02

## 2023-05-12 NOTE — TELEPHONE ENCOUNTER
"Requested Prescriptions   Pending Prescriptions Disp Refills     insulin syringe-needle U-100 (BD INSULIN SYRINGE U/F) 30G X 1/2\" 0.3 ML miscellaneous [Pharmacy Med Name: [facility] #[id number] SYR/NDL 0.3ML 30GX1/2 UF]       Sig: USE 1 SYRINGE FOUR TIMES DAILY OR AS DIRECTED       Diabetic Supplies Protocol Passed - 5/12/2023  7:25 AM        Passed - Medication is active on med list        Passed - Patient is 18 years of age or older        Passed - Recent (6 mo) or future (30 days) visit within the authorizing provider's specialty     Patient had office visit in the last 6 months or has a visit in the next 30 days with authorizing provider.  See \"Patient Info\" tab in inbasket, or \"Choose Columns\" in Meds & Orders section of the refill encounter.               Last Written Prescription Date:  3/21/23  Last Fill Quantity: 200 each,  # refills: 0   Last office visit: 4/24/2023 ; last virtual visit: 1/6/2022 with prescribing provider:  Dr Verdugo   Future Office Visit:  8/24/23    Sent refill.  Negrito Corey RN            "

## 2023-07-03 DIAGNOSIS — E10.9 TYPE 1 DIABETES MELLITUS WITHOUT COMPLICATION (H): ICD-10-CM

## 2023-07-03 NOTE — TELEPHONE ENCOUNTER
Requested Prescriptions   Pending Prescriptions Disp Refills     Continuous Blood Gluc Sensor (FREESTYLE BARBARA 2 SENSOR) MISC 2 each 11     Si Device continuous prn (Change every 14 days) For use with Freestyle Barbara 2  for continuous monitoring of blood glucose levels.  Change sensor every 14 days.       There is no refill protocol information for this order          Last Written Prescription Date: 22  Last Fill Quantity: 2,  # refills: 11   Last office visit: 2023 ; last virtual visit: 2022 with prescribing provider:  Dr. Verdugo   Future Office Visit:  23        Nima Posada MA

## 2023-08-21 DIAGNOSIS — E10.9 TYPE 1 DIABETES MELLITUS WITHOUT COMPLICATION (H): ICD-10-CM

## 2023-08-21 NOTE — TELEPHONE ENCOUNTER
Requested Prescriptions   Pending Prescriptions Disp Refills    Continuous Blood Gluc Sensor (FREESTYLE BARBARA 2 SENSOR) MISC 2 each 11     Si Device continuous prn (Change every 14 days) For use with Freestyle Barbara 2  for continuous monitoring of blood glucose levels.  Change sensor every 14 days.       There is no refill protocol information for this order        Next appt 23

## 2023-08-21 NOTE — TELEPHONE ENCOUNTER
Requested Prescriptions   Pending Prescriptions Disp Refills    Continuous Blood Gluc Sensor (FREESTYLE BARBARA 2 SENSOR) MISC 2 each 11     Si Device continuous prn (Change every 14 days) For use with Freestyle Barbara 2  for continuous monitoring of blood glucose levels.  Change sensor every 14 days.       There is no refill protocol information for this order       Last Written Prescription Date:  2022  Last Fill Quantity: 2 each,  # refills: 11   Last office visit: 2023 ; last virtual visit: 2022 with prescribing provider:  Dr. Óscar Verdugo   Future Office Visit:  2023      BENITA Oliveira

## 2023-09-28 DIAGNOSIS — E10.9 TYPE 1 DIABETES MELLITUS WITHOUT COMPLICATION (H): ICD-10-CM

## 2023-09-28 RX ORDER — INSULIN GLARGINE 100 [IU]/ML
INJECTION, SOLUTION SUBCUTANEOUS
Qty: 10 ML | Refills: 2 | Status: SHIPPED | OUTPATIENT
Start: 2023-09-28 | End: 2024-01-22

## 2023-09-28 NOTE — TELEPHONE ENCOUNTER
"Requested Prescriptions   Pending Prescriptions Disp Refills    insulin glargine (LANTUS VIAL) 100 UNIT/ML vial 10 mL 5     Sig: Inject 14 units subcutaneous daily as directed       Long Acting Insulin Protocol Failed - 9/28/2023 12:50 PM        Failed - Serum creatinine on file in past 12 months     Recent Labs   Lab Test 05/04/22  0832   CR 0.76       Ok to refill medication if creatinine is low          Failed - HgbA1C in past 3 or 6 months     If HgbA1C is 8 or greater, it needs to be on file within the past 3 months.  If less than 8, must be on file within the past 6 months.     Recent Labs   Lab Test 05/04/22  0832   A1C 8.3*             Passed - Medication is active on med list        Passed - Patient is age 18 or older        Passed - Recent (6 mo) or future (30 days) visit within the authorizing provider's specialty     Patient had office visit in the last 6 months or has a visit in the next 30 days with authorizing provider or within the authorizing provider's specialty.  See \"Patient Info\" tab in inbasket, or \"Choose Columns\" in Meds & Orders section of the refill encounter.               Refill sent  Negrito Corey RN    "

## 2023-09-28 NOTE — TELEPHONE ENCOUNTER
Requested Prescriptions   Pending Prescriptions Disp Refills    insulin glargine (LANTUS VIAL) 100 UNIT/ML vial 10 mL 5     Sig: Inject 14 units subcutaneous daily as directed       There is no refill protocol information for this order        Last Written Prescription Date:  9/7/2022  Last Fill Quantity: 10mL,  # refills: 5   Last office visit: 4/24/2023 ; last virtual visit: 1/6/2022 with prescribing provider:     Future Office Visit:  2/27/2024      Nima Posada MA

## 2023-10-02 ENCOUNTER — TELEPHONE (OUTPATIENT)
Dept: ENDOCRINOLOGY | Facility: CLINIC | Age: 38
End: 2023-10-02
Payer: COMMERCIAL

## 2023-10-02 DIAGNOSIS — E10.9 TYPE 1 DIABETES MELLITUS WITHOUT COMPLICATION (H): ICD-10-CM

## 2023-10-02 RX ORDER — INSULIN ASPART 100 [IU]/ML
INJECTION, SOLUTION INTRAVENOUS; SUBCUTANEOUS
Qty: 20 ML | Refills: 5 | Status: SHIPPED | OUTPATIENT
Start: 2023-10-02

## 2023-10-02 RX ORDER — PEN NEEDLE, DIABETIC 29 G X1/2"
NEEDLE, DISPOSABLE MISCELLANEOUS
Qty: 200 EACH | Refills: 1 | Status: SHIPPED | OUTPATIENT
Start: 2023-10-02

## 2023-10-02 NOTE — TELEPHONE ENCOUNTER
"Last Written Prescription Date:  9/7/22 and 5/12/23  Last Fill Quantity: 20/5 and 200/1  Last office visit: 4/24/2023 with Dr. Verdugo  Future Office Visit:  2/27/23      Requested Prescriptions   Pending Prescriptions Disp Refills    insulin aspart (NOVOLOG VIAL) 100 UNITS/ML vial 20 mL 5     Sig: INJECT 8 TO 10 UNITS UNDER THE SKIN AT MEALTIME AS DIRECTED** TOTAL DAILY DOSE OF 35 UNITS       There is no refill protocol information for this order       insulin syringe-needle U-100 (BD INSULIN SYRINGE U/F) 30G X 1/2\" 0.3 ML miscellaneous 200 each 1     Sig: USE 1 SYRINGE FOUR TIMES DAILY OR AS DIRECTED       There is no refill protocol information for this order      Lantus refill was sent last week.  RN called pharmacy, and they will get it ready.  Refills for Novolog and syringes sent.  Yodit Ryan RN    "

## 2023-10-02 NOTE — TELEPHONE ENCOUNTER
" [facility] Call Center    Phone Message    May a detailed message be left on voicemail: yes     Reason for Call: Medication Refill Request    Has the patient contacted the pharmacy for the refill? Yes     Name of medication being requested:   insulin glargine (LANTUS VIAL) 100 UNIT/ML vial   insulin syringe-needle U-100 (BD INSULIN SYRINGE U/F) 30G X 1/2\" 0.3 ML miscellaneous   insulin aspart (NOVOLOG VIAL) 100 UNITS/ML vial     Provider who prescribed the medication: Renée    Pharmacy:   [id number] - [place], [place] - [address]       Date medication is needed: 10/2/2023    Patient is running very low on medication and is out of Novolog       Action Taken: Message routed to:  [facility] Endo    Travel Screening: Not Applicable                                                                   "

## 2024-01-19 DIAGNOSIS — E10.9 TYPE 1 DIABETES MELLITUS WITHOUT COMPLICATION (H): ICD-10-CM

## 2024-01-22 RX ORDER — INSULIN GLARGINE 100 [IU]/ML
INJECTION, SOLUTION SUBCUTANEOUS
Qty: 10 ML | Refills: 0 | Status: SHIPPED | OUTPATIENT
Start: 2024-01-22 | End: 2024-04-09

## 2024-01-22 NOTE — TELEPHONE ENCOUNTER
"Requested Prescriptions   Pending Prescriptions Disp Refills    insulin glargine (LANTUS VIAL) 100 UNIT/ML vial [Pharmacy Med Name: LANTUS U-100 INSULIN 10ML] 10 mL 2     Sig: ADMINISTER 14 UNITS UNDER THE SKIN DAILY AS DIRECTED       Long Acting Insulin Protocol Failed - 1/19/2024  6:49 PM        Failed - Serum creatinine on file in past 12 months     Recent Labs   Lab Test 05/04/22  0832   CR 0.76       Ok to refill medication if creatinine is low          Failed - HgbA1C in past 3 or 6 months     If HgbA1C is 8 or greater, it needs to be on file within the past 3 months.  If less than 8, must be on file within the past 6 months.     Recent Labs   Lab Test 05/04/22  0832   A1C 8.3*             Failed - Recent (6 mo) or future (30 days) visit within the authorizing provider's specialty     Patient had office visit in the last 6 months or has a visit in the next 30 days with authorizing provider or within the authorizing provider's specialty.  See \"Patient Info\" tab in inbasket, or \"Choose Columns\" in Meds & Orders section of the refill encounter.            Passed - Medication is active on med list        Passed - Patient is age 18 or older       Insulin Protocol Failed - 1/19/2024  6:49 PM        Failed - Has GFR on file in past 12 months and most recent value is normal        Failed - Chart Review Required     Review Chart.    Do not approve if insulin is used in a pump.  Instead, direct refill request to the patient's endocrinologist.  If the patient doesn't have an endocrinologist, then send the refill to the patient's PCP for review            Passed - Medication is active on med list        Passed - Recent (6 mo) or future (90 days) visit within the authorizing provider's specialty     The patient must have completed an in-person or virtual visit within the past 6 months or has a future visit scheduled within the next 90 days with the authorizing provider s specialty.  Urgent care and e-visits do not quality " as an office visit for this protocol.          Passed - Medication indicated for associated diagnosis     Medication is associated with one or more of the following diagnoses:   - Type 1 diabetes mellitus  - Type 2 diabetes mellitus  - Diabetic nephropathy; Prophylaxis  - Neuropathy due to diabetes mellitus; Prophylaxis  - Retinopathy due to diabetes mellitus; Prophylaxis  - Diabetes mellitus associated with cystic fibrosis  - Disorder of cardiovascular system; Prophylaxis - Type 1 diabetes mellitus   - Disorder of cardiovascular system; Prophylaxis - Type 2 diabetes mellitus            Passed - Patient is 18 years of age or older           Last Written Prescription Date:  9/28/23  Last Fill Quantity: 10mL,  # refills: 2   Last office visit: 4/24/2023 ; last virtual visit: Visit date not found with prescribing provider:  Dr Verdugo   Future Office Visit:  2/27/24    [facility]ill sent to [facility]  Negrito Corey RN

## 2024-02-27 ENCOUNTER — OFFICE VISIT (OUTPATIENT)
Dept: ENDOCRINOLOGY | Facility: CLINIC | Age: 39
End: 2024-02-27

## 2024-02-27 VITALS
SYSTOLIC BLOOD PRESSURE: 117 MMHG | BODY MASS INDEX: 27.52 KG/M2 | HEART RATE: 74 BPM | WEIGHT: 181 LBS | DIASTOLIC BLOOD PRESSURE: 67 MMHG

## 2024-02-27 DIAGNOSIS — E10.9 TYPE 1 DIABETES MELLITUS WITHOUT COMPLICATION (H): Primary | ICD-10-CM

## 2024-02-27 PROCEDURE — 99213 OFFICE O/P EST LOW 20 MIN: CPT | Performed by: INTERNAL MEDICINE

## 2024-02-27 PROCEDURE — G2211 COMPLEX E/M VISIT ADD ON: HCPCS | Performed by: INTERNAL MEDICINE
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
## 2024-04-08 DIAGNOSIS — E10.9 TYPE 1 DIABETES MELLITUS WITHOUT COMPLICATION (H): ICD-10-CM

## 2024-04-09 RX ORDER — INSULIN GLARGINE 100 [IU]/ML
INJECTION, SOLUTION SUBCUTANEOUS
Qty: 10 ML | Refills: 3 | Status: SHIPPED | OUTPATIENT
Start: 2024-04-09

## 2024-06-27 ENCOUNTER — OFFICE VISIT (OUTPATIENT)
Dept: ENDOCRINOLOGY | Facility: CLINIC | Age: 39
End: 2024-06-27

## 2024-06-27 VITALS
HEART RATE: 83 BPM | OXYGEN SATURATION: 95 % | BODY MASS INDEX: 26.46 KG/M2 | WEIGHT: 174 LBS | DIASTOLIC BLOOD PRESSURE: 70 MMHG | SYSTOLIC BLOOD PRESSURE: 104 MMHG | RESPIRATION RATE: 18 BRPM

## 2024-06-27 DIAGNOSIS — E10.9 TYPE 1 DIABETES MELLITUS WITHOUT COMPLICATION (H): Primary | ICD-10-CM

## 2024-06-27 PROCEDURE — G2211 COMPLEX E/M VISIT ADD ON: HCPCS | Performed by: INTERNAL MEDICINE

## 2024-06-27 PROCEDURE — 99214 OFFICE O/P EST MOD 30 MIN: CPT | Performed by: INTERNAL MEDICINE

## 2024-06-27 NOTE — NURSING NOTE
Chief Complaint   Patient presents with    RECHECK       Type 1 diabetes mellitus without complication (H)           Vitals:    06/27/24 0830   BP: 104/70   BP Location: Left arm   Patient Position: Sitting   Cuff Size: Adult Regular   Pulse: 83   Resp: 18   SpO2: 95%   Weight: 78.9 kg (174 lb)       Body mass index is 26.46 kg/m .
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
## 2024-11-03 DIAGNOSIS — E10.9 TYPE 1 DIABETES MELLITUS WITHOUT COMPLICATION (H): ICD-10-CM

## 2024-11-04 RX ORDER — INSULIN GLARGINE 100 [IU]/ML
INJECTION, SOLUTION SUBCUTANEOUS
Qty: 10 ML | Refills: 0 | Status: SHIPPED | OUTPATIENT
Start: 2024-11-04

## 2024-11-04 NOTE — TELEPHONE ENCOUNTER
Last Written Prescription Date:  4/9/24  Last Fill Quantity: 10,  # refills: 3   Last office visit: 6/27/2024 ; last virtual visit: Visit date not found with prescribing provider:  Dr. Verdugo   Future Office Visit: 12/23/24     Requested Prescriptions   Pending Prescriptions Disp Refills    insulin glargine (LANTUS VIAL) 100 UNIT/ML vial [Pharmacy Med Name: LANTUS U-100 INSULIN 10ML] 10 mL 3     Sig: ADMINISTER 14 UNITS UNDER THE SKIN DAILY AS DIRECTED       Insulin Protocol Failed - 11/4/2024 12:05 PM        Failed - HgbA1C in past 3 or 6 months     If HgbA1C is 8 or greater, it needs to be on file within the past 3 months.  If less than 8, must be on file within the past 6 months.     Recent Labs   Lab Test 05/04/22  0832   A1C 8.3*             Failed - Has GFR on file in past 12 months and most recent value is normal        Failed - Chart review required     Review Chart.    Do not approve if insulin is used in a pump.  Instead, direct refill request to the patient's endocrinologist.  If the patient doesn't have an endocrinologist, then send the refill to the patient's PCP for review            Passed - Medication is active on med list        Passed - Recent (6 mo) or future (90 days) visit within the authorizing provider's specialty     The patient must have completed an in-person or virtual visit within the past 6 months or has a future visit scheduled within the next 90 days with the authorizing provider s specialty.  Urgent care and e-visits do not quality as an office visit for this protocol.          Passed - Medication indicated for associated diagnosis     Medication is associated with one or more of the following diagnoses:   - Type 1 diabetes mellitus  - Type 2 diabetes mellitus  - Diabetic nephropathy; Prophylaxis  - Neuropathy due to diabetes mellitus; Prophylaxis  - Retinopathy due to diabetes mellitus; Prophylaxis  - Diabetes mellitus associated with cystic fibrosis  - Disorder of cardiovascular  system; Prophylaxis - Type 1 diabetes mellitus   - Disorder of cardiovascular system; Prophylaxis - Type 2 diabetes mellitus            Passed - Patient is 18 years of age or older           Refills sent  Yodit Ryan RN

## 2024-11-08 DIAGNOSIS — E10.9 TYPE 1 DIABETES MELLITUS WITHOUT COMPLICATION (H): ICD-10-CM

## 2024-11-09 RX ORDER — INSULIN ASPART 100 [IU]/ML
INJECTION, SOLUTION INTRAVENOUS; SUBCUTANEOUS
Qty: 20 ML | Refills: 5 | Status: SHIPPED | OUTPATIENT
Start: 2024-11-09

## 2024-12-23 ENCOUNTER — OFFICE VISIT (OUTPATIENT)
Dept: ENDOCRINOLOGY | Facility: CLINIC | Age: 39
End: 2024-12-23

## 2024-12-23 VITALS
WEIGHT: 173.6 LBS | SYSTOLIC BLOOD PRESSURE: 107 MMHG | BODY MASS INDEX: 26.4 KG/M2 | HEART RATE: 79 BPM | DIASTOLIC BLOOD PRESSURE: 72 MMHG

## 2024-12-23 DIAGNOSIS — E10.9 TYPE 1 DIABETES MELLITUS WITHOUT COMPLICATION (H): ICD-10-CM

## 2024-12-23 DIAGNOSIS — E10.9 TYPE 1 DIABETES MELLITUS WITHOUT COMPLICATION (H): Primary | ICD-10-CM

## 2024-12-23 PROCEDURE — 99214 OFFICE O/P EST MOD 30 MIN: CPT | Performed by: INTERNAL MEDICINE

## 2024-12-23 PROCEDURE — G2211 COMPLEX E/M VISIT ADD ON: HCPCS | Performed by: INTERNAL MEDICINE

## 2024-12-23 RX ORDER — INSULIN GLARGINE 100 [IU]/ML
INJECTION, SOLUTION SUBCUTANEOUS
Qty: 10 ML | Refills: 5 | Status: SHIPPED | OUTPATIENT
Start: 2024-12-23

## 2024-12-23 NOTE — NURSING NOTE
Chief Complaint   Patient presents with    RECHECK     Type 1 diabetes mellitus without complication       Vitals:    12/23/24 1206   BP: 107/72   BP Location: Left arm   Patient Position: Sitting   Cuff Size: Adult Regular   Pulse: 79   Weight: 78.7 kg (173 lb 9.6 oz)       Body mass index is 26.4 kg/m .      Macario Thompson MA
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
## 2025-02-18 ENCOUNTER — OFFICE VISIT (OUTPATIENT)
Dept: ENDOCRINOLOGY | Facility: CLINIC | Age: 40
End: 2025-02-18

## 2025-02-18 VITALS
WEIGHT: 169 LBS | SYSTOLIC BLOOD PRESSURE: 127 MMHG | DIASTOLIC BLOOD PRESSURE: 81 MMHG | HEART RATE: 101 BPM | BODY MASS INDEX: 25.7 KG/M2

## 2025-02-18 DIAGNOSIS — E10.9 TYPE 1 DIABETES MELLITUS WITHOUT COMPLICATION (H): Primary | ICD-10-CM

## 2025-02-18 PROCEDURE — G2211 COMPLEX E/M VISIT ADD ON: HCPCS | Performed by: INTERNAL MEDICINE

## 2025-02-18 PROCEDURE — 99214 OFFICE O/P EST MOD 30 MIN: CPT | Performed by: INTERNAL MEDICINE

## 2025-02-18 RX ORDER — HYDROCHLOROTHIAZIDE 12.5 MG/1
CAPSULE ORAL
Qty: 6 EACH | Refills: 1 | Status: SHIPPED | OUTPATIENT
Start: 2025-02-18

## 2025-02-18 NOTE — PATIENT INSTRUCTIONS
Diabetes medications:  Novolin Reg    Breakfast 10U   Lunch  8-10U   Supper  10U  Novolog sscale:    mg/dl.............No correction  151-200 mg/dl............+1 units  201-250 mg/dl............+2 units  251-300 mg/dl............+3 units  301-350 mg/dl............+4 units  350-400 mg/dl............+5 units  401-450 mg/dl............+6 units and call physician     Cristiant 15U   Subcutaneous bedtime    Start use of Freestyle Noam 3Plus sensor, lasts 15-days   Download [employer] smartphone eduin   New Noam 3Plus Rx sent to [facility]   Watch YouTube video on Noam 3 sensor use  Arrange non-fasting labs at [facility] clinic soon  Plan to see one of the [facility] diabetes educators   Call [phone] to schedule  Schedule eye exam in [place], report faxed to our office

## 2025-04-07 ENCOUNTER — TRANSFERRED RECORDS (OUTPATIENT)
Dept: HEALTH INFORMATION MANAGEMENT | Facility: CLINIC | Age: 40
End: 2025-04-07
Payer: COMMERCIAL

## 2025-04-07 LAB — RETINOPATHY: NEGATIVE

## 2025-05-24 ENCOUNTER — HEALTH MAINTENANCE LETTER (OUTPATIENT)
Age: 40
End: 2025-05-24

## 2025-06-17 ENCOUNTER — MYC MEDICAL ADVICE (OUTPATIENT)
Dept: ENDOCRINOLOGY | Facility: CLINIC | Age: 40
End: 2025-06-17
Payer: COMMERCIAL

## 2025-06-23 ENCOUNTER — PATIENT OUTREACH (OUTPATIENT)
Dept: CARE COORDINATION | Facility: CLINIC | Age: 40
End: 2025-06-23
Payer: COMMERCIAL

## 2025-06-25 ENCOUNTER — PATIENT OUTREACH (OUTPATIENT)
Dept: CARE COORDINATION | Facility: CLINIC | Age: 40
End: 2025-06-25
Payer: COMMERCIAL